# Patient Record
Sex: MALE | Race: BLACK OR AFRICAN AMERICAN | NOT HISPANIC OR LATINO | URBAN - METROPOLITAN AREA
[De-identification: names, ages, dates, MRNs, and addresses within clinical notes are randomized per-mention and may not be internally consistent; named-entity substitution may affect disease eponyms.]

---

## 2018-04-03 ENCOUNTER — INPATIENT (INPATIENT)
Facility: HOSPITAL | Age: 68
LOS: 9 days | Discharge: NURSING, REHAB OR LONG-TERM CARE | DRG: 690 | End: 2018-04-13
Attending: HOSPITALIST | Admitting: HOSPITALIST
Payer: MEDICARE

## 2018-04-03 VITALS
WEIGHT: 240.08 LBS | RESPIRATION RATE: 18 BRPM | HEIGHT: 71 IN | DIASTOLIC BLOOD PRESSURE: 79 MMHG | SYSTOLIC BLOOD PRESSURE: 157 MMHG | HEART RATE: 62 BPM | TEMPERATURE: 98 F | OXYGEN SATURATION: 96 %

## 2018-04-03 DIAGNOSIS — N18.4 CHRONIC KIDNEY DISEASE, STAGE 4 (SEVERE): ICD-10-CM

## 2018-04-03 DIAGNOSIS — N18.6 END STAGE RENAL DISEASE: ICD-10-CM

## 2018-04-03 DIAGNOSIS — N19 UNSPECIFIED KIDNEY FAILURE: ICD-10-CM

## 2018-04-03 DIAGNOSIS — N13.9 OBSTRUCTIVE AND REFLUX UROPATHY, UNSPECIFIED: ICD-10-CM

## 2018-04-03 LAB
ABO RH CONFIRMATION: SIGNIFICANT CHANGE UP
ALBUMIN SERPL ELPH-MCNC: 4.2 G/DL — SIGNIFICANT CHANGE UP (ref 3.3–5.2)
ALP SERPL-CCNC: 117 U/L — SIGNIFICANT CHANGE UP (ref 40–120)
ALT FLD-CCNC: 8 U/L — SIGNIFICANT CHANGE UP
ANION GAP SERPL CALC-SCNC: 16 MMOL/L — SIGNIFICANT CHANGE UP (ref 5–17)
ANISOCYTOSIS BLD QL: SLIGHT — SIGNIFICANT CHANGE UP
APPEARANCE UR: CLEAR — SIGNIFICANT CHANGE UP
APTT BLD: 27.6 SEC — SIGNIFICANT CHANGE UP (ref 27.5–37.4)
AST SERPL-CCNC: 25 U/L — SIGNIFICANT CHANGE UP
BACTERIA # UR AUTO: ABNORMAL
BASOPHILS # BLD AUTO: 0 K/UL — SIGNIFICANT CHANGE UP (ref 0–0.2)
BASOPHILS NFR BLD AUTO: 0.3 % — SIGNIFICANT CHANGE UP (ref 0–2)
BILIRUB SERPL-MCNC: 0.5 MG/DL — SIGNIFICANT CHANGE UP (ref 0.4–2)
BILIRUB UR-MCNC: NEGATIVE — SIGNIFICANT CHANGE UP
BLD GP AB SCN SERPL QL: SIGNIFICANT CHANGE UP
BUN SERPL-MCNC: 47 MG/DL — HIGH (ref 8–20)
CALCIUM SERPL-MCNC: 8.9 MG/DL — SIGNIFICANT CHANGE UP (ref 8.6–10.2)
CHLORIDE SERPL-SCNC: 102 MMOL/L — SIGNIFICANT CHANGE UP (ref 98–107)
CHLORIDE UR-SCNC: 31 MMOL/L — SIGNIFICANT CHANGE UP
CO2 SERPL-SCNC: 18 MMOL/L — LOW (ref 22–29)
COLOR SPEC: YELLOW — SIGNIFICANT CHANGE UP
CREAT ?TM UR-MCNC: 64 MG/DL — SIGNIFICANT CHANGE UP
CREAT SERPL-MCNC: 2.55 MG/DL — HIGH (ref 0.5–1.3)
DACRYOCYTES BLD QL SMEAR: SLIGHT — SIGNIFICANT CHANGE UP
DIFF PNL FLD: ABNORMAL
ELLIPTOCYTES BLD QL SMEAR: SLIGHT — SIGNIFICANT CHANGE UP
EOSINOPHIL # BLD AUTO: 0 K/UL — SIGNIFICANT CHANGE UP (ref 0–0.5)
EOSINOPHIL NFR BLD AUTO: 0.6 % — SIGNIFICANT CHANGE UP (ref 0–5)
EPI CELLS # UR: NEGATIVE — SIGNIFICANT CHANGE UP
FERRITIN SERPL-MCNC: 17 NG/ML — LOW (ref 30–400)
FOLATE SERPL-MCNC: 6.6 NG/ML — SIGNIFICANT CHANGE UP (ref 4–16)
GLUCOSE BLDC GLUCOMTR-MCNC: 126 MG/DL — HIGH (ref 70–99)
GLUCOSE BLDC GLUCOMTR-MCNC: 186 MG/DL — HIGH (ref 70–99)
GLUCOSE BLDC GLUCOMTR-MCNC: 200 MG/DL — HIGH (ref 70–99)
GLUCOSE BLDC GLUCOMTR-MCNC: 236 MG/DL — HIGH (ref 70–99)
GLUCOSE SERPL-MCNC: 152 MG/DL — HIGH (ref 70–115)
GLUCOSE UR QL: NEGATIVE MG/DL — SIGNIFICANT CHANGE UP
HCT VFR BLD CALC: 21.7 % — LOW (ref 42–52)
HCT VFR BLD CALC: 23.4 % — LOW (ref 42–52)
HCT VFR BLD CALC: 25.5 % — LOW (ref 42–52)
HGB BLD-MCNC: 6.5 G/DL — CRITICAL LOW (ref 14–18)
HGB BLD-MCNC: 7.1 G/DL — LOW (ref 14–18)
HGB BLD-MCNC: 7.7 G/DL — LOW (ref 14–18)
HYPOCHROMIA BLD QL: SLIGHT — SIGNIFICANT CHANGE UP
INR BLD: 1.22 RATIO — HIGH (ref 0.88–1.16)
IRON SATN MFR SERPL: 23 UG/DL — LOW (ref 59–158)
IRON SATN MFR SERPL: 5 % — LOW (ref 16–55)
KETONES UR-MCNC: NEGATIVE — SIGNIFICANT CHANGE UP
LEUKOCYTE ESTERASE UR-ACNC: ABNORMAL
LIDOCAIN IGE QN: 39 U/L — SIGNIFICANT CHANGE UP (ref 22–51)
LYMPHOCYTES # BLD AUTO: 1.1 K/UL — SIGNIFICANT CHANGE UP (ref 1–4.8)
LYMPHOCYTES # BLD AUTO: 13 % — LOW (ref 20–55)
MACROCYTES BLD QL: SLIGHT — SIGNIFICANT CHANGE UP
MCHC RBC-ENTMCNC: 19.4 PG — LOW (ref 27–31)
MCHC RBC-ENTMCNC: 19.6 PG — LOW (ref 27–31)
MCHC RBC-ENTMCNC: 19.9 PG — LOW (ref 27–31)
MCHC RBC-ENTMCNC: 30 G/DL — LOW (ref 32–36)
MCHC RBC-ENTMCNC: 30.2 G/DL — LOW (ref 32–36)
MCHC RBC-ENTMCNC: 30.3 G/DL — LOW (ref 32–36)
MCV RBC AUTO: 64.8 FL — LOW (ref 80–94)
MCV RBC AUTO: 64.9 FL — LOW (ref 80–94)
MCV RBC AUTO: 65.7 FL — LOW (ref 80–94)
MICROCYTES BLD QL: SLIGHT — SIGNIFICANT CHANGE UP
MONOCYTES # BLD AUTO: 0.9 K/UL — HIGH (ref 0–0.8)
MONOCYTES NFR BLD AUTO: 10.1 % — HIGH (ref 3–10)
NEUTROPHILS # BLD AUTO: 6.5 K/UL — SIGNIFICANT CHANGE UP (ref 1.8–8)
NEUTROPHILS NFR BLD AUTO: 75.8 % — HIGH (ref 37–73)
NITRITE UR-MCNC: NEGATIVE — SIGNIFICANT CHANGE UP
OB PNL STL: NEGATIVE — SIGNIFICANT CHANGE UP
OSMOLALITY UR: 297 MOSM/KG — LOW (ref 300–1000)
OVALOCYTES BLD QL SMEAR: SLIGHT — SIGNIFICANT CHANGE UP
PH UR: 6 — SIGNIFICANT CHANGE UP (ref 5–8)
PLAT MORPH BLD: NORMAL — SIGNIFICANT CHANGE UP
PLATELET # BLD AUTO: 303 K/UL — SIGNIFICANT CHANGE UP (ref 150–400)
PLATELET # BLD AUTO: 307 K/UL — SIGNIFICANT CHANGE UP (ref 150–400)
PLATELET # BLD AUTO: 315 K/UL — SIGNIFICANT CHANGE UP (ref 150–400)
POIKILOCYTOSIS BLD QL AUTO: SLIGHT — SIGNIFICANT CHANGE UP
POTASSIUM SERPL-MCNC: 4.7 MMOL/L — SIGNIFICANT CHANGE UP (ref 3.5–5.3)
POTASSIUM SERPL-SCNC: 4.7 MMOL/L — SIGNIFICANT CHANGE UP (ref 3.5–5.3)
PROT ?TM UR-MCNC: 51 MG/DL — HIGH (ref 0–12)
PROT SERPL-MCNC: 8.6 G/DL — SIGNIFICANT CHANGE UP (ref 6.6–8.7)
PROT UR-MCNC: 30 MG/DL
PROT/CREAT UR-RTO: 0.8 RATIO — HIGH
PROTHROM AB SERPL-ACNC: 13.5 SEC — HIGH (ref 9.8–12.7)
RBC # BLD: 3.35 M/UL — LOW (ref 4.6–6.2)
RBC # BLD: 3.56 M/UL — LOW (ref 4.6–6.2)
RBC # BLD: 3.93 M/UL — LOW (ref 4.6–6.2)
RBC # FLD: 18.3 % — HIGH (ref 11–15.6)
RBC # FLD: 18.3 % — HIGH (ref 11–15.6)
RBC # FLD: 18.4 % — HIGH (ref 11–15.6)
RBC BLD AUTO: ABNORMAL
RBC CASTS # UR COMP ASSIST: SIGNIFICANT CHANGE UP /HPF (ref 0–4)
SODIUM SERPL-SCNC: 136 MMOL/L — SIGNIFICANT CHANGE UP (ref 135–145)
SODIUM UR-SCNC: 45 MMOL/L — SIGNIFICANT CHANGE UP
SP GR SPEC: 1.01 — SIGNIFICANT CHANGE UP (ref 1.01–1.02)
TIBC SERPL-MCNC: 466 UG/DL — HIGH (ref 220–430)
TRANSFERRIN SERPL-MCNC: 326 MG/DL — SIGNIFICANT CHANGE UP (ref 180–329)
TYPE + AB SCN PNL BLD: SIGNIFICANT CHANGE UP
UROBILINOGEN FLD QL: NEGATIVE MG/DL — SIGNIFICANT CHANGE UP
VIT B12 SERPL-MCNC: 939 PG/ML — SIGNIFICANT CHANGE UP (ref 232–1245)
WBC # BLD: 7.5 K/UL — SIGNIFICANT CHANGE UP (ref 4.8–10.8)
WBC # BLD: 8.5 K/UL — SIGNIFICANT CHANGE UP (ref 4.8–10.8)
WBC # BLD: 8.6 K/UL — SIGNIFICANT CHANGE UP (ref 4.8–10.8)
WBC # FLD AUTO: 7.5 K/UL — SIGNIFICANT CHANGE UP (ref 4.8–10.8)
WBC # FLD AUTO: 8.5 K/UL — SIGNIFICANT CHANGE UP (ref 4.8–10.8)
WBC # FLD AUTO: 8.6 K/UL — SIGNIFICANT CHANGE UP (ref 4.8–10.8)
WBC UR QL: >50

## 2018-04-03 PROCEDURE — 99285 EMERGENCY DEPT VISIT HI MDM: CPT

## 2018-04-03 PROCEDURE — 93010 ELECTROCARDIOGRAM REPORT: CPT

## 2018-04-03 PROCEDURE — 76775 US EXAM ABDO BACK WALL LIM: CPT | Mod: 26

## 2018-04-03 PROCEDURE — 99223 1ST HOSP IP/OBS HIGH 75: CPT

## 2018-04-03 PROCEDURE — 71045 X-RAY EXAM CHEST 1 VIEW: CPT | Mod: 26

## 2018-04-03 PROCEDURE — 74176 CT ABD & PELVIS W/O CONTRAST: CPT | Mod: 26

## 2018-04-03 RX ORDER — CEFTRIAXONE 500 MG/1
1000 INJECTION, POWDER, FOR SOLUTION INTRAMUSCULAR; INTRAVENOUS ONCE
Qty: 0 | Refills: 0 | Status: COMPLETED | OUTPATIENT
Start: 2018-04-03 | End: 2018-04-03

## 2018-04-03 RX ORDER — SODIUM CHLORIDE 9 MG/ML
1000 INJECTION, SOLUTION INTRAVENOUS
Qty: 0 | Refills: 0 | Status: DISCONTINUED | OUTPATIENT
Start: 2018-04-03 | End: 2018-04-13

## 2018-04-03 RX ORDER — DEXTROSE 50 % IN WATER 50 %
25 SYRINGE (ML) INTRAVENOUS ONCE
Qty: 0 | Refills: 0 | Status: DISCONTINUED | OUTPATIENT
Start: 2018-04-03 | End: 2018-04-13

## 2018-04-03 RX ORDER — INSULIN LISPRO 100/ML
VIAL (ML) SUBCUTANEOUS
Qty: 0 | Refills: 0 | Status: DISCONTINUED | OUTPATIENT
Start: 2018-04-03 | End: 2018-04-13

## 2018-04-03 RX ORDER — ACETAMINOPHEN 500 MG
650 TABLET ORAL EVERY 6 HOURS
Qty: 0 | Refills: 0 | Status: DISCONTINUED | OUTPATIENT
Start: 2018-04-03 | End: 2018-04-13

## 2018-04-03 RX ORDER — CEFTRIAXONE 500 MG/1
1 INJECTION, POWDER, FOR SOLUTION INTRAMUSCULAR; INTRAVENOUS ONCE
Qty: 0 | Refills: 0 | Status: DISCONTINUED | OUTPATIENT
Start: 2018-04-03 | End: 2018-04-03

## 2018-04-03 RX ORDER — DEXTROSE 50 % IN WATER 50 %
1 SYRINGE (ML) INTRAVENOUS ONCE
Qty: 0 | Refills: 0 | Status: DISCONTINUED | OUTPATIENT
Start: 2018-04-03 | End: 2018-04-13

## 2018-04-03 RX ORDER — DEXTROSE 50 % IN WATER 50 %
12.5 SYRINGE (ML) INTRAVENOUS ONCE
Qty: 0 | Refills: 0 | Status: DISCONTINUED | OUTPATIENT
Start: 2018-04-03 | End: 2018-04-13

## 2018-04-03 RX ORDER — SODIUM CHLORIDE 9 MG/ML
1000 INJECTION, SOLUTION INTRAVENOUS ONCE
Qty: 0 | Refills: 0 | Status: COMPLETED | OUTPATIENT
Start: 2018-04-03 | End: 2018-04-03

## 2018-04-03 RX ORDER — SACCHAROMYCES BOULARDII 250 MG
250 POWDER IN PACKET (EA) ORAL
Qty: 0 | Refills: 0 | Status: DISCONTINUED | OUTPATIENT
Start: 2018-04-03 | End: 2018-04-05

## 2018-04-03 RX ORDER — CEFTRIAXONE 500 MG/1
1 INJECTION, POWDER, FOR SOLUTION INTRAMUSCULAR; INTRAVENOUS EVERY 24 HOURS
Qty: 0 | Refills: 0 | Status: DISCONTINUED | OUTPATIENT
Start: 2018-04-04 | End: 2018-04-05

## 2018-04-03 RX ORDER — OXYCODONE HYDROCHLORIDE 5 MG/1
5 TABLET ORAL ONCE
Qty: 0 | Refills: 0 | Status: DISCONTINUED | OUTPATIENT
Start: 2018-04-03 | End: 2018-04-03

## 2018-04-03 RX ORDER — LIDOCAINE HCL 20 MG/ML
20 VIAL (ML) INJECTION ONCE
Qty: 0 | Refills: 0 | Status: COMPLETED | OUTPATIENT
Start: 2018-04-03 | End: 2018-04-03

## 2018-04-03 RX ORDER — TAMSULOSIN HYDROCHLORIDE 0.4 MG/1
0.4 CAPSULE ORAL AT BEDTIME
Qty: 0 | Refills: 0 | Status: DISCONTINUED | OUTPATIENT
Start: 2018-04-03 | End: 2018-04-04

## 2018-04-03 RX ORDER — IRON SUCROSE 20 MG/ML
200 INJECTION, SOLUTION INTRAVENOUS EVERY 24 HOURS
Qty: 0 | Refills: 0 | Status: COMPLETED | OUTPATIENT
Start: 2018-04-03 | End: 2018-04-07

## 2018-04-03 RX ORDER — GLUCAGON INJECTION, SOLUTION 0.5 MG/.1ML
1 INJECTION, SOLUTION SUBCUTANEOUS ONCE
Qty: 0 | Refills: 0 | Status: DISCONTINUED | OUTPATIENT
Start: 2018-04-03 | End: 2018-04-13

## 2018-04-03 RX ORDER — SODIUM CHLORIDE 9 MG/ML
1000 INJECTION INTRAMUSCULAR; INTRAVENOUS; SUBCUTANEOUS
Qty: 0 | Refills: 0 | Status: DISCONTINUED | OUTPATIENT
Start: 2018-04-03 | End: 2018-04-05

## 2018-04-03 RX ORDER — ONDANSETRON 8 MG/1
4 TABLET, FILM COATED ORAL EVERY 8 HOURS
Qty: 0 | Refills: 0 | Status: COMPLETED | OUTPATIENT
Start: 2018-04-03 | End: 2018-04-06

## 2018-04-03 RX ADMIN — CEFTRIAXONE 1000 MILLIGRAM(S): 500 INJECTION, POWDER, FOR SOLUTION INTRAMUSCULAR; INTRAVENOUS at 06:19

## 2018-04-03 RX ADMIN — IRON SUCROSE 110 MILLIGRAM(S): 20 INJECTION, SOLUTION INTRAVENOUS at 14:47

## 2018-04-03 RX ADMIN — SODIUM CHLORIDE 100 MILLILITER(S): 9 INJECTION INTRAMUSCULAR; INTRAVENOUS; SUBCUTANEOUS at 21:24

## 2018-04-03 RX ADMIN — Medication 1: at 17:19

## 2018-04-03 RX ADMIN — OXYCODONE HYDROCHLORIDE 5 MILLIGRAM(S): 5 TABLET ORAL at 21:55

## 2018-04-03 RX ADMIN — SODIUM CHLORIDE 100 MILLILITER(S): 9 INJECTION INTRAMUSCULAR; INTRAVENOUS; SUBCUTANEOUS at 13:08

## 2018-04-03 RX ADMIN — SODIUM CHLORIDE 1000 MILLILITER(S): 9 INJECTION, SOLUTION INTRAVENOUS at 06:19

## 2018-04-03 RX ADMIN — Medication: at 15:41

## 2018-04-03 RX ADMIN — ONDANSETRON 4 MILLIGRAM(S): 8 TABLET, FILM COATED ORAL at 13:08

## 2018-04-03 RX ADMIN — Medication 650 MILLIGRAM(S): at 17:20

## 2018-04-03 RX ADMIN — ONDANSETRON 4 MILLIGRAM(S): 8 TABLET, FILM COATED ORAL at 21:24

## 2018-04-03 RX ADMIN — OXYCODONE HYDROCHLORIDE 5 MILLIGRAM(S): 5 TABLET ORAL at 21:24

## 2018-04-03 RX ADMIN — TAMSULOSIN HYDROCHLORIDE 0.4 MILLIGRAM(S): 0.4 CAPSULE ORAL at 21:24

## 2018-04-03 RX ADMIN — Medication 250 MILLIGRAM(S): at 21:24

## 2018-04-03 NOTE — ED PROVIDER NOTE - CARE PLAN
Principal Discharge DX:	Renal failure  Secondary Diagnosis:	Urinary obstruction  Secondary Diagnosis:	Anemia

## 2018-04-03 NOTE — ED ADULT NURSE REASSESSMENT NOTE - NS ED NURSE REASSESS COMMENT FT1
pt ex wife arrived at the Emergency Department at this time. as per ex-wife pt has been weak this past week and vomiting. pt denies any symptoms at this time. pt is awaiting for a doctor evaluation.
pt is resting at this time, VSS md ordered a bourne catheter to be placed, pt refused regardless of medical need.  Md spring aware, pt is to be admitted. will continue to monitor
MD alejandro Patel at bedside assessing patient, s/p  bounre insertion.  MD made aware of hematuria, Pts refusal of blood transfusion.  Md believe this is chronic anemia, vitals WNL, and receiving Venofer.  Pt to be placed on tele  once he arrives to his  admitting floor.
Pt axox3 brought to [address] [place] [address]. Bedside report given to RN sofi . Pt vitals WNL. Pt placed tele monitor by receiving RN, fall precautions in place, staff made aware of needs.

## 2018-04-03 NOTE — CONSULT NOTE ADULT - SUBJECTIVE AND OBJECTIVE BOX
UROLOGY CONSULT NOTE    HPI:  History limited as Pt is poor historian, obtained form patient son at bedside. Briefly he is 68 y/o male with PMHx of DM-II, legally blind, reported no other PMHx and not on any medicine at home though has not seen any PMD in years, presented to ED with vomiting, X 1 episode. Per son patient had NBNB vomiting episode at home yesterday and hence he was brought into ED. Pt is AOX3, he denied any active complaints, no fever, chills, nausea, headache, dizziness, chest pain, SOB, palpitation, bowel .bladder habits are normal, able to urinate. (2018 09:40)      Pt seen and examined at the bedside.  He is not complaining of any abdominal pain at this time but is complaining of some pain at the base of the penis to the tip of the penis.  Pt states he does not follow with a PCP and has never had his prostate checked.  He admits to frequent urination at night, difficulty starting to urinate, feeling of needing to urinate again shortly after voiding.  Pt has bourne already inserted draining bright red urine with visible clots.      PAST MEDICAL & SURGICAL HISTORY:  Diabetes  No significant past surgical history      REVIEW OF SYSTEMS  Negative as per HPI	    MEDICATIONS  (STANDING):  dextrose 5%. 1000 milliLiter(s) (50 mL/Hr) IV Continuous <Continuous>  dextrose 50% Injectable 12.5 Gram(s) IV Push once  dextrose 50% Injectable 25 Gram(s) IV Push once  dextrose 50% Injectable 25 Gram(s) IV Push once  insulin lispro (HumaLOG) corrective regimen sliding scale   SubCutaneous three times a day before meals  iron sucrose IVPB 200 milliGRAM(s) IV Intermittent every 24 hours  ondansetron Injectable 4 milliGRAM(s) IV Push every 8 hours  oxyCODONE    IR 5 milliGRAM(s) Oral once  saccharomyces boulardii 250 milliGRAM(s) Oral two times a day  sodium chloride 0.9%. 1000 milliLiter(s) (100 mL/Hr) IV Continuous <Continuous>  tamsulosin 0.4 milliGRAM(s) Oral at bedtime    MEDICATIONS  (PRN):  acetaminophen   Tablet 650 milliGRAM(s) Oral every 6 hours PRN For Temp greater than 38 C (100.4 F)  acetaminophen   Tablet. 650 milliGRAM(s) Oral every 6 hours PRN Mild Pain (1 - 3)  dextrose Gel 1 Dose(s) Oral once PRN Blood Glucose LESS THAN 70 milliGRAM(s)/deciliter  glucagon  Injectable 1 milliGRAM(s) IntraMuscular once PRN Glucose LESS THAN 70 milligrams/deciliter      Allergies    amoxicillin (Rash)    Intolerances        SOCIAL HISTORY:    FAMILY HISTORY:      Vital Signs Last 24 Hrs  T(C): 37.1 (2018 16:17), Max: 37.1 (2018 16:17)  T(F): 98.7 (2018 16:17), Max: 98.7 (2018 16:17)  HR: 85 (2018 16:17) (62 - 87)  BP: 143/77 (2018 16:17) (137/66 - 166/84)  BP(mean): --  RR: 18 (2018 16:17) (18 - 20)  SpO2: 92% (2018 16:17) (92% - 100%)    PHYSICAL EXAM:    Constitutional: NAD, resting comfortably, WNWD  Head: NCAT  Neck: trachea midline, FROM  Respiratory: CTA b/l, no WRR  Cardiovascular: S1S2, RRR  Gastrointestinal: abd. soft, NT/ND, +BSx4   Genitourinary: +bourne c bright red urine draining, visible clots in bourne tubing, prostate mildly tender, bourne balloon feels to be inflated in the base of the prostate, no visible blood in the rectum  Extremities: no LE edema b/l  Neurological: AOx3, sensation grossly intact  Skin: warm, dry, intact       LABS:                        7.1    8.5   )-----------( 307      ( 2018 20:45 )             23.4     04-    136  |  102  |  47.0<H>  ----------------------------<  152<H>  4.7   |  18.0<L>  |  2.55<H>    Ca    8.9      2018 04:44    TPro  8.6  /  Alb  4.2  /  TBili  0.5  /  DBili  x   /  AST  25  /  ALT  8   /  AlkPhos  117  04-03    PT/INR - ( 2018 11:56 )   PT: 13.5 sec;   INR: 1.22 ratio         PTT - ( 2018 11:56 )  PTT:27.6 sec  Urinalysis Basic - ( 2018 04:22 )    Color: Yellow / Appearance: Clear / S.010 / pH: x  Gluc: x / Ketone: Negative  / Bili: Negative / Urobili: Negative mg/dL   Blood: x / Protein: 30 mg/dL / Nitrite: Negative   Leuk Esterase: Moderate / RBC: 0-2 /HPF / WBC >50   Sq Epi: x / Non Sq Epi: Negative / Bacteria: Occasional        RADIOLOGY & ADDITIONAL STUDIES:  EXAM: CT ABDOMEN AND PELVIS     PROCEDURE DATE: 2018     INTERPRETATION: HISTORY: Hydronephrosis and acute renal failure..     DATE and TIME of EXAM: 4/3/2018 8:43 AM     TECHNIQUE: Sections were obtained from the diaphragm to the pubic symphysis   without oral or IV contrast.     COMPARISON EXAMINATION: No prior exam.     FINDINGS:   The lung bases are clear and there is no evidence of a pleural effusion. The   liver is unremarkable. Status post cholecystectomy.     The spleen is not enlarged and demonstrates no focal abnormality. The   pancreatic contour is unremarkable without evidence of mass, inflammation or   ductal dilatation.     The adrenal glands demonstrate normal size and contour.     The left kidney demonstrates a small hyperdense cyst. A simple cyst arises   from the lower pole as well. There is bilateral hydronephrosis and   hydroureter area due to both ureters are dilated to the level of the   ureterovesical junction. The prostate gland is enlarged. The bladder is   distended. There is diffuse thickening of the bladder wall. The bladder   extends into the lower abdomen. Trabeculation of the bladder wall is noted   consistent with chronic outlet obstruction.     No evidence of retroperitoneal or pelvic lymphadenopathy.     No colonic abnormality. The appendix is visualized and is normal.     Degenerative changes in the spine.     IMPRESSION:     Bilateral hydronephrosis and hydroureter to the level of the urinary   bladder. Enlargement of the prostate gland is noted with distention of the   urinary bladder, thickening of the bladder wall, and trabeculation of the   bladder wall. Findings consistent with chronic outlet obstruction..

## 2018-04-03 NOTE — ED PROVIDER NOTE - ATTENDING CONTRIBUTION TO CARE
66 yo M with hx of DM c/o vomiting.  No fever or diarrhea.  Pt denies any c/o, but family concerned because abd is distneded.  On exam afebrile in NAD, HEENT mm moist, Neck supple, Cor Reg, Lungs clear, Abd soft, mildly distended, no localized tenderness, Ext no edema.  Will check labs and re-eval 68 yo M with hx of DM c/o vomiting.  No fever or diarrhea.  Pt denies any c/o, but family concerned because abd is distended.  On exam afebrile in NAD, HEENT mm moist, Neck supple, Cor Reg, Lungs clear, Abd soft, mildly distended, no localized tenderness, Ext no edema.  Will check labs and re-eval

## 2018-04-03 NOTE — ED ADULT NURSE NOTE - OBJECTIVE STATEMENT
pt care assumed at 0100, no apparent distress noted at this time. pt received Alert and Oriented to person, place, situation and time laying in bed comfortably. pt denies any medical complaints, denies pain. Pt states he had one episode of vomiting and states he feels fine now. HR is regular, lung sounds are clear b/l, abd is soft and nontender with positive bowel sounds in all four quadrants, skin is warm, dry and appropriate for age and race. plan of care explained, will continue to monitor.

## 2018-04-03 NOTE — H&P ADULT - NSHPLABSRESULTS_GEN_ALL_CORE
LABS:                        7.7    8.6   )-----------( 315      ( 2018 04:44 )             25.5     04-03    136  |  102  |  47.0<H>  ----------------------------<  152<H>  4.7   |  18.0<L>  |  2.55<H>    Ca    8.9      2018 04:44    TPro  8.6  /  Alb  4.2  /  TBili  0.5  /  DBili  x   /  AST  25  /  ALT  8   /  AlkPhos  117  04-03        LIVER FUNCTIONS - ( 2018 04:44 )  Alb: 4.2 g/dL / Pro: 8.6 g/dL / ALK PHOS: 117 U/L / ALT: 8 U/L / AST: 25 U/L / GGT: x           Urinalysis Basic - ( 2018 04:22 )    Color: Yellow / Appearance: Clear / S.010 / pH: x  Gluc: x / Ketone: Negative  / Bili: Negative / Urobili: Negative mg/dL   Blood: x / Protein: 30 mg/dL / Nitrite: Negative   Leuk Esterase: Moderate / RBC: 0-2 /HPF / WBC >50   Sq Epi: x / Non Sq Epi: Negative / Bacteria: Occasional

## 2018-04-03 NOTE — ED PROVIDER NOTE - PROGRESS NOTE DETAILS
Spoke with ex-wife and son who explains that pt will not verbalize illness. Family reports multiple episode of vomiting and weakness. Non compliant with medications. Has an extensive use of etoh abuse. Unsure if he is urinating, wearing diaper. Will now obtain Labs, urine and reassess. signed out from Dr Carreon awaiting renal sono result sono shows bilateral hydro discussed with renal Dr Lang and he rec admt to hospitaist and he will see for renal w/u and rec place bourne ordered Dr Santana Hospitalist called for admission

## 2018-04-03 NOTE — CONSULT NOTE ADULT - ASSESSMENT
1.DMN,    2.Profound Fe - Deficiency anemia    3.HARLEEN - Bilateral Obstructive uropathy ( Chronic )    4.CKD - Stage ?      S ; IV Fe , Blood TX , Warren , W.U, DM Control,     Evaluation,    D/W The wife @ Bedside,

## 2018-04-03 NOTE — CONSULT NOTE ADULT - ASSESSMENT
67M c b/l hydronephrosis, hydroureter and thickened urinary bladder wall c hematuria in bourne, likely due to rapid decompression of the bladder or trama to the prostatic urethra with bourne balloon  - must monitor for post obstructive diuresis and recommend the keep pt normotensive and to match pts IVF c UOP.  - new bourne to be reinserted by myself  - trend BUN/Cr as per nephrology

## 2018-04-03 NOTE — H&P ADULT - ASSESSMENT
History limited as Pt is poor historian, obtained form patient son at bedside. Briefly he is 66 y/o male with PMHx of DM-II, legally blind, reported no other PMHx and not on any medicine at home though has not seen any PMD in years, presented to ED with NBNB vomiting X 1 episode, no other complaints. In ED found to have renal failure acute vs chronic, UTI and admitted to medicine for further management. Nephrology consulted from ED.    Renal Failure unclear acute vs chronic:  - Bun/Cr 47/2.55. Renal function normal in 2015. ? Obstructive uropathy for prostate.  - Obtain urine studies, get CT abdomen.   - IVF trial, Warren placed in ED, will c/w it.  - Nephrology full consult to follow.    UTI with b/l hydronephrosis:  - UA noted with bacteria, LE, WBC.  - S/p Rocephin dose in ED, will c/w Rocephin and probiotics.  - Obtain CT abdomen.    Microcytic anemia likely chronic:  - Hgb 7.7, 10 in 2015.  - Obtain anemia work up, type and screen, coags, occult blood.  - Monitor hgb and transfuse as needed.    H/o DM-II- Not on any meds at home, keep on LSS and FS monitoring. Check A1C, lipid panel.  ? H/o HTN- BP elevated in ED, monitor and start antihypertensive if persistently elevated.  DVT ppx- SCD  Blindness- Keep on fall precaution, enhanced supervision. History limited as Pt is poor historian, obtained form patient son at bedside. Briefly he is 68 y/o male with PMHx of DM-II, legally blind, reported no other PMHx and not on any medicine at home though has not seen any PMD in years, presented to ED with NBNB vomiting X 1 episode, no other complaints. In ED found to have renal failure acute vs chronic, UTI and admitted to medicine for further management. Nephrology consulted from ED.    Renal Failure unclear acute vs chronic:  - Bun/Cr 47/2.55. Renal function normal in 2015. ? Obstructive uropathy for prostate.  - Obtain urine studies, get CT abdomen.   - IVF trial, Bourne placed in ED, will c/w it.  - Nephrology full consult to follow.    UTI with b/l hydronephrosis:  - UA noted with bacteria, LE, WBC.  - S/p Rocephin dose in ED, will c/w Rocephin and probiotics.  - Obtain CT abdomen.    Microcytic anemia likely chronic:  - Hgb 7.7, 10 in 2015.  - Obtain anemia work up, type and screen, coags, occult blood.  - Monitor hgb and transfuse as needed.    H/o DM-II- Not on any meds at home, keep on LSS and FS monitoring. Check A1C, lipid panel.  ? H/o HTN- BP elevated in ED, monitor and start antihypertensive if persistently elevated.  DVT ppx- SCD  Blindness- Keep on fall precaution, enhanced supervision.    Addendum: CBC shows drip in hgb 6.5, bourne with hematuria, CT abdomen shows b/l hydronephrosis /hydroureter with enlarged prostate and chronic outlet obstruction. Pt refusing blood transfusion, wife and son at bedside and appreciate pt wishes. I explained the risk and consequences of dropping hgb including death and her verbalize to understand. Urology consulted to obstruction and hematuria. (Spoke with general surgery PA) History limited as Pt is poor historian, obtained form patient son at bedside. Briefly he is 66 y/o male with PMHx of DM-II, legally blind, reported no other PMHx and not on any medicine at home though has not seen any PMD in years, presented to ED with NBNB vomiting X 1 episode, no other complaints. In ED found to have renal failure acute vs chronic, UTI and admitted to medicine for further management. Nephrology consulted from ED.    Renal Failure unclear acute vs chronic:  - Bun/Cr 47/2.55. Renal function normal in 2015. ? Obstructive uropathy from prostate.  - Obtain urine studies, get CT abdomen.   - IVF trial, Bourne placed in ED, will c/w it.  - Nephrology full consult to follow.    UTI with b/l hydronephrosis:  - UA noted with bacteria, LE, WBC.  - S/p Rocephin dose in ED, will c/w Rocephin and probiotics.  - Obtain CT abdomen.    Microcytic anemia likely chronic:  - Hgb 7.7, 10 in 2015.  - Obtain anemia work up, type and screen, coags, occult blood.  - Monitor hgb and transfuse as needed.    H/o DM-II- Not on any meds at home, keep on LSS and FS monitoring. Check A1C, lipid panel.  ? H/o HTN- BP elevated in ED, monitor and start antihypertensive if persistently elevated.  DVT ppx- SCD  Blindness- Keep on fall precaution, enhanced supervision.    Addendum: CBC shows drip in hgb 6.5, bourne with hematuria, CT abdomen shows b/l hydronephrosis /hydroureter with enlarged prostate and chronic outlet obstruction. Pt refusing blood transfusion, wife and son at bedside and appreciate pt wishes. I explained the risk and consequences of dropping hgb including death and he verbalize to understand and stil refusing transfusion. Started on IV Venofer.  Urology consulted for obstruction and hematuria. (Spoke with general surgery PA)

## 2018-04-03 NOTE — H&P ADULT - HISTORY OF PRESENT ILLNESS
History limited as Pt is poor historian, obtained form patient son at bedside. Briefly he is 66 y/o male with PMHx of DM-II, legally blind, reported no other PMHx and not on any medicine at home though has not seen any PMD in years, presented to ED with vomiting, X 1 episode. Per son patient had NBNB vomiting episode at home yesterday and hence he was brought into ED. Pt is AOX3, he denied any active complaints, no fever, chills, nausea, headache, dizziness, chest pain, SOB, palpitation, bowel .bladder habits are normal, able to urinate.

## 2018-04-03 NOTE — PROGRESS NOTE ADULT - SUBJECTIVE AND OBJECTIVE BOX
Nephrology Progress Note  Spoke with ER re: Todd Lopez  BL [place];  Needs UA, urine culture, urine sodium, urine chloride, urine osm, urine pro/cr ratio  Would have bourne catheter  Pt being admitted to medicine  Full consult to follow

## 2018-04-03 NOTE — CONSULT NOTE ADULT - SUBJECTIVE AND OBJECTIVE BOX
Patient is a 67y old  Male who presents with a chief complaint of Vomiting. (2018 09:40)      HPI:  History limited as Pt is a  poor historian, obtained form patient's  son at bedside.   Briefly he is 66 y/o AA  male with PMH of DM-II, legally blind, reported no other PMHx and not on any medicine at home though has not seen any PMD in years, presented to ED with vomiting, X 1 episode. Per son patient had NBNB vomiting episode at home yesterday and hence he was brought into ED.     Pt is AOX3, he denied any active complaints, no fever, chills, nausea, headache, dizziness, chest pain, SOB, palpitation, bowel .bladder habits are normal, able to urinate. (2018 09:40)      PAST MEDICAL & SURGICAL HISTORY:  Diabetes  No significant past surgical history    FAMILY HISTORY: No ESRD,    Social History: No ETOH,    MEDICATIONS  (STANDING):  dextrose 5%. 1000 milliLiter(s) (50 mL/Hr) IV Continuous <Continuous>  dextrose 50% Injectable 12.5 Gram(s) IV Push once  dextrose 50% Injectable 25 Gram(s) IV Push once  dextrose 50% Injectable 25 Gram(s) IV Push once  insulin lispro (HumaLOG) corrective regimen sliding scale   SubCutaneous three times a day before meals  iron sucrose IVPB 200 milliGRAM(s) IV Intermittent every 24 hours  ondansetron Injectable 4 milliGRAM(s) IV Push every 8 hours  saccharomyces boulardii 250 milliGRAM(s) Oral two times a day  sodium chloride 0.9%. 1000 milliLiter(s) (100 mL/Hr) IV Continuous <Continuous>  tamsulosin 0.4 milliGRAM(s) Oral at bedtime    MEDICATIONS  (PRN):  acetaminophen   Tablet 650 milliGRAM(s) Oral every 6 hours PRN For Temp greater than 38 C (100.4 F)  acetaminophen   Tablet. 650 milliGRAM(s) Oral every 6 hours PRN Mild Pain (1 - 3)  dextrose Gel 1 Dose(s) Oral once PRN Blood Glucose LESS THAN 70 milliGRAM(s)/deciliter  glucagon  Injectable 1 milliGRAM(s) IntraMuscular once PRN Glucose LESS THAN 70 milligrams/deciliter    Allergies    amoxicillin (Rash)    Intolerances    REVIEW OF SYSTEMS:    CONSTITUTIONAL: No fever, weight loss, + fatigue  EYES: Blind,  ENMT:  No difficulty hearing, tinnitus, vertigo; No sinus or throat pain  NECK: No pain or stiffness  RESPIRATORY: No cough, wheezing, chills or hemoptysis; No shortness of breath  CARDIOVASCULAR: No chest pain, palpitations, dizziness, or leg swelling  GASTROINTESTINAL: As Per HPI,  GENITOURINARY: No dysuria, frequency, hematuria, or incontinence  NEUROLOGICAL: No headaches, memory loss, loss of strength, numbness, or tremors  SKIN: No itching, burning, rashes, or lesions   LYMPH NODES: No enlarged glands  ENDOCRINE: No heat or cold intolerance; No hair loss  MUSCULOSKELETAL: No joint pain or swelling; No muscle, back, or extremity pain  PSYCHIATRIC: No depression, anxiety, mood swings, or difficulty sleeping  HEME/LYMPH: No easy bruising, or bleeding gums        Vital Signs Last 24 Hrs  T(C): 36.6 (2018 06:04), Max: 36.7 (2018 00:25)  T(F): 97.8 (2018 06:04), Max: 98 (2018 00:25)  HR: 84 (2018 06:04) (62 - 87)  BP: 139/77 (2018 06:04) (139/77 - 166/84)  BP(mean): --  RR: 20 (2018 06:04) (18 - 20)  SpO2: 100% (2018 06:04) (96% - 100%)    PHYSICAL EXAM:    GENERAL: NAD, well-groomed, well-developed , Pale,  HEAD:  Atraumatic, Normocephalic  EYES: EOMI,   ENMT: No tonsillar erythema, exudates, or enlargement; Moist mucous membranes,   NECK: Supple, No JVD, Normal thyroid  NERVOUS SYSTEM:  Alert & Oriented X3,   CHEST/LUNG: Clear to percussion bilaterally; No rales, rhonchi, wheezing, or rubs  HEART: Regular rate and rhythm; No murmurs, rubs, or gallops  ABDOMEN: Soft, Nontender, Nondistended; Bowel sounds present, Bladder markedly distended,  EXTREMITIES:  1+ Peripheral Pulses, No clubbing, cyanosis, or edema  LYMPH: No lymphadenopathy noted  SKIN: No rashes or lesions      LABS:                         6.5    7.5   )-----------( 303      ( 2018 11:56 )             21.7     04-03    136  |  102  |  47.0<H>  ----------------------------<  152<H>  4.7   |  18.0<L>  |  2.55<H>    Ca    8.9      2018 04:44    TPro  8.6  /  Alb  4.2  /  TBili  0.5  /  DBili  x   /  AST  25  /  ALT  8   /  AlkPhos  117      PT/INR - ( 2018 11:56 )   PT: 13.5 sec;   INR: 1.22 ratio         PTT - ( 2018 11:56 )  PTT:27.6 sec  Urinalysis Basic - ( 2018 04:22 )    Color: Yellow / Appearance: Clear / S.010 / pH: x  Gluc: x / Ketone: Negative  / Bili: Negative / Urobili: Negative mg/dL   Blood: x / Protein: 30 mg/dL / Nitrite: Negative   Leuk Esterase: Moderate / RBC: 0-2 /HPF / WBC >50   Sq Epi: x / Non Sq Epi: Negative / Bacteria: Occasional    RADIOLOGY & ADDITIONAL TESTS:    CAPILLARY BLOOD GLUCOSE    POCT Blood Glucose.: 126 mg/dL (2018 11:27)    CT Abdomen and Pelvis No Cont (18 @ 08:47)     TECHNIQUE:  Sections were obtained from the diaphragm to the pubic symphysis without oral or IV contrast.     COMPARISON EXAMINATION:   No prior exam.    FINDINGS:    The lung bases are clear and there is no evidence of a pleural effusion.   The liver is unremarkable. Status post cholecystectomy.    The spleen is not enlarged and demonstrates no focal abnormality. The   pancreatic contour is unremarkable without evidence of mass, inflammation   or ductal dilatation.    The adrenal glands demonstrate normal size and contour.    The left kidney demonstrates a small hyperdense cyst. A simple cyst   arises from the lower pole as well. There is bilateral hydronephrosis and   hydroureter area due to both ureters are dilated to the level of the   ureterovesical junction. The prostate gland is enlarged. The bladder is   distended. There is diffuse thickening of the bladder wall. The bladder   extends into the lower abdomen. Trabeculation of the bladder wall is   noted consistent with chronic outlet obstruction.    No evidence of retroperitoneal or pelvic lymphadenopathy.    No colonic abnormality. The appendix is visualized and is normal.    Degenerative changes in the spine.    IMPRESSION:    Bilateral hydronephrosis and hydroureter to the level of the urinary   bladder. Enlargement of the prostate gland is noted with distention of   the urinary bladder, thickening of the bladder wall, and trabeculation of   the bladder wall. Findings consistent with chronic outlet obstruction.    US Renal (18 @ 07:09)     EXAM:  US KIDNEY(S)                          PROCEDURE DATE:  2018      INTERPRETATION:  CLINICAL INFORMATION: Vomiting and weakness. History of   diabetes and hypertension with urinary incontinence.    COMPARISON: None available.    TECHNIQUE: Sonography of the kidneys and bladder.     FINDINGS:    Right kidney:  11.6 cm. There is mild to moderate right-sided   hydronephrosis. No shadowing intrarenal calcification is noted.    Left kidney:  13.3 cm. There is mild to moderate left-sided   hydronephrosis. No shadowing intrarenal calcification is noted.    Urinary bladder: There is dependent, nonvascular echogenicity within the   bladder.  The ureteral jets are not visualized.  The patient is not able to void for this exam.    IMPRESSION:     Bilateral hydronephrosis.    Dependent echogenicity within the bladder could reflect infection or   hemorrhage.

## 2018-04-03 NOTE — ED PROVIDER NOTE - OBJECTIVE STATEMENT
66 y/o M pt with a pmhx DM presents to the ED c/o 2 episodes of emesis that onset today. hung does not know why he is in the Ed. Reports "feeling hungry." Denies fever, diarrhea, sob, urinary complaints, chest pain or any other complaints. Allergic to Amoxicillin. Not taking routine medications at this time. 68 y/o M pt with a pmhx DM, HTN, high cholesterol presents to the ED c/o 2 episodes of emesis that onset today. Currently does not know why he is in the Ed. Reports "feeling hungry." Denies fever, diarrhea, sob, urinary complaints, chest pain or any other complaints. Allergic to Amoxicillin. Not taking routine medications at this time.

## 2018-04-03 NOTE — ED ADULT TRIAGE NOTE - CHIEF COMPLAINT QUOTE
patient biba from home denies any complaints of pain or discomfort, and doesn't know why he is even here, patient alert and oriented x4 EMS states that patient vomited x2 last night, patient states that he did and now feels better, denies any complaints of nausea or vomiting

## 2018-04-03 NOTE — H&P ADULT - NSHPPHYSICALEXAM_GEN_ALL_CORE
PHYSICAL EXAM:    Vital Signs Last 24 Hrs  T(C): 36.6 (03 Apr 2018 06:04), Max: 36.7 (03 Apr 2018 00:25)  T(F): 97.8 (03 Apr 2018 06:04), Max: 98 (03 Apr 2018 00:25)  HR: 84 (03 Apr 2018 06:04) (62 - 87)  BP: 139/77 (03 Apr 2018 06:04) (139/77 - 166/84)  BP(mean): --  RR: 20 (03 Apr 2018 06:04) (18 - 20)  SpO2: 100% (03 Apr 2018 06:04) (96% - 100%)    GENERAL: Pt lying comfortably, NAD.  ENMT: PERRL, +EOMI.  NECK: soft, Supple, No JVD,   CHEST/LUNG: Clear to auscultation bilaterally; No wheezing.  HEART: S1S2+, Regular rate and rhythm; No murmurs.  ABDOMEN: Soft, Nontender, Nondistended; Bowel sounds present.  MUSCULOSKELETAL: Normal range of motion.  SKIN: No rashes or lesions. Dryness on LE  NEURO: AAOX3, no focal deficits, no motor r sensory loss.  PSYCH: normal mood.

## 2018-04-04 LAB
ALBUMIN SERPL ELPH-MCNC: 3.7 G/DL — SIGNIFICANT CHANGE UP (ref 3.3–5.2)
ALP SERPL-CCNC: 107 U/L — SIGNIFICANT CHANGE UP (ref 40–120)
ALT FLD-CCNC: 7 U/L — SIGNIFICANT CHANGE UP
ANION GAP SERPL CALC-SCNC: 13 MMOL/L — SIGNIFICANT CHANGE UP (ref 5–17)
AST SERPL-CCNC: 21 U/L — SIGNIFICANT CHANGE UP
BILIRUB SERPL-MCNC: 0.5 MG/DL — SIGNIFICANT CHANGE UP (ref 0.4–2)
BUN SERPL-MCNC: 44 MG/DL — HIGH (ref 8–20)
CALCIUM SERPL-MCNC: 8.7 MG/DL — SIGNIFICANT CHANGE UP (ref 8.6–10.2)
CHLORIDE SERPL-SCNC: 109 MMOL/L — HIGH (ref 98–107)
CHOLEST SERPL-MCNC: 124 MG/DL — SIGNIFICANT CHANGE UP (ref 110–199)
CO2 SERPL-SCNC: 21 MMOL/L — LOW (ref 22–29)
CREAT SERPL-MCNC: 2.54 MG/DL — HIGH (ref 0.5–1.3)
CULTURE RESULTS: SIGNIFICANT CHANGE UP
GLUCOSE BLDC GLUCOMTR-MCNC: 133 MG/DL — HIGH (ref 70–99)
GLUCOSE BLDC GLUCOMTR-MCNC: 159 MG/DL — HIGH (ref 70–99)
GLUCOSE BLDC GLUCOMTR-MCNC: 164 MG/DL — HIGH (ref 70–99)
GLUCOSE BLDC GLUCOMTR-MCNC: 210 MG/DL — HIGH (ref 70–99)
GLUCOSE SERPL-MCNC: 130 MG/DL — HIGH (ref 70–115)
HBA1C BLD-MCNC: 7.3 % — HIGH (ref 4–5.6)
HCT VFR BLD CALC: 23.8 % — LOW (ref 42–52)
HDLC SERPL-MCNC: 27 MG/DL — LOW
HGB BLD-MCNC: 7.2 G/DL — LOW (ref 14–18)
LIPID PNL WITH DIRECT LDL SERPL: 78 MG/DL — SIGNIFICANT CHANGE UP
MAGNESIUM SERPL-MCNC: 2.1 MG/DL — SIGNIFICANT CHANGE UP (ref 1.6–2.6)
MCHC RBC-ENTMCNC: 19.6 PG — LOW (ref 27–31)
MCHC RBC-ENTMCNC: 30.3 G/DL — LOW (ref 32–36)
MCV RBC AUTO: 64.9 FL — LOW (ref 80–94)
PLATELET # BLD AUTO: 293 K/UL — SIGNIFICANT CHANGE UP (ref 150–400)
POTASSIUM SERPL-MCNC: 5 MMOL/L — SIGNIFICANT CHANGE UP (ref 3.5–5.3)
POTASSIUM SERPL-SCNC: 5 MMOL/L — SIGNIFICANT CHANGE UP (ref 3.5–5.3)
PROT SERPL-MCNC: 7.5 G/DL — SIGNIFICANT CHANGE UP (ref 6.6–8.7)
RBC # BLD: 3.67 M/UL — LOW (ref 4.6–6.2)
RBC # FLD: 18.5 % — HIGH (ref 11–15.6)
SODIUM SERPL-SCNC: 143 MMOL/L — SIGNIFICANT CHANGE UP (ref 135–145)
SPECIMEN SOURCE: SIGNIFICANT CHANGE UP
TOTAL CHOLESTEROL/HDL RATIO MEASUREMENT: 5 RATIO — SIGNIFICANT CHANGE UP (ref 3.4–9.6)
TRIGL SERPL-MCNC: 95 MG/DL — SIGNIFICANT CHANGE UP (ref 10–200)
WBC # BLD: 7.7 K/UL — SIGNIFICANT CHANGE UP (ref 4.8–10.8)
WBC # FLD AUTO: 7.7 K/UL — SIGNIFICANT CHANGE UP (ref 4.8–10.8)

## 2018-04-04 PROCEDURE — 99233 SBSQ HOSP IP/OBS HIGH 50: CPT

## 2018-04-04 RX ORDER — OXYCODONE AND ACETAMINOPHEN 5; 325 MG/1; MG/1
1 TABLET ORAL EVERY 4 HOURS
Qty: 0 | Refills: 0 | Status: DISCONTINUED | OUTPATIENT
Start: 2018-04-04 | End: 2018-04-06

## 2018-04-04 RX ORDER — TAMSULOSIN HYDROCHLORIDE 0.4 MG/1
0.8 CAPSULE ORAL AT BEDTIME
Qty: 0 | Refills: 0 | Status: DISCONTINUED | OUTPATIENT
Start: 2018-04-04 | End: 2018-04-13

## 2018-04-04 RX ORDER — ATORVASTATIN CALCIUM 80 MG/1
20 TABLET, FILM COATED ORAL AT BEDTIME
Qty: 0 | Refills: 0 | Status: DISCONTINUED | OUTPATIENT
Start: 2018-04-04 | End: 2018-04-13

## 2018-04-04 RX ADMIN — IRON SUCROSE 110 MILLIGRAM(S): 20 INJECTION, SOLUTION INTRAVENOUS at 15:24

## 2018-04-04 RX ADMIN — ONDANSETRON 4 MILLIGRAM(S): 8 TABLET, FILM COATED ORAL at 06:11

## 2018-04-04 RX ADMIN — Medication 1: at 12:52

## 2018-04-04 RX ADMIN — OXYCODONE AND ACETAMINOPHEN 1 TABLET(S): 5; 325 TABLET ORAL at 22:39

## 2018-04-04 RX ADMIN — Medication 250 MILLIGRAM(S): at 17:14

## 2018-04-04 RX ADMIN — OXYCODONE AND ACETAMINOPHEN 1 TABLET(S): 5; 325 TABLET ORAL at 17:14

## 2018-04-04 RX ADMIN — Medication 250 MILLIGRAM(S): at 10:53

## 2018-04-04 RX ADMIN — ATORVASTATIN CALCIUM 20 MILLIGRAM(S): 80 TABLET, FILM COATED ORAL at 22:27

## 2018-04-04 RX ADMIN — Medication 20 MILLILITER(S): at 01:28

## 2018-04-04 RX ADMIN — Medication 2: at 17:23

## 2018-04-04 RX ADMIN — CEFTRIAXONE 100 GRAM(S): 500 INJECTION, POWDER, FOR SOLUTION INTRAMUSCULAR; INTRAVENOUS at 06:11

## 2018-04-04 RX ADMIN — OXYCODONE AND ACETAMINOPHEN 1 TABLET(S): 5; 325 TABLET ORAL at 23:39

## 2018-04-04 RX ADMIN — TAMSULOSIN HYDROCHLORIDE 0.8 MILLIGRAM(S): 0.4 CAPSULE ORAL at 22:27

## 2018-04-04 NOTE — PROGRESS NOTE ADULT - SUBJECTIVE AND OBJECTIVE BOX
[facility] DIVISION OF KIDNEY DISEASES AND HYPERTENSION -- FOLLOW UP NOTE  --------------------------------------------------------------------------------  Chief Complaint: Hydronephrosis    24 hour events/subjective:  Pt seen and examined this AM  BL Hydronephrosis  Warren in place; good UOP; bloody; no clots      PAST HISTORY  --------------------------------------------------------------------------------  No significant changes to PMH, PSH, FHx, SHx, unless otherwise noted    ALLERGIES & MEDICATIONS  --------------------------------------------------------------------------------  Allergies    amoxicillin (Rash)    Intolerances      Standing Inpatient Medications  atorvastatin 20 milliGRAM(s) Oral at bedtime  cefTRIAXone   IVPB 1 Gram(s) IV Intermittent every 24 hours  dextrose 5%. 1000 milliLiter(s) IV Continuous <Continuous>  dextrose 50% Injectable 12.5 Gram(s) IV Push once  dextrose 50% Injectable 25 Gram(s) IV Push once  dextrose 50% Injectable 25 Gram(s) IV Push once  insulin lispro (HumaLOG) corrective regimen sliding scale   SubCutaneous three times a day before meals  iron sucrose IVPB 200 milliGRAM(s) IV Intermittent every 24 hours  ondansetron Injectable 4 milliGRAM(s) IV Push every 8 hours  saccharomyces boulardii 250 milliGRAM(s) Oral two times a day  sodium chloride 0.9%. 1000 milliLiter(s) IV Continuous <Continuous>  tamsulosin 0.8 milliGRAM(s) Oral at bedtime    PRN Inpatient Medications  acetaminophen   Tablet 650 milliGRAM(s) Oral every 6 hours PRN  acetaminophen   Tablet. 650 milliGRAM(s) Oral every 6 hours PRN  dextrose Gel 1 Dose(s) Oral once PRN  glucagon  Injectable 1 milliGRAM(s) IntraMuscular once PRN      REVIEW OF SYSTEMS  --------------------------------------------------------------------------------  Gen: No weight changes, fatigue, fevers/chills, weakness  Skin: No rashes  Head/Eyes/Ears/Mouth: No headache; Normal hearing; Normal vision w/o blurriness; No sinus pain/discomfort, sore throat  Respiratory: No dyspnea, cough, wheezing, hemoptysis  CV: No chest pain, PND, orthopnea  GI: No abdominal pain, diarrhea, constipation, nausea, vomiting, melena, hematochezia  : No increased frequency, dysuria, hematuria, nocturia  MSK: No joint pain/swelling; no back pain; no edema  Neuro: No dizziness/lightheadedness, weakness, seizures, numbness, tingling  Heme: No easy bruising or bleeding  Endo: No heat/cold intolerance  Psych: No significant nervousness, anxiety, stress, depression    All other systems were reviewed and are negative, except as noted.    VITALS/PHYSICAL EXAM  --------------------------------------------------------------------------------  T(C): 36.7 (04-04-18 @ 07:53), Max: 37.1 (04-03-18 @ 16:17)  HR: 78 (04-04-18 @ 07:53) (74 - 85)  BP: 126/68 (04-04-18 @ 07:53) (126/68 - 145/74)  RR: 18 (04-04-18 @ 07:53) (18 - 20)  SpO2: 98% (04-04-18 @ 07:53) (92% - 100%)  Wt(kg): --  Height (cm): 180.34 (04-03-18 @ 00:25)  Weight (kg): 108.9 (04-03-18 @ 00:25)  BMI (kg/m2): 33.5 (04-03-18 @ 00:25)  BSA (m2): 2.28 (04-03-18 @ 00:25)      04-03-18 @ 07:01  -  04-04-18 @ 07:00  --------------------------------------------------------  IN: 1540 mL / OUT: 4450 mL / NET: -2910 mL    04-04-18 @ 07:01  -  04-04-18 @ 14:40  --------------------------------------------------------  IN: 360 mL / OUT: 1800 mL / NET: -1440 mL      Physical Exam:  	Gen: NAD, well-appearing  	HEENT: PERRL, supple neck, clear oropharynx  	Pulm: CTA B/L  	CV: RRR, S1S2; no rub  	Back: No spinal or CVA tenderness; no sacral edema  	Abd: +BS, soft, nontender/nondistended  	: No suprapubic tenderness  	UE: Warm, FROM, no clubbing, intact strength; no edema; no asterixis  	LE: Warm, FROM, no clubbing, intact strength; no edema  	Neuro: No focal deficits, intact gait  	Psych: Normal affect and mood  	Skin: Warm, without rashes  	Vascular access:    LABS/STUDIES  --------------------------------------------------------------------------------              7.2    7.7   >-----------<  293      [04-04-18 @ 07:17]              23.8     143  |  109  |  44.0  ----------------------------<  130      [04-04-18 @ 07:17]  5.0   |  21.0  |  2.54        Ca     8.7     [04-04-18 @ 07:17]      Mg     2.1     [04-04-18 @ 07:17]    TPro  7.5  /  Alb  3.7  /  TBili  0.5  /  DBili  x   /  AST  21  /  ALT  7   /  AlkPhos  107  [04-04-18 @ 07:17]    PT/INR: PT 13.5 , INR 1.22       [04-03-18 @ 11:56]  PTT: 27.6       [04-03-18 @ 11:56]      Creatinine Trend:  SCr 2.54 [04-04 @ 07:17]  SCr 2.55 [04-03 @ 04:44]    Urinalysis - [04-03-18 @ 04:22]      Color Yellow / Appearance Clear / SG 1.010 / pH 6.0      Gluc Negative / Ketone Negative  / Bili Negative / Urobili Negative       Blood Moderate / Protein 30 / Leuk Est Moderate / Nitrite Negative      RBC 0-2 / WBC >50 / Hyaline  / Gran  / Sq Epi  / Non Sq Epi Negative / Bacteria Occasional    Urine Creatinine 64      [04-03-18 @ 08:22]  Urine Protein 51.0      [04-03-18 @ 08:22]  Urine Sodium 45      [04-03-18 @ 08:22]  Urine Chloride 31      [04-03-18 @ 08:22]  Urine Osmolality 297      [04-03-18 @ 08:23]    Iron 23, TIBC 466, %sat 5      [04-03-18 @ 11:56]  Ferritin 17      [04-03-18 @ 11:56]  HbA1c 7.3      [04-04-18 @ 07:17]  Lipid: chol 124, TG 95, HDL 27, LDL 78      [04-04-18 @ 07:17]

## 2018-04-04 NOTE — PROGRESS NOTE ADULT - SUBJECTIVE AND OBJECTIVE BOX
ANTOINETTE WILSON    [id number]    67y      Male    INTERVAL HPI/OVERNIGHT EVENTS:    patient being seen for bilateral hydro, uti, ckd and med management. PAtient seen at bedside and denies any complaints.     last 24 hours patient is afebrile.       REVIEW OF SYSTEMS:    CONSTITUTIONAL: No fever, weight loss, or fatigue  RESPIRATORY: No cough, wheezing, hemoptysis; No shortness of breath  CARDIOVASCULAR: No chest pain, palpitations  GASTROINTESTINAL: No abdominal or epigastric pain. No nausea, vomiting  NEUROLOGICAL: No headaches, memory loss, loss of strength.  MISCELLANEOUS:      Vital Signs Last 24 Hrs  T(C): 36.7 (2018 07:53), Max: 37.1 (2018 16:17)  T(F): 98.1 (2018 07:53), Max: 98.7 (2018 16:17)  HR: 78 (2018 07:53) (74 - 85)  BP: 126/68 (2018 07:53) (126/68 - 145/74)  BP(mean): --  RR: 18 (2018 07:53) (18 - 20)  SpO2: 98% (2018 07:53) (92% - 100%)    PHYSICAL EXAM:      	GENERAL: Pt lying comfortably, NAD.  	ENMT: PERRL, +EOMI.  	NECK: soft, Supple, No JVD,   	CHEST/LUNG: Clear to auscultation bilaterally; No wheezing.  	HEART: S1S2+, Regular rate and rhythm; No murmurs.  	ABDOMEN: Soft, Nontender, Nondistended; Bowel sounds present.  	MUSCULOSKELETAL: Normal range of motion.  	SKIN: No rashes or lesions. Dryness on LE  	NEURO: AAOX3, no focal deficits,  PSYCH: normal mood.      LABS:                        7.2    7.7   )-----------( 293      ( 2018 07:17 )             23.8     04-04    143  |  109<H>  |  44.0<H>  ----------------------------<  130<H>  5.0   |  21.0<L>  |  2.54<H>    Ca    8.7      2018 07:17  Mg     2.1     04-04    TPro  7.5  /  Alb  3.7  /  TBili  0.5  /  DBili  x   /  AST  21  /  ALT  7   /  AlkPhos  107  04-04    PT/INR - ( 2018 11:56 )   PT: 13.5 sec;   INR: 1.22 ratio         PTT - ( 2018 11:56 )  PTT:27.6 sec  Urinalysis Basic - ( 2018 04:22 )    Color: Yellow / Appearance: Clear / S.010 / pH: x  Gluc: x / Ketone: Negative  / Bili: Negative / Urobili: Negative mg/dL   Blood: x / Protein: 30 mg/dL / Nitrite: Negative   Leuk Esterase: Moderate / RBC: 0-2 /HPF / WBC >50   Sq Epi: x / Non Sq Epi: Negative / Bacteria: Occasional        MEDICATIONS  (STANDING):  cefTRIAXone   IVPB 1 Gram(s) IV Intermittent every 24 hours  dextrose 5%. 1000 milliLiter(s) (50 mL/Hr) IV Continuous <Continuous>  dextrose 50% Injectable 12.5 Gram(s) IV Push once  dextrose 50% Injectable 25 Gram(s) IV Push once  dextrose 50% Injectable 25 Gram(s) IV Push once  insulin lispro (HumaLOG) corrective regimen sliding scale   SubCutaneous three times a day before meals  iron sucrose IVPB 200 milliGRAM(s) IV Intermittent every 24 hours  ondansetron Injectable 4 milliGRAM(s) IV Push every 8 hours  saccharomyces boulardii 250 milliGRAM(s) Oral two times a day  sodium chloride 0.9%. 1000 milliLiter(s) (100 mL/Hr) IV Continuous <Continuous>  tamsulosin 0.8 milliGRAM(s) Oral at bedtime    MEDICATIONS  (PRN):  acetaminophen   Tablet 650 milliGRAM(s) Oral every 6 hours PRN For Temp greater than 38 C (100.4 F)  acetaminophen   Tablet. 650 milliGRAM(s) Oral every 6 hours PRN Mild Pain (1 - 3)  dextrose Gel 1 Dose(s) Oral once PRN Blood Glucose LESS THAN 70 milliGRAM(s)/deciliter  glucagon  Injectable 1 milliGRAM(s) IntraMuscular once PRN Glucose LESS THAN 70 milligrams/deciliter      RADIOLOGY & ADDITIONAL TESTS:    gram pos cocci in clusters

## 2018-04-04 NOTE — PROGRESS NOTE ADULT - PROBLEM SELECTOR PLAN 1
Maintain bourne until creatinine nadirs, consider renal/bladder sono with bourne in place once creat decreased to f/u hydro

## 2018-04-04 NOTE — PROGRESS NOTE ADULT - SUBJECTIVE AND OBJECTIVE BOX
Patient seen and examined  Warren in place draining light tinged urine, no clots  c/o pain when moving with catheter                          7.2    7.7   )-----------( 293      ( 04 Apr 2018 07:17 )             23.8   04-04    143  |  109<H>  |  44.0<H>  ----------------------------<  130<H>  5.0   |  21.0<L>  |  2.54<H>    Ca    8.7      04 Apr 2018 07:17  Mg     2.1     04-04    TPro  7.5  /  Alb  3.7  /  TBili  0.5  /  DBili  x   /  AST  21  /  ALT  7   /  AlkPhos  107  04-04    u/o 1800 cc last shift    On tamsulosin    Pt states he is followed by urologist at [facility], Dr. Carr. + prior urinary retention

## 2018-04-05 LAB
ANION GAP SERPL CALC-SCNC: 15 MMOL/L — SIGNIFICANT CHANGE UP (ref 5–17)
BUN SERPL-MCNC: 42 MG/DL — HIGH (ref 8–20)
CALCIUM SERPL-MCNC: 8.6 MG/DL — SIGNIFICANT CHANGE UP (ref 8.6–10.2)
CHLORIDE SERPL-SCNC: 108 MMOL/L — HIGH (ref 98–107)
CO2 SERPL-SCNC: 18 MMOL/L — LOW (ref 22–29)
CREAT SERPL-MCNC: 2.43 MG/DL — HIGH (ref 0.5–1.3)
GLUCOSE BLDC GLUCOMTR-MCNC: 121 MG/DL — HIGH (ref 70–99)
GLUCOSE BLDC GLUCOMTR-MCNC: 150 MG/DL — HIGH (ref 70–99)
GLUCOSE BLDC GLUCOMTR-MCNC: 183 MG/DL — HIGH (ref 70–99)
GLUCOSE BLDC GLUCOMTR-MCNC: 185 MG/DL — HIGH (ref 70–99)
GLUCOSE SERPL-MCNC: 122 MG/DL — HIGH (ref 70–115)
HCT VFR BLD CALC: 22.4 % — LOW (ref 42–52)
HGB BLD-MCNC: 6.9 G/DL — CRITICAL LOW (ref 14–18)
MAGNESIUM SERPL-MCNC: 2.1 MG/DL — SIGNIFICANT CHANGE UP (ref 1.6–2.6)
MCHC RBC-ENTMCNC: 20.1 PG — LOW (ref 27–31)
MCHC RBC-ENTMCNC: 30.8 G/DL — LOW (ref 32–36)
MCV RBC AUTO: 65.1 FL — LOW (ref 80–94)
PLATELET # BLD AUTO: 291 K/UL — SIGNIFICANT CHANGE UP (ref 150–400)
POTASSIUM SERPL-MCNC: 5.4 MMOL/L — HIGH (ref 3.5–5.3)
POTASSIUM SERPL-SCNC: 5.4 MMOL/L — HIGH (ref 3.5–5.3)
RBC # BLD: 3.44 M/UL — LOW (ref 4.6–6.2)
RBC # FLD: 18.5 % — HIGH (ref 11–15.6)
SODIUM SERPL-SCNC: 141 MMOL/L — SIGNIFICANT CHANGE UP (ref 135–145)
TSH SERPL-MCNC: 3.59 UIU/ML — SIGNIFICANT CHANGE UP (ref 0.27–4.2)
WBC # BLD: 8.8 K/UL — SIGNIFICANT CHANGE UP (ref 4.8–10.8)
WBC # FLD AUTO: 8.8 K/UL — SIGNIFICANT CHANGE UP (ref 4.8–10.8)

## 2018-04-05 PROCEDURE — 99233 SBSQ HOSP IP/OBS HIGH 50: CPT

## 2018-04-05 RX ORDER — POLYETHYLENE GLYCOL 3350 17 G/17G
17 POWDER, FOR SOLUTION ORAL DAILY
Qty: 0 | Refills: 0 | Status: DISCONTINUED | OUTPATIENT
Start: 2018-04-05 | End: 2018-04-13

## 2018-04-05 RX ORDER — MORPHINE SULFATE 50 MG/1
2 CAPSULE, EXTENDED RELEASE ORAL EVERY 4 HOURS
Qty: 0 | Refills: 0 | Status: DISCONTINUED | OUTPATIENT
Start: 2018-04-05 | End: 2018-04-07

## 2018-04-05 RX ORDER — SODIUM BICARBONATE 1 MEQ/ML
0.17 SYRINGE (ML) INTRAVENOUS
Qty: 150 | Refills: 0 | Status: DISCONTINUED | OUTPATIENT
Start: 2018-04-05 | End: 2018-04-08

## 2018-04-05 RX ORDER — PHENAZOPYRIDINE HCL 100 MG
100 TABLET ORAL ONCE
Qty: 0 | Refills: 0 | Status: COMPLETED | OUTPATIENT
Start: 2018-04-05 | End: 2018-04-05

## 2018-04-05 RX ORDER — PHENAZOPYRIDINE HCL 100 MG
200 TABLET ORAL EVERY 8 HOURS
Qty: 0 | Refills: 0 | Status: DISCONTINUED | OUTPATIENT
Start: 2018-04-05 | End: 2018-04-05

## 2018-04-05 RX ORDER — FOLIC ACID 0.8 MG
1 TABLET ORAL DAILY
Qty: 0 | Refills: 0 | Status: DISCONTINUED | OUTPATIENT
Start: 2018-04-05 | End: 2018-04-13

## 2018-04-05 RX ORDER — ERYTHROPOIETIN 10000 [IU]/ML
40000 INJECTION, SOLUTION INTRAVENOUS; SUBCUTANEOUS DAILY
Qty: 0 | Refills: 0 | Status: COMPLETED | OUTPATIENT
Start: 2018-04-05 | End: 2018-04-07

## 2018-04-05 RX ADMIN — Medication 1: at 17:21

## 2018-04-05 RX ADMIN — OXYCODONE AND ACETAMINOPHEN 1 TABLET(S): 5; 325 TABLET ORAL at 21:38

## 2018-04-05 RX ADMIN — Medication 125 MEQ/KG/HR: at 12:53

## 2018-04-05 RX ADMIN — CEFTRIAXONE 100 GRAM(S): 500 INJECTION, POWDER, FOR SOLUTION INTRAMUSCULAR; INTRAVENOUS at 05:42

## 2018-04-05 RX ADMIN — SODIUM CHLORIDE 100 MILLILITER(S): 9 INJECTION INTRAMUSCULAR; INTRAVENOUS; SUBCUTANEOUS at 05:41

## 2018-04-05 RX ADMIN — OXYCODONE AND ACETAMINOPHEN 1 TABLET(S): 5; 325 TABLET ORAL at 12:53

## 2018-04-05 RX ADMIN — Medication 1 MILLIGRAM(S): at 12:53

## 2018-04-05 RX ADMIN — Medication 250 MILLIGRAM(S): at 05:41

## 2018-04-05 RX ADMIN — MORPHINE SULFATE 2 MILLIGRAM(S): 50 CAPSULE, EXTENDED RELEASE ORAL at 16:30

## 2018-04-05 RX ADMIN — TAMSULOSIN HYDROCHLORIDE 0.8 MILLIGRAM(S): 0.4 CAPSULE ORAL at 21:36

## 2018-04-05 RX ADMIN — IRON SUCROSE 110 MILLIGRAM(S): 20 INJECTION, SOLUTION INTRAVENOUS at 13:41

## 2018-04-05 RX ADMIN — ERYTHROPOIETIN 40000 UNIT(S): 10000 INJECTION, SOLUTION INTRAVENOUS; SUBCUTANEOUS at 12:07

## 2018-04-05 RX ADMIN — POLYETHYLENE GLYCOL 3350 17 GRAM(S): 17 POWDER, FOR SOLUTION ORAL at 17:21

## 2018-04-05 RX ADMIN — OXYCODONE AND ACETAMINOPHEN 1 TABLET(S): 5; 325 TABLET ORAL at 13:53

## 2018-04-05 RX ADMIN — ATORVASTATIN CALCIUM 20 MILLIGRAM(S): 80 TABLET, FILM COATED ORAL at 21:38

## 2018-04-05 RX ADMIN — OXYCODONE AND ACETAMINOPHEN 1 TABLET(S): 5; 325 TABLET ORAL at 22:30

## 2018-04-05 RX ADMIN — Medication 100 MILLIGRAM(S): at 06:27

## 2018-04-05 RX ADMIN — MORPHINE SULFATE 2 MILLIGRAM(S): 50 CAPSULE, EXTENDED RELEASE ORAL at 16:45

## 2018-04-05 RX ADMIN — OXYCODONE AND ACETAMINOPHEN 1 TABLET(S): 5; 325 TABLET ORAL at 03:56

## 2018-04-05 RX ADMIN — OXYCODONE AND ACETAMINOPHEN 1 TABLET(S): 5; 325 TABLET ORAL at 02:56

## 2018-04-05 NOTE — PHYSICAL THERAPY INITIAL EVALUATION ADULT - PERTINENT HX OF CURRENT PROBLEM, REHAB EVAL
Briefly he is 68 y/o male with PMHx of DM-II, legally blind, reported no other PMHx and not on any medicine at home though has not seen any PMD in years, presented to ED with vomiting, X 1 episode. Per son patient had NBNB vomiting episode at home yesterday and hence he was brought into ED. In ED found to have renal failure acute vs chronic, UTI and admitted to medicine for further management. Nephrology consulted from ED.

## 2018-04-05 NOTE — PROGRESS NOTE ADULT - SUBJECTIVE AND OBJECTIVE BOX
[facility] DIVISION OF KIDNEY DISEASES AND HYPERTENSION -- FOLLOW UP NOTE  --------------------------------------------------------------------------------  Chief Complaint: Hydronephrosis    24 hour events/subjective:  Pt seen and examined this AM  BL Hydronephrosis  Warren in place; good UOP; bloody; no clots      PAST HISTORY  --------------------------------------------------------------------------------  No significant changes to PMH, PSH, FHx, SHx, unless otherwise noted    ALLERGIES & MEDICATIONS  --------------------------------------------------------------------------------  Allergies    amoxicillin (Rash)    Intolerances      Standing Inpatient Medications  atorvastatin 20 milliGRAM(s) Oral at bedtime  cefTRIAXone   IVPB 1 Gram(s) IV Intermittent every 24 hours  dextrose 5%. 1000 milliLiter(s) IV Continuous <Continuous>  dextrose 50% Injectable 12.5 Gram(s) IV Push once  dextrose 50% Injectable 25 Gram(s) IV Push once  dextrose 50% Injectable 25 Gram(s) IV Push once  insulin lispro (HumaLOG) corrective regimen sliding scale   SubCutaneous three times a day before meals  iron sucrose IVPB 200 milliGRAM(s) IV Intermittent every 24 hours  ondansetron Injectable 4 milliGRAM(s) IV Push every 8 hours  saccharomyces boulardii 250 milliGRAM(s) Oral two times a day  sodium chloride 0.9%. 1000 milliLiter(s) IV Continuous <Continuous>  tamsulosin 0.8 milliGRAM(s) Oral at bedtime    PRN Inpatient Medications  acetaminophen   Tablet 650 milliGRAM(s) Oral every 6 hours PRN  acetaminophen   Tablet. 650 milliGRAM(s) Oral every 6 hours PRN  dextrose Gel 1 Dose(s) Oral once PRN  glucagon  Injectable 1 milliGRAM(s) IntraMuscular once PRN      REVIEW OF SYSTEMS  --------------------------------------------------------------------------------  Gen: No weight changes, fatigue, fevers/chills, weakness  Skin: No rashes  Head/Eyes/Ears/Mouth: No headache; Normal hearing; Normal vision w/o blurriness; No sinus pain/discomfort, sore throat  Respiratory: No dyspnea, cough, wheezing, hemoptysis  CV: No chest pain, PND, orthopnea  GI: No abdominal pain, diarrhea, constipation, nausea, vomiting, melena, hematochezia  : No increased frequency, dysuria, hematuria, nocturia  MSK: No joint pain/swelling; no back pain; no edema  Neuro: No dizziness/lightheadedness, weakness, seizures, numbness, tingling  Heme: No easy bruising or bleeding  Endo: No heat/cold intolerance  Psych: No significant nervousness, anxiety, stress, depression    All other systems were reviewed and are negative, except as noted.    VITALS/PHYSICAL EXAM  --------------------------------------------------------------------------------  Vital Signs Last 48  Hrs  T(C): 36.5 (2018 07:30), Max: 36.8 (2018 22:22)  T(F): 97.7 (2018 07:30), Max: 98.3 (2018 22:22)  HR: 84 (2018 07:30) (76 - 90)  BP: 134/75 (2018 07:30) (120/78 - 148/76)  BP(mean): --  RR: 18 (2018 07:30) (18 - 18)  SpO2: 94% (2018 07:30) (94% - 99%)    T(C): 36.7 (18 @ 07:53), Max: 37.1 (18 @ 16:17)  HR: 78 (18 @ 07:53) (74 - 85)  BP: 126/68 (18 @ 07:53) (126/68 - 145/74)  RR: 18 (18 @ 07:53) (18 - 20)  SpO2: 98% (18 @ 07:53) (92% - 100%)    Height (cm): 180.34 (18 @ 00:25)  Weight (kg): 108.9 (18 @ 00:25)  BMI (kg/m2): 33.5 (18 @ 00:25)  BSA (m2): 2.28 (18 @ 00:25)      18 @ 07:01  -  18 @ 07:00  --------------------------------------------------------  IN: 1540 mL / OUT: 4450 mL / NET: -2910 mL    18 @ 07:01  -  18 @ 14:40  --------------------------------------------------------  IN: 360 mL / OUT: 1800 mL / NET: -1440 mL      Physical Exam:  	Gen: NAD, well-appearing  	HEENT: PERRL, supple neck, clear oropharynx  	Pulm: CTA B/L  	CV: RRR, S1S2; no rub  	Back: No spinal or CVA tenderness; no sacral edema  	Abd: +BS, soft, nontender/nondistended  	: No suprapubic tenderness  	UE: Warm, FROM, no clubbing, intact strength; no edema; no asterixis  	LE: Warm, FROM, no clubbing, intact strength; no edema  	Neuro: No focal deficits, intact gait  	Psych: Normal affect and mood  	Skin: Warm, without rashes  	Vascular access:    LABS/STUDIES  --------------------------------------------------------------------------------    141    |  108<H>  |  42.0<H>  ----------------------------<  122<H>  Ca:8.6   (2018 07:49)  5.4<H>   |  18.0<L>  |  2.43<H>    eGFR if : 30 <L>    TPro  7.5    /  Alb  3.7    /  TBili  0.5    /  DBili  x      /  AST  21     /  ALT  7      /  AlkPhos  107    2018 07:17                        6.9<LL>  8.8   )-----------( 291      ( 2018 07:49 )             22.4<L>    Phos:-- M.1 mg/dL PTH:-- Uric acid:-- Serum Osm:--  Ferritin:-- Iron:-- TIBC:-- Tsat:--  B12:3.59 uIU/mL TSH:-- ( @ 07:49)    Urinalysis Basic - ( 2018 04:22 )  Color: Yellow / Appearance: Clear / S.010 / pH: x  Gluc: x / Ketone: Negative  / Bili: Negative / Urobili: Negative mg/dL   Blood: x / Protein: 30 mg/dL<!> / Nitrite: Negative   Leuk Esterase: Moderate<!> / RBC: 0-2 /HPF / WBC >50<!>   Sq Epi: x / Non Sq Epi: Negative / Bacteria: Occasional<!>      UProt:-- UCr:-- P/C Ratio:-- 24 hour Prot:-- UVol:-- CrCl:--  Jesse:-- UOsm:297 mosm/kg<L> UVol:-- UCl:-- UK:-- ( 08:23)                 7.2    7.7   >-----------<  293      [18 07:17]              23.8     143  |  109  |  44.0  ----------------------------<  130      [18 07:17]  5.0   |  21.0  |  2.54        Ca     8.7     [18:17]      Mg     2.1     [18 07:17]    TPro  7.5  /  Alb  3.7  /  TBili  0.5  /  DBili  x   /  AST  21  /  ALT  7   /  AlkPhos  107  [18 07:17]    PT/INR: PT 13.5 , INR 1.22       [18 11:56]  PTT: 27.6       [18 11:56]      Creatinine Trend:  SCr 2.54 [:17]  SCr 2.55 [:44]    Urinalysis - [18 04:22]      Color Yellow / Appearance Clear / SG 1.010 / pH 6.0      Gluc Negative / Ketone Negative  / Bili Negative / Urobili Negative       Blood Moderate / Protein 30 / Leuk Est Moderate / Nitrite Negative      RBC 0-2 / WBC >50 / Hyaline  / Gran  / Sq Epi  / Non Sq Epi Negative / Bacteria Occasional    Urine Creatinine 64      [18 08:22]  Urine Protein 51.0      [18 08:22]  Urine Sodium 45      [18 08:22]  Urine Chloride 31      [18 08:22]  Urine Osmolality 297      [18 08:23]    Iron 23, TIBC 466, %sat 5      [18 11:56]  Ferritin 17      [18 11:56]  HbA1c 7.3      [18 07:17]  Lipid: chol 124, TG 95, HDL 27, LDL 78      [18 @ 07:17]    PROCEDURE DATE:  2018          INTERPRETATION:  CLINICAL INFORMATION: Vomiting and weakness. History of   diabetes and hypertension with urinary incontinence.    COMPARISON: None available.    TECHNIQUE: Sonography of the kidneys and bladder.     FINDINGS:    Right kidney:  11.6 cm. There is mild to moderate right-sided   hydronephrosis. No shadowing intrarenal calcification is noted.    Left kidney:  13.3 cm. There is mild to moderate left-sided   hydronephrosis. No shadowing intrarenal calcification is noted.    Urinary bladder: There is dependent, nonvascular echogenicity within the   bladder.  The ureteral jets are not visualized.  The patient is not able to void for this exam.    IMPRESSION:     Bilateral hydronephrosis.    Dependent echogenicity within the bladder could reflect infection or   hemorrhage.        Assessment and Plan:     1.DMN, CKD - 4 .    2.Profound Fe - Deficiency anemia    3.HARLEEN - Bilateral Obstructive uropathy ( Chronic )    4.CKD - Stage ?    S ; IV Fe , Blood TX , Warren ,     DM Control,    Maintain IV Hydration,

## 2018-04-05 NOTE — PHYSICAL THERAPY INITIAL EVALUATION ADULT - ADDITIONAL COMMENTS
per pt, he lives with his ex-wife in a house with 4 steps to enter (+2 unreachable rails), then stays on 1st floor. he has a cane, RW he uses as needed. per ccc note, ex-wife works and pt has fired aides that were hired.

## 2018-04-05 NOTE — PROGRESS NOTE ADULT - SUBJECTIVE AND OBJECTIVE BOX
ANTOINETTE WILSON    [id number]    67y      Male    INTERVAL HPI/OVERNIGHT EVENTS:    patient being seen for bilateral hydro, uti, ckd and med management. Patient seen at bedside and complaining of penile pain at bourne site.       REVIEW OF SYSTEMS:    CONSTITUTIONAL: pain   RESPIRATORY: No cough, wheezing, hemoptysis; No shortness of breath  CARDIOVASCULAR: No chest pain, palpitations  GASTROINTESTINAL: No abdominal or epigastric pain. No nausea, vomiting  NEUROLOGICAL: No headaches, memory loss, loss of strength.  MISCELLANEOUS:      Vital Signs Last 24 Hrs  T(C): 36.5 (05 Apr 2018 07:30), Max: 36.8 (04 Apr 2018 22:22)  T(F): 97.7 (05 Apr 2018 07:30), Max: 98.3 (04 Apr 2018 22:22)  HR: 84 (05 Apr 2018 07:30) (76 - 90)  BP: 134/75 (05 Apr 2018 07:30) (120/78 - 148/76)  BP(mean): --  RR: 18 (05 Apr 2018 07:30) (18 - 18)  SpO2: 94% (05 Apr 2018 07:30) (94% - 99%)    PHYSICAL EXAM:  	GENERAL: Pt lying comfortably, NAD.  	NECK: soft, Supple, No JVD,   	CHEST/LUNG: Clear to auscultation bilaterally; No wheezing.  	HEART: S1S2+, Regular rate and rhythm; No murmurs.  	ABDOMEN: Soft, Nontender, Nondistended; Bowel sounds present.  	MUSCULOSKELETAL: Normal range of motion.  	SKIN: No rashes or lesions. Dryness on LE  	NEURO: AAOX3, no focal deficits,  PSYCH: normal mood.        LABS:                        6.9    8.8   )-----------( 291      ( 05 Apr 2018 07:49 )             22.4     04-05    141  |  108<H>  |  42.0<H>  ----------------------------<  122<H>  5.4<H>   |  18.0<L>  |  2.43<H>    Ca    8.6      05 Apr 2018 07:49  Mg     2.1     04-05    TPro  7.5  /  Alb  3.7  /  TBili  0.5  /  DBili  x   /  AST  21  /  ALT  7   /  AlkPhos  107  04-04          MEDICATIONS  (STANDING):  atorvastatin 20 milliGRAM(s) Oral at bedtime  dextrose 5%. 1000 milliLiter(s) (50 mL/Hr) IV Continuous <Continuous>  dextrose 50% Injectable 12.5 Gram(s) IV Push once  dextrose 50% Injectable 25 Gram(s) IV Push once  dextrose 50% Injectable 25 Gram(s) IV Push once  epoetin daniel Injectable 61600 Unit(s) SubCutaneous daily  folic acid 1 milliGRAM(s) Oral daily  insulin lispro (HumaLOG) corrective regimen sliding scale   SubCutaneous three times a day before meals  iron sucrose IVPB 200 milliGRAM(s) IV Intermittent every 24 hours  ondansetron Injectable 4 milliGRAM(s) IV Push every 8 hours  sodium bicarbonate  Infusion 0.172 mEq/kG/Hr (125 mL/Hr) IV Continuous <Continuous>  tamsulosin 0.8 milliGRAM(s) Oral at bedtime    MEDICATIONS  (PRN):  acetaminophen   Tablet 650 milliGRAM(s) Oral every 6 hours PRN For Temp greater than 38 C (100.4 F)  acetaminophen   Tablet. 650 milliGRAM(s) Oral every 6 hours PRN Mild Pain (1 - 3)  dextrose Gel 1 Dose(s) Oral once PRN Blood Glucose LESS THAN 70 milliGRAM(s)/deciliter  glucagon  Injectable 1 milliGRAM(s) IntraMuscular once PRN Glucose LESS THAN 70 milligrams/deciliter  morphine  - Injectable 2 milliGRAM(s) IV Push every 4 hours PRN Moderate Pain (4 - 6)  oxyCODONE    5 mG/acetaminophen 325 mG 1 Tablet(s) Oral every 4 hours PRN Moderate Pain (4 - 6)      RADIOLOGY & ADDITIONAL TESTS:

## 2018-04-05 NOTE — PHYSICAL THERAPY INITIAL EVALUATION ADULT - IMPAIRMENTS FOUND, PT EVAL
pt may have further impairments. refused assessment./gait, locomotion, and balance/poor safety awareness

## 2018-04-05 NOTE — CHART NOTE - NSCHARTNOTEFT_GEN_A_CORE
pt is s/p bourne catheter insertion due to hydronephrosis and HARLEEN.  Bourne has been inserted by urology service.  Pt c/o pain from base of penis to tip prior to insertion, but still c/o pain in same area not alleviated by percocet.  Pyridium ordered x one dose.

## 2018-04-05 NOTE — PHYSICAL THERAPY INITIAL EVALUATION ADULT - CRITERIA FOR SKILLED THERAPEUTIC INTERVENTIONS
functional limitations in following categories/risk reduction/prevention/rehab potential/impairments found/therapy frequency

## 2018-04-06 LAB
ANION GAP SERPL CALC-SCNC: 12 MMOL/L — SIGNIFICANT CHANGE UP (ref 5–17)
APPEARANCE UR: ABNORMAL
BACTERIA # UR AUTO: ABNORMAL
BILIRUB UR-MCNC: NEGATIVE — SIGNIFICANT CHANGE UP
BUN SERPL-MCNC: 38 MG/DL — HIGH (ref 8–20)
CALCIUM SERPL-MCNC: 8.2 MG/DL — LOW (ref 8.6–10.2)
CHLORIDE SERPL-SCNC: 105 MMOL/L — SIGNIFICANT CHANGE UP (ref 98–107)
CO2 SERPL-SCNC: 24 MMOL/L — SIGNIFICANT CHANGE UP (ref 22–29)
COLOR SPEC: ABNORMAL
CREAT ?TM UR-MCNC: 50 MG/DL — SIGNIFICANT CHANGE UP
CREAT SERPL-MCNC: 2.49 MG/DL — HIGH (ref 0.5–1.3)
DIFF PNL FLD: ABNORMAL
EPI CELLS # UR: SIGNIFICANT CHANGE UP
GLUCOSE BLDC GLUCOMTR-MCNC: 162 MG/DL — HIGH (ref 70–99)
GLUCOSE BLDC GLUCOMTR-MCNC: 198 MG/DL — HIGH (ref 70–99)
GLUCOSE BLDC GLUCOMTR-MCNC: 207 MG/DL — HIGH (ref 70–99)
GLUCOSE BLDC GLUCOMTR-MCNC: 240 MG/DL — HIGH (ref 70–99)
GLUCOSE SERPL-MCNC: 150 MG/DL — HIGH (ref 70–115)
GLUCOSE UR QL: NEGATIVE MG/DL — SIGNIFICANT CHANGE UP
HCT VFR BLD CALC: 20.4 % — CRITICAL LOW (ref 42–52)
HGB BLD-MCNC: 6.2 G/DL — CRITICAL LOW (ref 14–18)
KETONES UR-MCNC: ABNORMAL
LEUKOCYTE ESTERASE UR-ACNC: ABNORMAL
MAGNESIUM SERPL-MCNC: 1.9 MG/DL — SIGNIFICANT CHANGE UP (ref 1.6–2.6)
MCHC RBC-ENTMCNC: 19.9 PG — LOW (ref 27–31)
MCHC RBC-ENTMCNC: 30.4 G/DL — LOW (ref 32–36)
MCV RBC AUTO: 65.6 FL — LOW (ref 80–94)
NITRITE UR-MCNC: NEGATIVE — SIGNIFICANT CHANGE UP
OSMOLALITY UR: 408 MOSM/KG — SIGNIFICANT CHANGE UP (ref 300–1000)
PH UR: 7 — SIGNIFICANT CHANGE UP (ref 5–8)
PLATELET # BLD AUTO: 223 K/UL — SIGNIFICANT CHANGE UP (ref 150–400)
POTASSIUM SERPL-MCNC: 4.1 MMOL/L — SIGNIFICANT CHANGE UP (ref 3.5–5.3)
POTASSIUM SERPL-SCNC: 4.1 MMOL/L — SIGNIFICANT CHANGE UP (ref 3.5–5.3)
PROT ?TM UR-MCNC: 395 MG/DL — SIGNIFICANT CHANGE UP (ref 0–12)
PROT UR-MCNC: 500 MG/DL
PROT/CREAT UR-RTO: 7.9 RATIO — SIGNIFICANT CHANGE UP
RBC # BLD: 3.11 M/UL — LOW (ref 4.6–6.2)
RBC # FLD: 18.9 % — HIGH (ref 11–15.6)
RBC CASTS # UR COMP ASSIST: >50 /HPF (ref 0–4)
SODIUM SERPL-SCNC: 141 MMOL/L — SIGNIFICANT CHANGE UP (ref 135–145)
SODIUM UR-SCNC: 58 MMOL/L — SIGNIFICANT CHANGE UP
SP GR SPEC: 1 — LOW (ref 1.01–1.02)
UROBILINOGEN FLD QL: NEGATIVE MG/DL — SIGNIFICANT CHANGE UP
WBC # BLD: 8.6 K/UL — SIGNIFICANT CHANGE UP (ref 4.8–10.8)
WBC # FLD AUTO: 8.6 K/UL — SIGNIFICANT CHANGE UP (ref 4.8–10.8)
WBC UR QL: ABNORMAL

## 2018-04-06 PROCEDURE — 99233 SBSQ HOSP IP/OBS HIGH 50: CPT

## 2018-04-06 PROCEDURE — 76775 US EXAM ABDO BACK WALL LIM: CPT | Mod: 26

## 2018-04-06 RX ADMIN — Medication 1: at 18:03

## 2018-04-06 RX ADMIN — Medication 1 MILLIGRAM(S): at 12:28

## 2018-04-06 RX ADMIN — POLYETHYLENE GLYCOL 3350 17 GRAM(S): 17 POWDER, FOR SOLUTION ORAL at 12:28

## 2018-04-06 RX ADMIN — TAMSULOSIN HYDROCHLORIDE 0.8 MILLIGRAM(S): 0.4 CAPSULE ORAL at 21:22

## 2018-04-06 RX ADMIN — ATORVASTATIN CALCIUM 20 MILLIGRAM(S): 80 TABLET, FILM COATED ORAL at 21:22

## 2018-04-06 RX ADMIN — Medication 2: at 12:28

## 2018-04-06 RX ADMIN — OXYCODONE AND ACETAMINOPHEN 1 TABLET(S): 5; 325 TABLET ORAL at 23:18

## 2018-04-06 RX ADMIN — IRON SUCROSE 110 MILLIGRAM(S): 20 INJECTION, SOLUTION INTRAVENOUS at 15:00

## 2018-04-06 RX ADMIN — ERYTHROPOIETIN 40000 UNIT(S): 10000 INJECTION, SOLUTION INTRAVENOUS; SUBCUTANEOUS at 13:22

## 2018-04-06 RX ADMIN — Medication 1: at 09:00

## 2018-04-06 RX ADMIN — Medication 125 MEQ/KG/HR: at 09:00

## 2018-04-06 RX ADMIN — Medication 125 MEQ/KG/HR: at 21:23

## 2018-04-06 NOTE — PROGRESS NOTE ADULT - SUBJECTIVE AND OBJECTIVE BOX
ANTOINETTE WILSON    [id number]    67y      Male    INTERVAL HPI/OVERNIGHT EVENTS:    patient being seen for bilateral hydro, uti, ckd and med management. Patient seen at bedside and still refusing transfusions.     REVIEW OF SYSTEMS:    CONSTITUTIONAL: No fever, weight loss, or fatigue  RESPIRATORY: No cough, wheezing, hemoptysis; No shortness of breath  CARDIOVASCULAR: No chest pain, palpitations  GASTROINTESTINAL: No abdominal or epigastric pain. No nausea, vomiting  NEUROLOGICAL: No headaches, memory loss, loss of strength.  MISCELLANEOUS:      Vital Signs Last 24 Hrs  T(C): 36.4 (05 Apr 2018 23:21), Max: 36.7 (05 Apr 2018 20:00)  T(F): 97.5 (05 Apr 2018 23:21), Max: 98 (05 Apr 2018 20:00)  HR: 88 (05 Apr 2018 23:21) (82 - 88)  BP: 105/60 (05 Apr 2018 23:21) (105/60 - 135/79)  BP(mean): --  RR: 19 (05 Apr 2018 23:21) (18 - 19)  SpO2: 100% (05 Apr 2018 23:21) (100% - 100%)    PHYSICAL EXAM:    	GENERAL: Pt lying comfortably, NAD.  	NECK: soft, Supple, No JVD,   	CHEST/LUNG: Clear to auscultation bilaterally; No wheezing.  	HEART: S1S2+, Regular rate and rhythm; No murmurs.  	ABDOMEN: Soft, Nontender, Nondistended; Bowel sounds present.  	MUSCULOSKELETAL: Normal range of motion.  	SKIN: No rashes or lesions. Dryness on LE  	NEURO: AAOX3, no focal deficits,  PSYCH: normal mood.        LABS:                        6.2    8.6   )-----------( 223      ( 06 Apr 2018 07:02 )             20.4     04-06    141  |  105  |  38.0<H>  ----------------------------<  150<H>  4.1   |  24.0  |  2.49<H>    Ca    8.2<L>      06 Apr 2018 07:02  Mg     1.9     04-06      MEDICATIONS  (STANDING):  atorvastatin 20 milliGRAM(s) Oral at bedtime  dextrose 5%. 1000 milliLiter(s) (50 mL/Hr) IV Continuous <Continuous>  dextrose 50% Injectable 12.5 Gram(s) IV Push once  dextrose 50% Injectable 25 Gram(s) IV Push once  dextrose 50% Injectable 25 Gram(s) IV Push once  epoetin daniel Injectable 83401 Unit(s) SubCutaneous daily  folic acid 1 milliGRAM(s) Oral daily  insulin lispro (HumaLOG) corrective regimen sliding scale   SubCutaneous three times a day before meals  iron sucrose IVPB 200 milliGRAM(s) IV Intermittent every 24 hours  polyethylene glycol 3350 17 Gram(s) Oral daily  sodium bicarbonate  Infusion 0.172 mEq/kG/Hr (125 mL/Hr) IV Continuous <Continuous>  tamsulosin 0.8 milliGRAM(s) Oral at bedtime    MEDICATIONS  (PRN):  acetaminophen   Tablet 650 milliGRAM(s) Oral every 6 hours PRN For Temp greater than 38 C (100.4 F)  acetaminophen   Tablet. 650 milliGRAM(s) Oral every 6 hours PRN Mild Pain (1 - 3)  dextrose Gel 1 Dose(s) Oral once PRN Blood Glucose LESS THAN 70 milliGRAM(s)/deciliter  glucagon  Injectable 1 milliGRAM(s) IntraMuscular once PRN Glucose LESS THAN 70 milligrams/deciliter  morphine  - Injectable 2 milliGRAM(s) IV Push every 4 hours PRN Severe Pain (7 - 10)  oxyCODONE    5 mG/acetaminophen 325 mG 1 Tablet(s) Oral every 4 hours PRN Moderate Pain (4 - 6)      RADIOLOGY & ADDITIONAL TESTS:

## 2018-04-07 LAB
GLUCOSE BLDC GLUCOMTR-MCNC: 158 MG/DL — HIGH (ref 70–99)
GLUCOSE BLDC GLUCOMTR-MCNC: 194 MG/DL — HIGH (ref 70–99)
GLUCOSE BLDC GLUCOMTR-MCNC: 213 MG/DL — HIGH (ref 70–99)
GLUCOSE BLDC GLUCOMTR-MCNC: 220 MG/DL — HIGH (ref 70–99)

## 2018-04-07 PROCEDURE — 99233 SBSQ HOSP IP/OBS HIGH 50: CPT

## 2018-04-07 RX ORDER — LIDOCAINE HCL 20 MG/ML
1 VIAL (ML) INJECTION EVERY 12 HOURS
Qty: 0 | Refills: 0 | Status: DISCONTINUED | OUTPATIENT
Start: 2018-04-07 | End: 2018-04-07

## 2018-04-07 RX ORDER — LIDOCAINE 4 G/100G
1 CREAM TOPICAL EVERY 12 HOURS
Qty: 0 | Refills: 0 | Status: DISCONTINUED | OUTPATIENT
Start: 2018-04-07 | End: 2018-04-13

## 2018-04-07 RX ADMIN — Medication 2: at 18:33

## 2018-04-07 RX ADMIN — ATORVASTATIN CALCIUM 20 MILLIGRAM(S): 80 TABLET, FILM COATED ORAL at 21:21

## 2018-04-07 RX ADMIN — IRON SUCROSE 110 MILLIGRAM(S): 20 INJECTION, SOLUTION INTRAVENOUS at 13:17

## 2018-04-07 RX ADMIN — OXYCODONE AND ACETAMINOPHEN 1 TABLET(S): 5; 325 TABLET ORAL at 00:17

## 2018-04-07 RX ADMIN — Medication 125 MEQ/KG/HR: at 05:53

## 2018-04-07 RX ADMIN — TAMSULOSIN HYDROCHLORIDE 0.8 MILLIGRAM(S): 0.4 CAPSULE ORAL at 21:21

## 2018-04-07 RX ADMIN — Medication 1: at 08:46

## 2018-04-07 RX ADMIN — ERYTHROPOIETIN 40000 UNIT(S): 10000 INJECTION, SOLUTION INTRAVENOUS; SUBCUTANEOUS at 14:35

## 2018-04-07 RX ADMIN — POLYETHYLENE GLYCOL 3350 17 GRAM(S): 17 POWDER, FOR SOLUTION ORAL at 13:25

## 2018-04-07 RX ADMIN — LIDOCAINE 1 APPLICATION(S): 4 CREAM TOPICAL at 18:33

## 2018-04-07 RX ADMIN — Medication 2: at 13:23

## 2018-04-07 RX ADMIN — Medication 1 MILLIGRAM(S): at 13:16

## 2018-04-07 RX ADMIN — Medication 125 MEQ/KG/HR: at 21:21

## 2018-04-07 RX ADMIN — MORPHINE SULFATE 2 MILLIGRAM(S): 50 CAPSULE, EXTENDED RELEASE ORAL at 10:10

## 2018-04-07 NOTE — PROGRESS NOTE ADULT - SUBJECTIVE AND OBJECTIVE BOX
ANTOINETTE WILSON    [id number]    67y      Male    INTERVAL HPI/OVERNIGHT EVENTS:    patient being seen for bilateral hydro, uti, ckd and med management. Patient seen at bedside and still refusing transfusions. Patient complaining of penile pain.       REVIEW OF SYSTEMS:    CONSTITUTIONAL: pain   RESPIRATORY: No cough, wheezing, hemoptysis; No shortness of breath  CARDIOVASCULAR: No chest pain, palpitations  GASTROINTESTINAL: No abdominal or epigastric pain. No nausea, vomiting  NEUROLOGICAL: No headaches, memory loss, loss of strength.  MISCELLANEOUS:      Vital Signs Last 24 Hrs  T(C): 36.7 (2018 08:06), Max: 37.3 (2018 15:09)  T(F): 98.1 (2018 08:06), Max: 99.1 (2018 15:09)  HR: 88 (2018 08:06) (88 - 100)  BP: 142/63 (2018 08:06) (119/- - 148/87)  BP(mean): 61 (2018 15:09) (61 - 61)  RR: 18 (2018 08:06) (18 - 18)  SpO2: 97% (2018 08:06) (97% - 100%)    PHYSICAL EXAM:    	GENERAL: Pt lying comfortably, NAD.  	NECK: soft, Supple, No JVD,   	CHEST/LUNG: Clear to auscultation bilaterally; No wheezing.  	HEART: S1S2+, Regular rate and rhythm; No murmurs.  	ABDOMEN: Soft, Nontender, Nondistended; Bowel sounds present.  	MUSCULOSKELETAL: Normal range of motion.  	SKIN: No rashes or lesions. Dryness on LE  	NEURO: AAOX3, no focal deficits,  PSYCH: normal mood.        LABS:                        6.2    8.6   )-----------( 223      ( 2018 07:02 )             20.4     04-06    141  |  105  |  38.0<H>  ----------------------------<  150<H>  4.1   |  24.0  |  2.49<H>    Ca    8.2<L>      2018 07:02  Mg     1.9     04-06        Urinalysis Basic - ( 2018 21:28 )    Color: Red / Appearance: Cloudy / S.005 / pH: x  Gluc: x / Ketone: Trace  / Bili: Negative / Urobili: Negative mg/dL   Blood: x / Protein: 500 mg/dL / Nitrite: Negative   Leuk Esterase: Moderate / RBC: >50 /HPF / WBC 6-10   Sq Epi: x / Non Sq Epi: Occasional / Bacteria: Occasional          MEDICATIONS  (STANDING):  atorvastatin 20 milliGRAM(s) Oral at bedtime  dextrose 5%. 1000 milliLiter(s) (50 mL/Hr) IV Continuous <Continuous>  dextrose 50% Injectable 12.5 Gram(s) IV Push once  dextrose 50% Injectable 25 Gram(s) IV Push once  dextrose 50% Injectable 25 Gram(s) IV Push once  epoetin daniel Injectable 79058 Unit(s) SubCutaneous daily  folic acid 1 milliGRAM(s) Oral daily  insulin lispro (HumaLOG) corrective regimen sliding scale   SubCutaneous three times a day before meals  iron sucrose IVPB 200 milliGRAM(s) IV Intermittent every 24 hours  lidocaine 2% Jelly 1 milliLiter(s) IntraUrethral every 12 hours  polyethylene glycol 3350 17 Gram(s) Oral daily  sodium bicarbonate  Infusion 0.172 mEq/kG/Hr (125 mL/Hr) IV Continuous <Continuous>  tamsulosin 0.8 milliGRAM(s) Oral at bedtime    MEDICATIONS  (PRN):  acetaminophen   Tablet 650 milliGRAM(s) Oral every 6 hours PRN For Temp greater than 38 C (100.4 F)  acetaminophen   Tablet. 650 milliGRAM(s) Oral every 6 hours PRN Mild Pain (1 - 3)  dextrose Gel 1 Dose(s) Oral once PRN Blood Glucose LESS THAN 70 milliGRAM(s)/deciliter  glucagon  Injectable 1 milliGRAM(s) IntraMuscular once PRN Glucose LESS THAN 70 milligrams/deciliter  morphine  - Injectable 2 milliGRAM(s) IV Push every 4 hours PRN Severe Pain (7 - 10)  oxyCODONE    5 mG/acetaminophen 325 mG 1 Tablet(s) Oral every 4 hours PRN Moderate Pain (4 - 6)      RADIOLOGY & ADDITIONAL TESTS:

## 2018-04-07 NOTE — PROGRESS NOTE ADULT - SUBJECTIVE AND OBJECTIVE BOX
ANTOINETTE WILSON    [id number]    68yo  AA       Male    INTERVAL HPI /OVERNIGHT EVENTS:    Patient is being seen for bilateral  HDN - Chronic , UTI & CKD - 4,     Refusing  PC  transfusions.     REVIEW OF SYSTEMS:    CONSTITUTIONAL: No fever, weight loss, or fatigue  RESPIRATORY: No cough, wheezing, hemoptysis; No shortness of breath  CARDIOVASCULAR: No chest pain, palpitations  GASTROINTESTINAL: No abdominal or epigastric pain. No nausea, vomiting  NEUROLOGICAL: No headaches, memory loss, loss of strength.  MISCELLANEOUS: Blind,      Vital Signs Last 48  Hrs,    T(C): 36.4 (2018 23:21), Max: 36.7 (2018 20:00)  T(F): 97.5 (2018 23:21), Max: 98 (2018 20:00)  HR: 88 (2018 23:21) (82 - 88)  BP: 105/60 (2018 23:21) (105/60 - 135/79)  BP(mean): --  RR: 19 (2018 23:21) (18 - 19)  SpO2: 100% (2018 23:21) (100% - 100%)      T(C): 36.7 (2018 08:06), Max: 37.3 (2018 15:09)  T(F): 98.1 (2018 08:06), Max: 99.1 (2018 15:09)  HR: 88 (2018 08:06) (88 - 100)  BP: 142/63 (2018 08:06) (119/- - 148/87)  BP(mean): 61 (2018 15:09) (61 - 61)  RR: 18 (2018 08:06) (18 - 18)  SpO2: 97% (2018 08:06) (97% - 100%)    PHYSICAL EXAM:    	GENERAL: Pt lying comfortably, NAD.  	NECK: soft, Supple, No JVD,   	CHEST/LUNG: Clear to auscultation bilaterally; No wheezing.  	HEART: S1S2+, Regular rate and rhythm; No murmurs.  	ABDOMEN: Soft, Nontender, Nondistended; Bowel sounds present.  	MUSCULOSKELETAL: Normal range of motion.  	SKIN: No rashes or lesions. Dryness on LE  	NEURO: AAOX3, no focal deficits,    PSYCH: normal mood.    LABS:    141    |  105    |  38.0<H>  ----------------------------<  150<H>  Ca:8.2<L> (2018 07:02)  4.1     |  24.0   |  2.49<H>                       6.2<LL>  8.6   )-----------( 223      ( 2018 07:02 )             20.4<LL>    Phos:-- M.9 mg/dL PTH:-- Uric acid:-- Serum Osm:--  Ferritin:-- Iron:-- TIBC:-- Tsat:--  B12:-- TSH:-- ( @ 07:02)    Urinalysis Basic - ( 2018 21:28 )  Color: Red<!> / Appearance: Cloudy<!> / S.005<L> / pH: x  Gluc: x / Ketone: Trace<!>  / Bili: Negative / Urobili: Negative mg/dL   Blood: x / Protein: 500 mg/dL<!> / Nitrite: Negative   Leuk Esterase: Moderate<!> / RBC: >50 /HPF<!> / WBC 6-10<!>   Sq Epi: x / Non Sq Epi: Occasional / Bacteria: Occasional<!>      UProt:-- UCr:50 mg/dL P/C Ratio:7.9 Ratio 24 hour Prot:-- UVol:-- CrCl:--  Jesse:58 mmol/L UOsm:408 mosm/kg UVol:-- [facility]:-- [place]:-- ( @ 21:28)      MEDICATIONS  (STANDING):  atorvastatin 20 milliGRAM(s) Oral at bedtime  dextrose 5%. 1000 milliLiter(s) (50 mL/Hr) IV Continuous <Continuous>  dextrose 50% Injectable 12.5 Gram(s) IV Push once  dextrose 50% Injectable 25 Gram(s) IV Push once  dextrose 50% Injectable 25 Gram(s) IV Push once  epoetin daniel Injectable 66423 Unit(s) SubCutaneous daily  folic acid 1 milliGRAM(s) Oral daily  insulin lispro (HumaLOG) corrective regimen sliding scale   SubCutaneous three times a day before meals  iron sucrose IVPB 200 milliGRAM(s) IV Intermittent every 24 hours  polyethylene glycol 3350 17 Gram(s) Oral daily  sodium bicarbonate  Infusion 0.172 mEq/kG/Hr (125 mL/Hr) IV Continuous <Continuous>  tamsulosin 0.8 milliGRAM(s) Oral at bedtime    MEDICATIONS  (PRN):  acetaminophen   Tablet 650 milliGRAM(s) Oral every 6 hours PRN For Temp greater than 38 C (100.4 F)  acetaminophen   Tablet. 650 milliGRAM(s) Oral every 6 hours PRN Mild Pain (1 - 3)  dextrose Gel 1 Dose(s) Oral once PRN Blood Glucose LESS THAN 70 milliGRAM(s)/deciliter  glucagon  Injectable 1 milliGRAM(s) IntraMuscular once PRN Glucose LESS THAN 70 milligrams/deciliter  morphine  - Injectable 2 milliGRAM(s) IV Push every 4 hours PRN Severe Pain (7 - 10)  oxyCODONE    5 mG/acetaminophen 325 mG 1 Tablet(s) Oral every 4 hours PRN Moderate Pain (4 - 6)      RADIOLOGY & ADDITIONAL TESTS:    INTERPRETATION:  CLINICAL HISTORY: Hydronephrosis documented on 4/3/2018     Follow -up sonogram.     TECHNIQUE: Sonogram of the kidneys and bladder was performed.     COMPARISON: 4/3/2018    FINDINGS: The right kidney is normal in echogenicity and size, measuring   11.5 cm in longitudinal dimension. There is persistent moderate right   renal hydronephrosis. No cortical medullary mass or calculus seen.     The left kidney is normal in echogenicity and size, measuring 12.3 cm in   longitudinal dimension. There is persistent moderate right renal   hydronephrosis. No cortical medullary mass or calculus seen.     The bladder is  contracted surrounding indwelling Warren catheter.      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  -urine culture --> negative  --> completed iv abx    Microcytic anemia likely chronic:  - iron, FOBT  negative  - ABSOLUTELY refusing transfusions   --> epo, c,w iv iron       DM Management - Per ,

## 2018-04-08 LAB
ALBUMIN SERPL ELPH-MCNC: 3 G/DL — LOW (ref 3.3–5.2)
ANION GAP SERPL CALC-SCNC: 8 MMOL/L — SIGNIFICANT CHANGE UP (ref 5–17)
BUN SERPL-MCNC: 24 MG/DL — HIGH (ref 8–20)
CALCIUM SERPL-MCNC: 8.1 MG/DL — LOW (ref 8.6–10.2)
CHLORIDE SERPL-SCNC: 99 MMOL/L — SIGNIFICANT CHANGE UP (ref 98–107)
CO2 SERPL-SCNC: 35 MMOL/L — HIGH (ref 22–29)
CREAT SERPL-MCNC: 1.9 MG/DL — HIGH (ref 0.5–1.3)
GLUCOSE BLDC GLUCOMTR-MCNC: 156 MG/DL — HIGH (ref 70–99)
GLUCOSE BLDC GLUCOMTR-MCNC: 213 MG/DL — HIGH (ref 70–99)
GLUCOSE BLDC GLUCOMTR-MCNC: 215 MG/DL — HIGH (ref 70–99)
GLUCOSE SERPL-MCNC: 147 MG/DL — HIGH (ref 70–115)
HCT VFR BLD CALC: 20.2 % — CRITICAL LOW (ref 42–52)
HGB BLD-MCNC: 6.1 G/DL — CRITICAL LOW (ref 14–18)
MCHC RBC-ENTMCNC: 20.5 PG — LOW (ref 27–31)
MCHC RBC-ENTMCNC: 30.2 G/DL — LOW (ref 32–36)
MCV RBC AUTO: 68 FL — LOW (ref 80–94)
PHOSPHATE SERPL-MCNC: 2.2 MG/DL — LOW (ref 2.4–4.7)
PLATELET # BLD AUTO: 233 K/UL — SIGNIFICANT CHANGE UP (ref 150–400)
POTASSIUM SERPL-MCNC: 3.6 MMOL/L — SIGNIFICANT CHANGE UP (ref 3.5–5.3)
POTASSIUM SERPL-SCNC: 3.6 MMOL/L — SIGNIFICANT CHANGE UP (ref 3.5–5.3)
RBC # BLD: 2.97 M/UL — LOW (ref 4.6–6.2)
RBC # FLD: 19.5 % — HIGH (ref 11–15.6)
SODIUM SERPL-SCNC: 142 MMOL/L — SIGNIFICANT CHANGE UP (ref 135–145)
WBC # BLD: 7.5 K/UL — SIGNIFICANT CHANGE UP (ref 4.8–10.8)
WBC # FLD AUTO: 7.5 K/UL — SIGNIFICANT CHANGE UP (ref 4.8–10.8)

## 2018-04-08 PROCEDURE — 99233 SBSQ HOSP IP/OBS HIGH 50: CPT

## 2018-04-08 RX ORDER — DEXTROSE MONOHYDRATE, SODIUM CHLORIDE, AND POTASSIUM CHLORIDE 50; .745; 4.5 G/1000ML; G/1000ML; G/1000ML
1000 INJECTION, SOLUTION INTRAVENOUS
Qty: 0 | Refills: 0 | Status: DISCONTINUED | OUTPATIENT
Start: 2018-04-08 | End: 2018-04-12

## 2018-04-08 RX ADMIN — POLYETHYLENE GLYCOL 3350 17 GRAM(S): 17 POWDER, FOR SOLUTION ORAL at 12:46

## 2018-04-08 RX ADMIN — Medication 125 MEQ/KG/HR: at 06:15

## 2018-04-08 RX ADMIN — LIDOCAINE 1 APPLICATION(S): 4 CREAM TOPICAL at 18:00

## 2018-04-08 RX ADMIN — LIDOCAINE 1 APPLICATION(S): 4 CREAM TOPICAL at 06:38

## 2018-04-08 RX ADMIN — Medication 2: at 12:46

## 2018-04-08 RX ADMIN — DEXTROSE MONOHYDRATE, SODIUM CHLORIDE, AND POTASSIUM CHLORIDE 65 MILLILITER(S): 50; .745; 4.5 INJECTION, SOLUTION INTRAVENOUS at 17:40

## 2018-04-08 RX ADMIN — Medication 1: at 09:17

## 2018-04-08 RX ADMIN — ATORVASTATIN CALCIUM 20 MILLIGRAM(S): 80 TABLET, FILM COATED ORAL at 21:39

## 2018-04-08 RX ADMIN — Medication 1 MILLIGRAM(S): at 12:46

## 2018-04-08 RX ADMIN — Medication 2: at 17:21

## 2018-04-08 RX ADMIN — TAMSULOSIN HYDROCHLORIDE 0.8 MILLIGRAM(S): 0.4 CAPSULE ORAL at 21:39

## 2018-04-08 NOTE — PROGRESS NOTE ADULT - SUBJECTIVE AND OBJECTIVE BOX
ANTOINETTE WILSON    [id number]    66yo  AA       Male    INTERVAL HPI /OVERNIGHT EVENTS:    Patient is being seen for bilateral  HDN - Chronic , UTI & CKD - 4,     Refusing  PC  transfusions.     REVIEW OF SYSTEMS:    CONSTITUTIONAL: No fever, weight loss, or fatigue  RESPIRATORY: No cough, wheezing, hemoptysis; No shortness of breath  CARDIOVASCULAR: No chest pain, palpitations  GASTROINTESTINAL: No abdominal or epigastric pain. No nausea, vomiting  NEUROLOGICAL: No headaches, memory loss, loss of strength.  MISCELLANEOUS: Blind,    Vital Signs Last 48  Hrs,    T(C): 36.4 (2018 07:44), Max: 37.2 (2018 00:33)  T(F): 97.6 (2018 07:44), Max: 99 (2018 00:33)  HR: 91 (2018 07:44) (91 - 100)  BP: 128/66 (2018 07:44) (102/58 - 128/67)  BP(mean): --  RR: 18 (2018 07:44) (17 - 18)  SpO2: 94% (2018 07:44) (94% - 98%)    T(C): 36.4 (2018 23:21), Max: 36.7 (2018 20:00)  T(F): 97.5 (2018 23:21), Max: 98 (2018 20:00)  HR: 88 (2018 23:21) (82 - 88)  BP: 105/60 (2018 23:21) (105/60 - 135/79)  BP(mean): --  RR: 19 (2018 23:21) (18 - 19)  SpO2: 100% (2018 23:21) (100% - 100%)    PHYSICAL EXAM:    	GENERAL: Pt lying comfortably, NAD.  	NECK: soft, Supple, No JVD,   	CHEST/LUNG: Clear to auscultation bilaterally; No wheezing.  	HEART: S1S2+, Regular rate and rhythm; No murmurs.  	ABDOMEN: Soft, Nontender, Nondistended; Bowel sounds present.  	MUSCULOSKELETAL: Normal range of motion.  	SKIN: No rashes or lesions. Dryness on LE  	NEURO: AAOX3, no focal deficits,    PSYCH: normal mood.    LABS:    142    |  99     |  24.0<H>  ----------------------------<  147<H>  Ca:8.1<L> (2018 10:43)  3.6     |  35.0<H>  |  1.90<H>    eGFR if : 41 <L>    TPro  x      /  Alb  3.0<L>  /  TBili  x      /  DBili  x      /  AST  x      /  ALT  x      /  AlkPhos  x      2018 10:43                         6.1<LL>  7.5   )-----------( 233      ( 2018 06:26 )             20.2<LL>    Phos: 2.2 mg/dL<L> Mg:-- PTH:-- Uric acid:-- Serum Osm:--  Ferritin:-- Iron:-- TIBC:-- Tsat:--  B12:-- TSH:-- ( @ 10:43)    Urinalysis Basic - ( 2018 21:28 )  Color: Red<!> / Appearance: Cloudy<!> / S.005<L> / pH: x  Gluc: x / Ketone: Trace<!>  / Bili: Negative / Urobili: Negative mg/dL   Blood: x / Protein: 500 mg/dL<!> / Nitrite: Negative   Leuk Esterase: Moderate<!> / RBC: >50 /HPF<!> / WBC 6-10<!>   Sq Epi: x / Non Sq Epi: Occasional / Bacteria: Occasional<!>      UProt:-- UCr:50 mg/dL P/C Ratio:7.9 Ratio 24 hour Prot:-- UVol:-- CrCl:--  Jesse:58 mmol/L UOsm:408 mosm/kg UVol:-- UCl:-- [place]:-- ( @ 21:28)      141    |  105    |  38.0<H>  ----------------------------<  150<H>  Ca:8.2<L> (2018 07:02)  4.1     |  24.0   |  2.49<H>                       6.2<LL>  8.6   )-----------( 223      ( 2018 07:02 )             20.4<LL>    Phos:-- M.9 mg/dL PTH:-- Uric acid:-- Serum Osm:--  Ferritin:-- Iron:-- TIBC:-- Tsat:--  B12:-- TSH:-- ( @ 07:02)    Urinalysis Basic - ( 2018 21:28 )  Color: Red<!> / Appearance: Cloudy<!> / S.005<L> / pH: x  Gluc: x / Ketone: Trace<!>  / Bili: Negative / Urobili: Negative mg/dL   Blood: x / Protein: 500 mg/dL<!> / Nitrite: Negative   Leuk Esterase: Moderate<!> / RBC: >50 /HPF<!> / WBC 6-10<!>   Sq Epi: x / Non Sq Epi: Occasional / Bacteria: Occasional<!>      UProt:-- UCr:50 mg/dL P/C Ratio:7.9 Ratio 24 hour Prot:-- UVol:-- CrCl:--  Jesse:58 mmol/L UOsm:408 mosm/kg UVol:-- UCl:-- [place]:-- ( @ 21:28)      MEDICATIONS  (STANDING):  atorvastatin 20 milliGRAM(s) Oral at bedtime  dextrose 5%. 1000 milliLiter(s) (50 mL/Hr) IV Continuous <Continuous>  dextrose 50% Injectable 12.5 Gram(s) IV Push once  dextrose 50% Injectable 25 Gram(s) IV Push once  dextrose 50% Injectable 25 Gram(s) IV Push once  epoetin daniel Injectable 34274 Unit(s) SubCutaneous daily  folic acid 1 milliGRAM(s) Oral daily  insulin lispro (HumaLOG) corrective regimen sliding scale   SubCutaneous three times a day before meals  iron sucrose IVPB 200 milliGRAM(s) IV Intermittent every 24 hours  polyethylene glycol 3350 17 Gram(s) Oral daily  sodium bicarbonate  Infusion 0.172 mEq/kG/Hr (125 mL/Hr) IV Continuous <Continuous>  tamsulosin 0.8 milliGRAM(s) Oral at bedtime    MEDICATIONS  (PRN):  acetaminophen   Tablet 650 milliGRAM(s) Oral every 6 hours PRN For Temp greater than 38 C (100.4 F)  acetaminophen   Tablet. 650 milliGRAM(s) Oral every 6 hours PRN Mild Pain (1 - 3)  dextrose Gel 1 Dose(s) Oral once PRN Blood Glucose LESS THAN 70 milliGRAM(s)/deciliter  glucagon  Injectable 1 milliGRAM(s) IntraMuscular once PRN Glucose LESS THAN 70 milligrams/deciliter  morphine  - Injectable 2 milliGRAM(s) IV Push every 4 hours PRN Severe Pain (7 - 10)  oxyCODONE    5 mG/acetaminophen 325 mG 1 Tablet(s) Oral every 4 hours PRN Moderate Pain (4 - 6)      RADIOLOGY & ADDITIONAL TESTS:    INTERPRETATION:  CLINICAL HISTORY: Hydronephrosis documented on 4/3/2018     Follow -up sonogram.     TECHNIQUE: Sonogram of the kidneys and bladder was performed.     COMPARISON: 4/3/2018    FINDINGS: The right kidney is normal in echogenicity and size, measuring   11.5 cm in longitudinal dimension. There is persistent moderate right   renal hydronephrosis. No cortical medullary mass or calculus seen.     The left kidney is normal in echogenicity and size, measuring 12.3 cm in   longitudinal dimension. There is persistent moderate right renal   hydronephrosis. No cortical medullary mass or calculus seen.     The bladder is  contracted surrounding indwelling Warren catheter.      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  -urine culture --> negative  --> completed iv abx    Microcytic anemia likely chronic:  - iron, FOBT  negative  - ABSOLUTELY refusing transfusions   --> epo, c,w iv iron       DM Management - Per ,    Assessment and Plan:     1.DMN, CKD - 3 ( Optimized )    2.Profound Fe - Deficiency anemia    3.HARLEEN - Bilateral Obstructive uropathy ( Chronic )    4.CKD - Stage - 3     S ; Chronic Warren ,     DM Control, JULIETA, Statins,    Maintain IV Hydration, D.C HCO3,

## 2018-04-08 NOTE — PROGRESS NOTE ADULT - SUBJECTIVE AND OBJECTIVE BOX
ANTOINETTE WILSON    [id number]    67y      Male    INTERVAL HPI/OVERNIGHT EVENTS:    patient being seen for bilateral hydro, uti, ckd and med management. Patient seen at bedside and states pain is improved.     Patient still refusing transfusions     REVIEW OF SYSTEMS:    CONSTITUTIONAL: No fever, weight loss, or fatigue  RESPIRATORY: No cough, wheezing, hemoptysis; No shortness of breath  CARDIOVASCULAR: No chest pain, palpitations  GASTROINTESTINAL: No abdominal or epigastric pain. No nausea, vomiting  NEUROLOGICAL: No headaches, memory loss, loss of strength.  MISCELLANEOUS:      Vital Signs Last 24 Hrs  T(C): 36.4 (2018 07:44), Max: 37.2 (2018 00:33)  T(F): 97.6 (2018 07:44), Max: 99 (2018 00:33)  HR: 91 (2018 07:44) (91 - 100)  BP: 128/66 (2018 07:44) (102/58 - 128/67)  BP(mean): --  RR: 18 (2018 07:44) (17 - 18)  SpO2: 94% (2018 07:44) (94% - 98%)    PHYSICAL EXAM:    	GENERAL: Pt lying comfortably, NAD.  	NECK: soft, Supple, No JVD,   	CHEST/LUNG: Clear to auscultation bilaterally; No wheezing.  	HEART: S1S2+, Regular rate and rhythm; No murmurs.  	ABDOMEN: Soft, Nontender, Nondistended; Bowel sounds present.  	MUSCULOSKELETAL: Normal range of motion.  	SKIN: No rashes or lesions. Dryness on LE  	NEURO: AAOX3, no focal deficits,  PSYCH: normal mood.      LABS:                        6.1    7.5   )-----------( 233      ( 2018 06:26 )             20.2             Urinalysis Basic - ( 2018 21:28 )    Color: Red / Appearance: Cloudy / S.005 / pH: x  Gluc: x / Ketone: Trace  / Bili: Negative / Urobili: Negative mg/dL   Blood: x / Protein: 500 mg/dL / Nitrite: Negative   Leuk Esterase: Moderate / RBC: >50 /HPF / WBC 6-10   Sq Epi: x / Non Sq Epi: Occasional / Bacteria: Occasional          MEDICATIONS  (STANDING):  atorvastatin 20 milliGRAM(s) Oral at bedtime  dextrose 5%. 1000 milliLiter(s) (50 mL/Hr) IV Continuous <Continuous>  dextrose 50% Injectable 12.5 Gram(s) IV Push once  dextrose 50% Injectable 25 Gram(s) IV Push once  dextrose 50% Injectable 25 Gram(s) IV Push once  folic acid 1 milliGRAM(s) Oral daily  insulin lispro (HumaLOG) corrective regimen sliding scale   SubCutaneous three times a day before meals  lidocaine 2% Gel 1 Application(s) Topical every 12 hours  polyethylene glycol 3350 17 Gram(s) Oral daily  sodium bicarbonate  Infusion 0.172 mEq/kG/Hr (125 mL/Hr) IV Continuous <Continuous>  tamsulosin 0.8 milliGRAM(s) Oral at bedtime    MEDICATIONS  (PRN):  acetaminophen   Tablet 650 milliGRAM(s) Oral every 6 hours PRN For Temp greater than 38 C (100.4 F)  acetaminophen   Tablet. 650 milliGRAM(s) Oral every 6 hours PRN Mild Pain (1 - 3)  dextrose Gel 1 Dose(s) Oral once PRN Blood Glucose LESS THAN 70 milliGRAM(s)/deciliter  glucagon  Injectable 1 milliGRAM(s) IntraMuscular once PRN Glucose LESS THAN 70 milligrams/deciliter  morphine  - Injectable 2 milliGRAM(s) IV Push every 4 hours PRN Severe Pain (7 - 10)  oxyCODONE    5 mG/acetaminophen 325 mG 1 Tablet(s) Oral every 4 hours PRN Moderate Pain (4 - 6)      RADIOLOGY & ADDITIONAL TESTS:

## 2018-04-09 LAB
GLUCOSE BLDC GLUCOMTR-MCNC: 122 MG/DL — HIGH (ref 70–99)
GLUCOSE BLDC GLUCOMTR-MCNC: 160 MG/DL — HIGH (ref 70–99)
GLUCOSE BLDC GLUCOMTR-MCNC: 161 MG/DL — HIGH (ref 70–99)
GLUCOSE BLDC GLUCOMTR-MCNC: 167 MG/DL — HIGH (ref 70–99)

## 2018-04-09 PROCEDURE — 99233 SBSQ HOSP IP/OBS HIGH 50: CPT

## 2018-04-09 RX ADMIN — LIDOCAINE 1 APPLICATION(S): 4 CREAM TOPICAL at 17:13

## 2018-04-09 RX ADMIN — LIDOCAINE 1 APPLICATION(S): 4 CREAM TOPICAL at 06:21

## 2018-04-09 RX ADMIN — Medication 1 MILLIGRAM(S): at 11:27

## 2018-04-09 RX ADMIN — Medication 1: at 11:27

## 2018-04-09 RX ADMIN — Medication 1: at 17:13

## 2018-04-09 RX ADMIN — DEXTROSE MONOHYDRATE, SODIUM CHLORIDE, AND POTASSIUM CHLORIDE 65 MILLILITER(S): 50; .745; 4.5 INJECTION, SOLUTION INTRAVENOUS at 11:28

## 2018-04-09 RX ADMIN — ATORVASTATIN CALCIUM 20 MILLIGRAM(S): 80 TABLET, FILM COATED ORAL at 21:18

## 2018-04-09 RX ADMIN — TAMSULOSIN HYDROCHLORIDE 0.8 MILLIGRAM(S): 0.4 CAPSULE ORAL at 21:18

## 2018-04-09 RX ADMIN — POLYETHYLENE GLYCOL 3350 17 GRAM(S): 17 POWDER, FOR SOLUTION ORAL at 11:27

## 2018-04-09 NOTE — PROGRESS NOTE ADULT - SUBJECTIVE AND OBJECTIVE BOX
CC: Vomiting/ARF/Anemia    INTERVAL HPI/OVERNIGHT EVENTS: Patient seen and examined. No acute events overnight. Warren remains in place, urine clearing. Denies chest pain, SOB, dizziness, lightheadedness, nausea, vomiting, fever, chills. Still refusing blood transfusion     Vital Signs Last 24 Hrs  T(C): 37 (09 Apr 2018 07:55), Max: 37.2 (09 Apr 2018 01:08)  T(F): 98.6 (09 Apr 2018 07:55), Max: 99 (09 Apr 2018 01:08)  HR: 91 (09 Apr 2018 07:55) (88 - 93)  BP: 15/69 (09 Apr 2018 07:55) (15/69 - 124/69)  BP(mean): --  RR: 18 (09 Apr 2018 07:55) (18 - 20)  SpO2: 96% (09 Apr 2018 07:55) (95% - 98%)    PHYSICAL EXAM:    	GENERAL:  NAD.  	NECK: soft, Supple, No JVD,   	CHEST/LUNG: Clear to auscultation bilaterally; No wheezing.  	HEART: S1S2+, Regular rate and rhythm; No murmurs.  	ABDOMEN: Soft, Nontender, Nondistended; Bowel sounds present.  	MUSCULOSKELETAL: Normal range of motion.  	SKIN: No rashes or lesions.  	NEURO: AAOX3, no focal deficits,              PSYCH: normal mood.    I&O's Detail    08 Apr 2018 07:01  -  09 Apr 2018 07:00  --------------------------------------------------------  IN:  Total IN: 0 mL    OUT:    Indwelling Catheter - Urethral: 1400 mL  Total OUT: 1400 mL    Total NET: -1400 mL                                    6.1    7.5   )-----------( 233      ( 08 Apr 2018 06:26 )             20.2     08 Apr 2018 10:43    142    |  99     |  24.0   ----------------------------<  147    3.6     |  35.0   |  1.90     Ca    8.1        08 Apr 2018 10:43  Phos  2.2       08 Apr 2018 10:43    TPro  x      /  Alb  3.0    /  TBili  x      /  DBili  x      /  AST  x      /  ALT  x      /  AlkPhos  x      08 Apr 2018 10:43      CAPILLARY BLOOD GLUCOSE      POCT Blood Glucose.: 160 mg/dL (09 Apr 2018 11:03)  POCT Blood Glucose.: 122 mg/dL (09 Apr 2018 07:49)  POCT Blood Glucose.: 215 mg/dL (08 Apr 2018 17:10)  POCT Blood Glucose.: 213 mg/dL (08 Apr 2018 12:17)    LIVER FUNCTIONS - ( 08 Apr 2018 10:43 )  Alb: 3.0 g/dL / Pro: x     / ALK PHOS: x     / ALT: x     / AST: x     / GGT: x             Hemoglobin A1C, Whole Blood: 7.3 % (04-04-18 @ 07:17)    MEDICATIONS  (STANDING):  atorvastatin 20 milliGRAM(s) Oral at bedtime  dextrose 5%. 1000 milliLiter(s) (50 mL/Hr) IV Continuous <Continuous>  dextrose 50% Injectable 12.5 Gram(s) IV Push once  dextrose 50% Injectable 25 Gram(s) IV Push once  dextrose 50% Injectable 25 Gram(s) IV Push once  folic acid 1 milliGRAM(s) Oral daily  insulin lispro (HumaLOG) corrective regimen sliding scale   SubCutaneous three times a day before meals  lidocaine 2% Gel 1 Application(s) Topical every 12 hours  polyethylene glycol 3350 17 Gram(s) Oral daily  sodium chloride 0.45% with potassium chloride 20 mEq/L 1000 milliLiter(s) (65 mL/Hr) IV Continuous <Continuous>  tamsulosin 0.8 milliGRAM(s) Oral at bedtime    MEDICATIONS  (PRN):  acetaminophen   Tablet 650 milliGRAM(s) Oral every 6 hours PRN For Temp greater than 38 C (100.4 F)  acetaminophen   Tablet. 650 milliGRAM(s) Oral every 6 hours PRN Mild Pain (1 - 3)  dextrose Gel 1 Dose(s) Oral once PRN Blood Glucose LESS THAN 70 milliGRAM(s)/deciliter  glucagon  Injectable 1 milliGRAM(s) IntraMuscular once PRN Glucose LESS THAN 70 milligrams/deciliter  morphine  - Injectable 2 milliGRAM(s) IV Push every 4 hours PRN Severe Pain (7 - 10)  oxyCODONE    5 mG/acetaminophen 325 mG 1 Tablet(s) Oral every 4 hours PRN Moderate Pain (4 - 6)      RADIOLOGY & ADDITIONAL TESTS:

## 2018-04-09 NOTE — PROGRESS NOTE ADULT - SUBJECTIVE AND OBJECTIVE BOX
[facility] DIVISION OF KIDNEY DISEASES AND HYPERTENSION -- FOLLOW UP NOTE  --------------------------------------------------------------------------------  Chief Complaint:    24 hour events/subjective:        PAST HISTORY  --------------------------------------------------------------------------------  No significant changes to PMH, PSH, FHx, SHx, unless otherwise noted    ALLERGIES & MEDICATIONS  --------------------------------------------------------------------------------  Allergies    amoxicillin (Rash)    Intolerances      Standing Inpatient Medications  atorvastatin 20 milliGRAM(s) Oral at bedtime  dextrose 5%. 1000 milliLiter(s) IV Continuous <Continuous>  dextrose 50% Injectable 12.5 Gram(s) IV Push once  dextrose 50% Injectable 25 Gram(s) IV Push once  dextrose 50% Injectable 25 Gram(s) IV Push once  folic acid 1 milliGRAM(s) Oral daily  insulin lispro (HumaLOG) corrective regimen sliding scale   SubCutaneous three times a day before meals  lidocaine 2% Gel 1 Application(s) Topical every 12 hours  polyethylene glycol 3350 17 Gram(s) Oral daily  sodium chloride 0.45% with potassium chloride 20 mEq/L 1000 milliLiter(s) IV Continuous <Continuous>  tamsulosin 0.8 milliGRAM(s) Oral at bedtime    PRN Inpatient Medications  acetaminophen   Tablet 650 milliGRAM(s) Oral every 6 hours PRN  acetaminophen   Tablet. 650 milliGRAM(s) Oral every 6 hours PRN  dextrose Gel 1 Dose(s) Oral once PRN  glucagon  Injectable 1 milliGRAM(s) IntraMuscular once PRN  morphine  - Injectable 2 milliGRAM(s) IV Push every 4 hours PRN  oxyCODONE    5 mG/acetaminophen 325 mG 1 Tablet(s) Oral every 4 hours PRN      REVIEW OF SYSTEMS  --------------------------------------------------------------------------------  Gen: No weight changes, fatigue, fevers/chills, weakness  Skin: No rashes  Head/Eyes/Ears/Mouth: No headache; Normal hearing; Normal vision w/o blurriness; No sinus pain/discomfort, sore throat  Respiratory: No dyspnea, cough, wheezing, hemoptysis  CV: No chest pain, PND, orthopnea  GI: No abdominal pain, diarrhea, constipation, nausea, vomiting, melena, hematochezia  : No increased frequency, dysuria, hematuria, nocturia  MSK: No joint pain/swelling; no back pain; no edema  Neuro: No dizziness/lightheadedness, weakness, seizures, numbness, tingling  Heme: No easy bruising or bleeding  Endo: No heat/cold intolerance  Psych: No significant nervousness, anxiety, stress, depression    All other systems were reviewed and are negative, except as noted.    VITALS/PHYSICAL EXAM  --------------------------------------------------------------------------------  T(C): 37 (04-09-18 @ 07:55), Max: 37.2 (04-09-18 @ 01:08)  HR: 91 (04-09-18 @ 07:55) (88 - 93)  BP: 15/69 (04-09-18 @ 07:55) (15/69 - 124/69)  RR: 18 (04-09-18 @ 07:55) (18 - 20)  SpO2: 96% (04-09-18 @ 07:55) (95% - 98%)  Wt(kg): --        04-08-18 @ 07:01  -  04-09-18 @ 07:00  --------------------------------------------------------  IN: 0 mL / OUT: 1400 mL / NET: -1400 mL      Physical Exam:  	Gen: NAD, well-appearing  	HEENT: PERRL, supple neck, clear oropharynx  	Pulm: CTA B/L  	CV: RRR, S1S2; no rub  	Back: No spinal or CVA tenderness; no sacral edema  	Abd: +BS, soft, nontender/nondistended  	: No suprapubic tenderness  	UE: Warm, FROM, no clubbing, intact strength; no edema; no asterixis  	LE: Warm, FROM, no clubbing, intact strength; no edema  	Neuro: No focal deficits, intact gait  	Psych: Normal affect and mood  	Skin: Warm, without rashes  	Vascular access:    LABS/STUDIES  --------------------------------------------------------------------------------              6.1    7.5   >-----------<  233      [04-08-18 @ 06:26]              20.2     142  |  99  |  24.0  ----------------------------<  147      [04-08-18 @ 10:43]  3.6   |  35.0  |  1.90        Ca     8.1     [04-08-18 @ 10:43]      Phos  2.2     [04-08-18 @ 10:43]    TPro  x   /  Alb  3.0  /  TBili  x   /  DBili  x   /  AST  x   /  ALT  x   /  AlkPhos  x   [04-08-18 @ 10:43]          Creatinine Trend:  SCr 1.90 [04-08 @ 10:43]  SCr 2.49 [04-06 @ 07:02]  SCr 2.43 [04-05 @ 07:49]  SCr 2.54 [04-04 @ 07:17]  SCr 2.55 [04-03 @ 04:44]    Urinalysis - [04-06-18 @ 21:28]      Color Red / Appearance Cloudy / SG 1.005 / pH 7.0      Gluc Negative / Ketone Trace  / Bili Negative / Urobili Negative       Blood Large / Protein 500 / Leuk Est Moderate / Nitrite Negative      RBC >50 / WBC 6-10 / Hyaline  / Gran  / Sq Epi  / Non Sq Epi Occasional / Bacteria Occasional    Urine Creatinine 50      [04-06-18 @ 21:28]  Urine Protein 395      [04-06-18 @ 21:28]  Urine Sodium 58      [04-06-18 @ 21:28]  Urine Chloride 31      [04-03-18 @ 08:22]  Urine Osmolality 408      [04-06-18 @ 21:28]    Iron 23, TIBC 466, %sat 5      [04-03-18 @ 11:56]  Ferritin 17      [04-03-18 @ 11:56]  HbA1c 7.3      [04-04-18 @ 07:17]  TSH 3.59      [04-05-18 @ 07:49]  Lipid: chol 124, TG 95, HDL 27, LDL 78      [04-04-18 @ 07:17] [facility] DIVISION OF KIDNEY DISEASES AND HYPERTENSION -- FOLLOW UP NOTE  --------------------------------------------------------------------------------  Chief Complaint: Obstructive uropathy    24 hour events/subjective:  Pt seen and examined  NAD  w/ bourne      PAST HISTORY  --------------------------------------------------------------------------------  No significant changes to PMH, PSH, FHx, SHx, unless otherwise noted    ALLERGIES & MEDICATIONS  --------------------------------------------------------------------------------  Allergies    amoxicillin (Rash)    Intolerances      Standing Inpatient Medications  atorvastatin 20 milliGRAM(s) Oral at bedtime  dextrose 5%. 1000 milliLiter(s) IV Continuous <Continuous>  dextrose 50% Injectable 12.5 Gram(s) IV Push once  dextrose 50% Injectable 25 Gram(s) IV Push once  dextrose 50% Injectable 25 Gram(s) IV Push once  folic acid 1 milliGRAM(s) Oral daily  insulin lispro (HumaLOG) corrective regimen sliding scale   SubCutaneous three times a day before meals  lidocaine 2% Gel 1 Application(s) Topical every 12 hours  polyethylene glycol 3350 17 Gram(s) Oral daily  sodium chloride 0.45% with potassium chloride 20 mEq/L 1000 milliLiter(s) IV Continuous <Continuous>  tamsulosin 0.8 milliGRAM(s) Oral at bedtime    PRN Inpatient Medications  acetaminophen   Tablet 650 milliGRAM(s) Oral every 6 hours PRN  acetaminophen   Tablet. 650 milliGRAM(s) Oral every 6 hours PRN  dextrose Gel 1 Dose(s) Oral once PRN  glucagon  Injectable 1 milliGRAM(s) IntraMuscular once PRN  morphine  - Injectable 2 milliGRAM(s) IV Push every 4 hours PRN  oxyCODONE    5 mG/acetaminophen 325 mG 1 Tablet(s) Oral every 4 hours PRN      REVIEW OF SYSTEMS  --------------------------------------------------------------------------------  Gen: No weight changes, fatigue, fevers/chills, weakness  Skin: No rashes  Head/Eyes/Ears/Mouth: No headache; Normal hearing; Normal vision w/o blurriness; No sinus pain/discomfort, sore throat  Respiratory: No dyspnea, cough, wheezing, hemoptysis  CV: No chest pain, PND, orthopnea  GI: No abdominal pain, diarrhea, constipation, nausea, vomiting, melena, hematochezia  : No increased frequency, dysuria, hematuria, nocturia  MSK: No joint pain/swelling; no back pain; no edema  Neuro: No dizziness/lightheadedness, weakness, seizures, numbness, tingling  Heme: No easy bruising or bleeding  Endo: No heat/cold intolerance  Psych: No significant nervousness, anxiety, stress, depression    All other systems were reviewed and are negative, except as noted.    VITALS/PHYSICAL EXAM  --------------------------------------------------------------------------------  T(C): 37 (04-09-18 @ 07:55), Max: 37.2 (04-09-18 @ 01:08)  HR: 91 (04-09-18 @ 07:55) (88 - 93)  BP: 15/69 (04-09-18 @ 07:55) (15/69 - 124/69)  RR: 18 (04-09-18 @ 07:55) (18 - 20)  SpO2: 96% (04-09-18 @ 07:55) (95% - 98%)  Wt(kg): --        04-08-18 @ 07:01  -  04-09-18 @ 07:00  --------------------------------------------------------  IN: 0 mL / OUT: 1400 mL / NET: -1400 mL      Physical Exam:  	Gen: NAD, chronically ill-appearing  	HEENT: PERRL, supple neck, clear oropharynx  	Pulm: CTA B/L  	CV: RRR, S1S2; no rub  	Back: No spinal or CVA tenderness; no sacral edema  	Abd: +BS, soft, nontender/nondistended  	: + bourne  	UE: Warm, FROM, no clubbing, intact strength; no edema; no asterixis  	LE: Warm, FROM, no clubbing, intact strength; no edema  	Neuro: No focal deficits, intact gait  	Psych: Normal affect and mood  	Skin: Warm, without rashes  	Vascular access:    LABS/STUDIES  --------------------------------------------------------------------------------              6.1    7.5   >-----------<  233      [04-08-18 @ 06:26]              20.2     142  |  99  |  24.0  ----------------------------<  147      [04-08-18 @ 10:43]  3.6   |  35.0  |  1.90        Ca     8.1     [04-08-18 @ 10:43]      Phos  2.2     [04-08-18 @ 10:43]    TPro  x   /  Alb  3.0  /  TBili  x   /  DBili  x   /  AST  x   /  ALT  x   /  AlkPhos  x   [04-08-18 @ 10:43]          Creatinine Trend:  SCr 1.90 [04-08 @ 10:43]  SCr 2.49 [04-06 @ 07:02]  SCr 2.43 [04-05 @ 07:49]  SCr 2.54 [04-04 @ 07:17]  SCr 2.55 [04-03 @ 04:44]    Urinalysis - [04-06-18 @ 21:28]      Color Red / Appearance Cloudy / SG 1.005 / pH 7.0      Gluc Negative / Ketone Trace  / Bili Negative / Urobili Negative       Blood Large / Protein 500 / Leuk Est Moderate / Nitrite Negative      RBC >50 / WBC 6-10 / Hyaline  / Gran  / Sq Epi  / Non Sq Epi Occasional / Bacteria Occasional    Urine Creatinine 50      [04-06-18 @ 21:28]  Urine Protein 395      [04-06-18 @ 21:28]  Urine Sodium 58      [04-06-18 @ 21:28]  Urine Chloride 31      [04-03-18 @ 08:22]  Urine Osmolality 408      [04-06-18 @ 21:28]    Iron 23, TIBC 466, %sat 5      [04-03-18 @ 11:56]  Ferritin 17      [04-03-18 @ 11:56]  HbA1c 7.3      [04-04-18 @ 07:17]  TSH 3.59      [04-05-18 @ 07:49]  Lipid: chol 124, TG 95, HDL 27, LDL 78      [04-04-18 @ 07:17]

## 2018-04-09 NOTE — DIETITIAN INITIAL EVALUATION ADULT. - OTHER INFO
Pt seen at bedside. Pt legally blind. Currently on DASH/TLC diet. Pt states having good appetite and PO intake currently and PTA. Pt denies n/v/c/d. Denies difficulty chewing/swallowing. Pt states not knowing what his weight is and is only knowledgeable of weight \[facility]\"" has provided (240lbs). Pt with history of T2DM. States he was diagnosed in 1998 and has gone to multiple educational courses regarding diabetes and nutrition education. Pt with HbA1C: 7.3%.

## 2018-04-10 LAB
ALBUMIN SERPL ELPH-MCNC: 3.1 G/DL — LOW (ref 3.3–5.2)
ALP SERPL-CCNC: 71 U/L — SIGNIFICANT CHANGE UP (ref 40–120)
ALT FLD-CCNC: 11 U/L — SIGNIFICANT CHANGE UP
ANION GAP SERPL CALC-SCNC: 14 MMOL/L — SIGNIFICANT CHANGE UP (ref 5–17)
AST SERPL-CCNC: 35 U/L — SIGNIFICANT CHANGE UP
BILIRUB SERPL-MCNC: 0.6 MG/DL — SIGNIFICANT CHANGE UP (ref 0.4–2)
BUN SERPL-MCNC: 20 MG/DL — SIGNIFICANT CHANGE UP (ref 8–20)
CALCIUM SERPL-MCNC: 8.3 MG/DL — LOW (ref 8.6–10.2)
CHLORIDE SERPL-SCNC: 100 MMOL/L — SIGNIFICANT CHANGE UP (ref 98–107)
CO2 SERPL-SCNC: 29 MMOL/L — SIGNIFICANT CHANGE UP (ref 22–29)
CREAT SERPL-MCNC: 2.27 MG/DL — HIGH (ref 0.5–1.3)
GLUCOSE BLDC GLUCOMTR-MCNC: 109 MG/DL — HIGH (ref 70–99)
GLUCOSE BLDC GLUCOMTR-MCNC: 141 MG/DL — HIGH (ref 70–99)
GLUCOSE BLDC GLUCOMTR-MCNC: 147 MG/DL — HIGH (ref 70–99)
GLUCOSE BLDC GLUCOMTR-MCNC: 151 MG/DL — HIGH (ref 70–99)
GLUCOSE SERPL-MCNC: 121 MG/DL — HIGH (ref 70–115)
HCT VFR BLD CALC: 21.8 % — LOW (ref 42–52)
HGB BLD-MCNC: 6.5 G/DL — CRITICAL LOW (ref 14–18)
MAGNESIUM SERPL-MCNC: 1.6 MG/DL — SIGNIFICANT CHANGE UP (ref 1.6–2.6)
MCHC RBC-ENTMCNC: 20.8 PG — LOW (ref 27–31)
MCHC RBC-ENTMCNC: 29.8 G/DL — LOW (ref 32–36)
MCV RBC AUTO: 69.9 FL — LOW (ref 80–94)
PHOSPHATE SERPL-MCNC: 2.6 MG/DL — SIGNIFICANT CHANGE UP (ref 2.4–4.7)
PLATELET # BLD AUTO: 228 K/UL — SIGNIFICANT CHANGE UP (ref 150–400)
POTASSIUM SERPL-MCNC: 4.1 MMOL/L — SIGNIFICANT CHANGE UP (ref 3.5–5.3)
POTASSIUM SERPL-SCNC: 4.1 MMOL/L — SIGNIFICANT CHANGE UP (ref 3.5–5.3)
PROT SERPL-MCNC: 6.1 G/DL — LOW (ref 6.6–8.7)
RBC # BLD: 3.12 M/UL — LOW (ref 4.6–6.2)
RBC # FLD: 22.1 % — HIGH (ref 11–15.6)
SODIUM SERPL-SCNC: 143 MMOL/L — SIGNIFICANT CHANGE UP (ref 135–145)
WBC # BLD: 7.4 K/UL — SIGNIFICANT CHANGE UP (ref 4.8–10.8)
WBC # FLD AUTO: 7.4 K/UL — SIGNIFICANT CHANGE UP (ref 4.8–10.8)

## 2018-04-10 PROCEDURE — 99233 SBSQ HOSP IP/OBS HIGH 50: CPT

## 2018-04-10 PROCEDURE — 99232 SBSQ HOSP IP/OBS MODERATE 35: CPT

## 2018-04-10 RX ADMIN — Medication 1 MILLIGRAM(S): at 12:45

## 2018-04-10 RX ADMIN — ATORVASTATIN CALCIUM 20 MILLIGRAM(S): 80 TABLET, FILM COATED ORAL at 21:30

## 2018-04-10 RX ADMIN — LIDOCAINE 1 APPLICATION(S): 4 CREAM TOPICAL at 18:02

## 2018-04-10 RX ADMIN — DEXTROSE MONOHYDRATE, SODIUM CHLORIDE, AND POTASSIUM CHLORIDE 65 MILLILITER(S): 50; .745; 4.5 INJECTION, SOLUTION INTRAVENOUS at 02:58

## 2018-04-10 RX ADMIN — DEXTROSE MONOHYDRATE, SODIUM CHLORIDE, AND POTASSIUM CHLORIDE 65 MILLILITER(S): 50; .745; 4.5 INJECTION, SOLUTION INTRAVENOUS at 23:00

## 2018-04-10 RX ADMIN — POLYETHYLENE GLYCOL 3350 17 GRAM(S): 17 POWDER, FOR SOLUTION ORAL at 12:45

## 2018-04-10 RX ADMIN — TAMSULOSIN HYDROCHLORIDE 0.8 MILLIGRAM(S): 0.4 CAPSULE ORAL at 21:30

## 2018-04-10 RX ADMIN — LIDOCAINE 1 APPLICATION(S): 4 CREAM TOPICAL at 05:45

## 2018-04-10 NOTE — PROGRESS NOTE ADULT - SUBJECTIVE AND OBJECTIVE BOX
Patient seen and examined    I&O's Summary    09 Apr 2018 07:01  -  10 Apr 2018 07:00  --------------------------------------------------------  IN: 845 mL / OUT: 3400 mL / NET: -2555 mL    REVIEW OF SYSTEMS:    CONSTITUTIONAL: No F/C  RESPIRATORY: No cough or SOB  CARDIOVASCULAR: No CP/palpitations,    GASTROINTESTINAL: No abdominal pain , NVD   GENITOURINARY: No UTI sx, + Warren,  NEUROLOGICAL: No headaches/wk/numbness  MUSCULOSKELETAL:  No joint pain/swelling; No LBP  EXTREMITIES : no swelling,    Vital Signs Last 24 Hrs  T(C): 37.5 (10 Apr 2018 09:13), Max: 37.5 (10 Apr 2018 09:13)  T(F): 99.5 (10 Apr 2018 09:13), Max: 99.5 (10 Apr 2018 09:13)  HR: 94 (10 Apr 2018 09:13) (83 - 94)  BP: 119/67 (10 Apr 2018 09:13) (119/67 - 126/70)  BP(mean): --  RR: 18 (10 Apr 2018 09:13) (17 - 18)  SpO2: 95% (10 Apr 2018 09:13) (94% - 100%)    PHYSICAL EXAM:    GENERAL: NAD,   EYES:  conjunctiva and sclera clear  NECK: Supple, No JVD/Bruit  NERVOUS SYSTEM:  A/O x3,   CHEST:  CTA ,No rales or rhonchi  HEART:  RRR, No murmurs  ABDOMEN: Soft, NT/ND BS+  EXTREMITIES:  No Edema;  SKIN: No rashes    LABS:                        6.5    7.4   )-----------( 228      ( 10 Apr 2018 06:33 )             21.8     04-10    143  |  100  |  20.0  ----------------------------<  121<H>  4.1   |  29.0  |  2.27<H>    Ca    8.3<L>      10 Apr 2018 06:33  Phos  2.6     04-10  Mg     1.6     04-10    TPro  6.1<L>  /  Alb  3.1<L>  /  TBili  0.6  /  DBili  x   /  AST  35  /  ALT  11  /  AlkPhos  71  04-10      MEDICATIONS  (STANDING):  acetaminophen   Tablet PRN  acetaminophen   Tablet. PRN  atorvastatin  dextrose 5%.  dextrose 50% Injectable  dextrose 50% Injectable  dextrose 50% Injectable  dextrose Gel PRN  folic acid  glucagon  Injectable PRN  insulin lispro (HumaLOG) corrective regimen sliding scale  lidocaine 2% Gel  morphine  - Injectable PRN  oxyCODONE    5 mG/acetaminophen 325 mG PRN  polyethylene glycol 3350  sodium chloride 0.45% with potassium chloride 20 mEq/L  tamsulosin

## 2018-04-10 NOTE — PROGRESS NOTE ADULT - SUBJECTIVE AND OBJECTIVE BOX
CC: Vomiting/ARF/Anemia    INTERVAL HPI/OVERNIGHT EVENTS: Patient seen and examined. Denies chest pain, SOB, dizziness, lightheadedness, nausea, vomiting, fever, chills    Vital Signs Last 24 Hrs  T(C): 37.5 (10 Apr 2018 09:13), Max: 37.5 (10 Apr 2018 09:13)  T(F): 99.5 (10 Apr 2018 09:13), Max: 99.5 (10 Apr 2018 09:13)  HR: 94 (10 Apr 2018 09:13) (83 - 94)  BP: 119/67 (10 Apr 2018 09:13) (119/67 - 126/70)  BP(mean): --  RR: 18 (10 Apr 2018 09:13) (17 - 18)  SpO2: 95% (10 Apr 2018 09:13) (94% - 100%)    PHYSICAL EXAM:    	GENERAL:  NAD.  	NECK: soft, Supple, No JVD,   	CHEST/LUNG: Clear to auscultation bilaterally; No wheezing.  	HEART: S1S2+, Regular rate and rhythm; No murmurs.  	ABDOMEN: Soft, Nontender, Nondistended; Bowel sounds present.  	MUSCULOSKELETAL: Normal range of motion.  	SKIN: No rashes or lesions.  	NEURO: AAOX3, no focal deficits,              PSYCH: normal mood.    I&O's Detail    09 Apr 2018 07:01  -  10 Apr 2018 07:00  --------------------------------------------------------  IN:    sodium chloride 0.45% with potassium chloride 20 mEq/L: 845 mL  Total IN: 845 mL    OUT:    Indwelling Catheter - Urethral: 3400 mL  Total OUT: 3400 mL    Total NET: -2555 mL                                    6.5    7.4   )-----------( 228      ( 10 Apr 2018 06:33 )             21.8     10 Apr 2018 06:33    143    |  100    |  20.0   ----------------------------<  121    4.1     |  29.0   |  2.27     Ca    8.3        10 Apr 2018 06:33  Phos  2.6       10 Apr 2018 06:33  Mg     1.6       10 Apr 2018 06:33    TPro  6.1    /  Alb  3.1    /  TBili  0.6    /  DBili  x      /  AST  35     /  ALT  11     /  AlkPhos  71     10 Apr 2018 06:33      CAPILLARY BLOOD GLUCOSE      POCT Blood Glucose.: 147 mg/dL (10 Apr 2018 12:43)  POCT Blood Glucose.: 109 mg/dL (10 Apr 2018 07:57)  POCT Blood Glucose.: 167 mg/dL (09 Apr 2018 21:21)  POCT Blood Glucose.: 161 mg/dL (09 Apr 2018 16:15)    LIVER FUNCTIONS - ( 10 Apr 2018 06:33 )  Alb: 3.1 g/dL / Pro: 6.1 g/dL / ALK PHOS: 71 U/L / ALT: 11 U/L / AST: 35 U/L / GGT: x               MEDICATIONS  (STANDING):  atorvastatin 20 milliGRAM(s) Oral at bedtime  dextrose 5%. 1000 milliLiter(s) (50 mL/Hr) IV Continuous <Continuous>  dextrose 50% Injectable 12.5 Gram(s) IV Push once  dextrose 50% Injectable 25 Gram(s) IV Push once  dextrose 50% Injectable 25 Gram(s) IV Push once  folic acid 1 milliGRAM(s) Oral daily  insulin lispro (HumaLOG) corrective regimen sliding scale   SubCutaneous three times a day before meals  lidocaine 2% Gel 1 Application(s) Topical every 12 hours  polyethylene glycol 3350 17 Gram(s) Oral daily  sodium chloride 0.45% with potassium chloride 20 mEq/L 1000 milliLiter(s) (65 mL/Hr) IV Continuous <Continuous>  tamsulosin 0.8 milliGRAM(s) Oral at bedtime    MEDICATIONS  (PRN):  acetaminophen   Tablet 650 milliGRAM(s) Oral every 6 hours PRN For Temp greater than 38 C (100.4 F)  acetaminophen   Tablet. 650 milliGRAM(s) Oral every 6 hours PRN Mild Pain (1 - 3)  dextrose Gel 1 Dose(s) Oral once PRN Blood Glucose LESS THAN 70 milliGRAM(s)/deciliter  glucagon  Injectable 1 milliGRAM(s) IntraMuscular once PRN Glucose LESS THAN 70 milligrams/deciliter  morphine  - Injectable 2 milliGRAM(s) IV Push every 4 hours PRN Severe Pain (7 - 10)  oxyCODONE    5 mG/acetaminophen 325 mG 1 Tablet(s) Oral every 4 hours PRN Moderate Pain (4 - 6)      RADIOLOGY & ADDITIONAL TESTS:

## 2018-04-11 ENCOUNTER — TRANSCRIPTION ENCOUNTER (OUTPATIENT)
Age: 68
End: 2018-04-11

## 2018-04-11 LAB
GLUCOSE BLDC GLUCOMTR-MCNC: 106 MG/DL — HIGH (ref 70–99)
GLUCOSE BLDC GLUCOMTR-MCNC: 136 MG/DL — HIGH (ref 70–99)
GLUCOSE BLDC GLUCOMTR-MCNC: 152 MG/DL — HIGH (ref 70–99)
GLUCOSE BLDC GLUCOMTR-MCNC: 160 MG/DL — HIGH (ref 70–99)

## 2018-04-11 PROCEDURE — 99232 SBSQ HOSP IP/OBS MODERATE 35: CPT

## 2018-04-11 PROCEDURE — 99233 SBSQ HOSP IP/OBS HIGH 50: CPT

## 2018-04-11 RX ORDER — FOLIC ACID 0.8 MG
1 TABLET ORAL
Qty: 30 | Refills: 0 | OUTPATIENT
Start: 2018-04-11 | End: 2018-05-10

## 2018-04-11 RX ORDER — SITAGLIPTIN 50 MG/1
1 TABLET, FILM COATED ORAL
Qty: 30 | Refills: 0 | OUTPATIENT
Start: 2018-04-11 | End: 2018-05-10

## 2018-04-11 RX ORDER — TAMSULOSIN HYDROCHLORIDE 0.4 MG/1
2 CAPSULE ORAL
Qty: 60 | Refills: 0 | OUTPATIENT
Start: 2018-04-11 | End: 2018-05-10

## 2018-04-11 RX ORDER — FERROUS SULFATE 325(65) MG
1 TABLET ORAL
Qty: 90 | Refills: 0 | OUTPATIENT
Start: 2018-04-11 | End: 2018-05-10

## 2018-04-11 RX ORDER — ATORVASTATIN CALCIUM 80 MG/1
1 TABLET, FILM COATED ORAL
Qty: 30 | Refills: 0 | OUTPATIENT
Start: 2018-04-11 | End: 2018-05-10

## 2018-04-11 RX ADMIN — LIDOCAINE 1 APPLICATION(S): 4 CREAM TOPICAL at 06:12

## 2018-04-11 RX ADMIN — Medication 1: at 11:29

## 2018-04-11 RX ADMIN — TAMSULOSIN HYDROCHLORIDE 0.8 MILLIGRAM(S): 0.4 CAPSULE ORAL at 21:42

## 2018-04-11 RX ADMIN — LIDOCAINE 1 APPLICATION(S): 4 CREAM TOPICAL at 17:09

## 2018-04-11 RX ADMIN — DEXTROSE MONOHYDRATE, SODIUM CHLORIDE, AND POTASSIUM CHLORIDE 65 MILLILITER(S): 50; .745; 4.5 INJECTION, SOLUTION INTRAVENOUS at 11:30

## 2018-04-11 RX ADMIN — POLYETHYLENE GLYCOL 3350 17 GRAM(S): 17 POWDER, FOR SOLUTION ORAL at 11:30

## 2018-04-11 RX ADMIN — Medication 1 MILLIGRAM(S): at 11:30

## 2018-04-11 RX ADMIN — ATORVASTATIN CALCIUM 20 MILLIGRAM(S): 80 TABLET, FILM COATED ORAL at 21:42

## 2018-04-11 NOTE — DISCHARGE NOTE ADULT - PROVIDER TOKENS
FREE:[LAST:[pcp],PHONE:[(   )    -],FAX:[(   )    -],ADDRESS:[[facility]]] FREE:[LAST:[pcp],PHONE:[(   )    -],FAX:[(   )    -],ADDRESS:[[facility]]],TOKEN:'[id number]',TOKEN:'[id number]'

## 2018-04-11 NOTE — DISCHARGE NOTE ADULT - PLAN OF CARE
stable, advised to watch salt and to control dm2 follow up with pcp sapphire advised to watch diet follow up with primary had bourne patient requested the removal of bourne and did not want to have it remain  dc with flomax  follow up with urology ABSOLUTELY REFUSED TRANSFUSIONS jessie peoples and bvitamins Warren in place  dc with flomax  follow up with urology for TOV dc with iron and bvitamins

## 2018-04-11 NOTE — DISCHARGE NOTE ADULT - CARE PLAN
Principal Discharge DX:	Acute on chronic renal failure  Secondary Diagnosis:	Diabetes  Secondary Diagnosis:	Stage 4 chronic kidney disease Principal Discharge DX:	Acute on chronic renal failure  Goal:	stable, advised to watch salt and to control dm2  Assessment and plan of treatment:	follow up with pcp  Secondary Diagnosis:	Diabetes  Goal:	januvia  Assessment and plan of treatment:	advised to watch diet  Secondary Diagnosis:	Stage 4 chronic kidney disease  Goal:	follow up with primary  Secondary Diagnosis:	Urinary obstruction  Goal:	had bourne  Assessment and plan of treatment:	patient requested the removal of bourne and did not want to have it remain  jessie with flomax  follow up with urology  Secondary Diagnosis:	Anemia in chronic kidney disease, on chronic dialysis  Goal:	ABSOLUTELY REFUSED TRANSFUSIONS  Assessment and plan of treatment:	jessie peoples and bvitamins Principal Discharge DX:	Acute on chronic renal failure  Goal:	stable, advised to watch salt and to control dm2  Assessment and plan of treatment:	follow up with pcp  Secondary Diagnosis:	Diabetes  Goal:	januvia  Assessment and plan of treatment:	advised to watch diet  Secondary Diagnosis:	Stage 4 chronic kidney disease  Goal:	follow up with primary  Secondary Diagnosis:	Urinary obstruction  Assessment and plan of treatment:	Warren in place  dc with flomax  follow up with urology for TOV  Secondary Diagnosis:	Anemia in chronic kidney disease, on chronic dialysis  Goal:	ABSOLUTELY REFUSED TRANSFUSIONS  Assessment and plan of treatment:	dc with iron and bvitamins

## 2018-04-11 NOTE — DISCHARGE NOTE ADULT - CARE PROVIDERS DIRECT ADDRESSES
,DirectAddress_Unknown ,DirectAddress_Unknown,DirectAddress_Unknown,[email or web address]

## 2018-04-11 NOTE — DISCHARGE NOTE ADULT - HOSPITAL COURSE
Pt is poor historian, obtained form patient son at bedside. Briefly he is 68 y/o male with PMHx of DM-II, legally blind, reported no other PMHx and not on any medicine at home though has not seen any PMD in years, presented to ED with vomiting, X 1 episode. Per son patient had NBNB vomiting episode at home yesterday and hence he was brought into ED. Pt is AOX3, he denied any active complaints, no fever, chills, nausea, headache, dizziness, chest pain, SOB, palpitation, bowel .bladder habits are normal, able to urinate.     patient admitted to medicine and nephro and urology in consult. patient had imaging which showed hydronephrosis and started on flomax and had a bourne placed. Patients creatinine slighlty improved and is now likely on baseline. Hemoglobin a1c elevated.     patients cbc was low as well but refused transfusions and given iv iron, epo and vitamins. Patient is now stable for dc home with daugther. Patient wanted the bourne removed.  Patient also advised moon but refused as well     time spent on dc 32 minutes    high risk of readmission due to non compliance Pt is poor historian, obtained form patient son at bedside. Briefly he is 68 y/o male with PMHx of DM-II, legally blind, reported no other PMHx and not on any medicine at home though has not seen any PMD in years, presented to ED with vomiting, X 1 episode. Per son patient had NBNB vomiting episode at home yesterday and hence he was brought into ED. Pt is AOX3, he denied any active complaints, no fever, chills, nausea, headache, dizziness, chest pain, SOB, palpitation, bowel .bladder habits are normal, able to urinate.     patient admitted to medicine and nephro and urology in consult. patient had imaging which showed hydronephrosis and started on flomax and had a bourne placed. Patients creatinine slighlty improved and is now likely on baseline. Hemoglobin a1c elevated.     patients cbc was low as well but refused transfusions and given iv iron, epo and vitamins. Patient is now stable for dc to rehab. Patient wanted the bourne removed.     time spent on dc 32 minutes    high risk of readmission due to non compliance Pt is poor historian, obtained form patient son at bedside. Briefly he is 66 y/o male with PMHx of DM-II, legally blind, reported no other PMHx and not on any medicine at home though has not seen any PMD in years, presented to ED with vomiting, X 1 episode. Per son patient had NBNB vomiting episode at home yesterday and hence he was brought into ED. Pt is AOX3, he denied any active complaints, no fever, chills, nausea, headache, dizziness, chest pain, SOB, palpitation, bowel .bladder habits are normal, able to urinate.     patient admitted to medicine and nephro and urology in consult. patient had imaging which showed hydronephrosis and started on flomax and had a bourne placed. Patients creatinine slighlty improved and is now likely on baseline. Hemoglobin a1c elevated.     patients cbc was low as well but refused transfusions and given iv iron, epo and vitamins. Patient is now stable for dc to rehab    time spent on dc 32 minutes    high risk of readmission due to non compliance

## 2018-04-11 NOTE — DISCHARGE NOTE ADULT - SECONDARY DIAGNOSIS.
Diabetes Stage 4 chronic kidney disease Urinary obstruction Anemia in chronic kidney disease, on chronic dialysis

## 2018-04-11 NOTE — PROGRESS NOTE ADULT - SUBJECTIVE AND OBJECTIVE BOX
CC: Vomiting/ARF/Anemia    INTERVAL HPI/OVERNIGHT EVENTS: Patient wants trial of voiding. Says daughter will take him home without Warren. Refuses SILVINA. Denies chest pain, SOB, dizziness, lightheadedness, nausea, vomiting, fever, chills.     Vital Signs Last 24 Hrs  T(C): 37.6 (11 Apr 2018 07:38), Max: 37.6 (11 Apr 2018 07:38)  T(F): 99.7 (11 Apr 2018 07:38), Max: 99.7 (11 Apr 2018 07:38)  HR: 87 (11 Apr 2018 07:38) (81 - 88)  BP: 122/67 (11 Apr 2018 07:38) (122/62 - 122/67)  BP(mean): --  RR: 18 (11 Apr 2018 07:38) (18 - 18)  SpO2: 94% (11 Apr 2018 07:38) (94% - 95%)    PHYSICAL EXAM:    	GENERAL:  NAD.  	NECK: soft, Supple, No JVD,   	CHEST/LUNG: Clear to auscultation bilaterally; No wheezing.  	HEART: S1S2+, Regular rate and rhythm; No murmurs.  	ABDOMEN: Soft, Nontender, Nondistended; Bowel sounds present.  	MUSCULOSKELETAL: Normal range of motion.  	SKIN: No rashes or lesions.  	NEURO: AAOX3, no focal deficits,    I&O's Detail    10 Apr 2018 07:01  -  11 Apr 2018 07:00  --------------------------------------------------------  IN:    sodium chloride 0.45% with potassium chloride 20 mEq/L: 780 mL  Total IN: 780 mL    OUT:    Indwelling Catheter - Urethral: 4650 mL  Total OUT: 4650 mL    Total NET: -3870 mL                                    6.5    7.4   )-----------( 228      ( 10 Apr 2018 06:33 )             21.8     10 Apr 2018 06:33    143    |  100    |  20.0   ----------------------------<  121    4.1     |  29.0   |  2.27     Ca    8.3        10 Apr 2018 06:33  Phos  2.6       10 Apr 2018 06:33  Mg     1.6       10 Apr 2018 06:33    TPro  6.1    /  Alb  3.1    /  TBili  0.6    /  DBili  x      /  AST  35     /  ALT  11     /  AlkPhos  71     10 Apr 2018 06:33      CAPILLARY BLOOD GLUCOSE      POCT Blood Glucose.: 152 mg/dL (11 Apr 2018 11:29)  POCT Blood Glucose.: 106 mg/dL (11 Apr 2018 07:45)  POCT Blood Glucose.: 151 mg/dL (10 Apr 2018 21:33)  POCT Blood Glucose.: 141 mg/dL (10 Apr 2018 17:58)    LIVER FUNCTIONS - ( 10 Apr 2018 06:33 )  Alb: 3.1 g/dL / Pro: 6.1 g/dL / ALK PHOS: 71 U/L / ALT: 11 U/L / AST: 35 U/L / GGT: x               MEDICATIONS  (STANDING):  atorvastatin 20 milliGRAM(s) Oral at bedtime  dextrose 5%. 1000 milliLiter(s) (50 mL/Hr) IV Continuous <Continuous>  dextrose 50% Injectable 12.5 Gram(s) IV Push once  dextrose 50% Injectable 25 Gram(s) IV Push once  dextrose 50% Injectable 25 Gram(s) IV Push once  folic acid 1 milliGRAM(s) Oral daily  insulin lispro (HumaLOG) corrective regimen sliding scale   SubCutaneous three times a day before meals  lidocaine 2% Gel 1 Application(s) Topical every 12 hours  polyethylene glycol 3350 17 Gram(s) Oral daily  sodium chloride 0.45% with potassium chloride 20 mEq/L 1000 milliLiter(s) (65 mL/Hr) IV Continuous <Continuous>  tamsulosin 0.8 milliGRAM(s) Oral at bedtime    MEDICATIONS  (PRN):  acetaminophen   Tablet 650 milliGRAM(s) Oral every 6 hours PRN For Temp greater than 38 C (100.4 F)  acetaminophen   Tablet. 650 milliGRAM(s) Oral every 6 hours PRN Mild Pain (1 - 3)  dextrose Gel 1 Dose(s) Oral once PRN Blood Glucose LESS THAN 70 milliGRAM(s)/deciliter  glucagon  Injectable 1 milliGRAM(s) IntraMuscular once PRN Glucose LESS THAN 70 milligrams/deciliter  morphine  - Injectable 2 milliGRAM(s) IV Push every 4 hours PRN Severe Pain (7 - 10)  oxyCODONE    5 mG/acetaminophen 325 mG 1 Tablet(s) Oral every 4 hours PRN Moderate Pain (4 - 6)      RADIOLOGY & ADDITIONAL TESTS:

## 2018-04-11 NOTE — DISCHARGE NOTE ADULT - MEDICATION SUMMARY - MEDICATIONS TO TAKE
I will START or STAY ON the medications listed below when I get home from the hospital:    rolling walker  -- 1   -- Indication: For debility    commode  -- 1   -- Indication: For debility    tamsulosin 0.4 mg oral capsule  -- 2 cap(s) by mouth once a day (at bedtime)  -- Indication: For bph    Januvia 50 mg oral tablet  -- 1 tab(s) by mouth once a day   -- Do not drink alcoholic beverages when taking this medication.    -- Indication: For diabetes    atorvastatin 20 mg oral tablet  -- 1 tab(s) by mouth once a day (at bedtime)  -- Indication: For hyperlipidmeia I will START or STAY ON the medications listed below when I get home from the hospital:    rolling walker  -- 1   -- Indication: For debility    commode  -- 1   -- Indication: For debility    tamsulosin 0.4 mg oral capsule  -- 2 cap(s) by mouth once a day (at bedtime)  -- Indication: For bph    Januvia 50 mg oral tablet  -- 1 tab(s) by mouth once a day   -- Do not drink alcoholic beverages when taking this medication.    -- Indication: For diabetes    atorvastatin 20 mg oral tablet  -- 1 tab(s) by mouth once a day (at bedtime)  -- Indication: For hyperlipidmeia    ferrous sulfate 325 mg (65 mg elemental iron) oral tablet  -- 1 tab(s) by mouth 3 times a day   -- Check with your doctor before becoming pregnant.  Do not chew, break, or crush.  May discolor urine or feces.    -- Indication: For Anemia in chronic kidney disease, on chronic dialysis    Multiple Vitamins oral tablet  -- 1 tab(s) by mouth once a day   -- Indication: For Anemia    folic acid 1 mg oral tablet  -- 1 tab(s) by mouth once a day   -- Indication: For Anemia in chronic kidney disease, on chronic dialysis

## 2018-04-11 NOTE — PROGRESS NOTE ADULT - SUBJECTIVE AND OBJECTIVE BOX
Patient seen and examined    I&O's Summary    2018 07:01  -  10 Apr 2018 07:00  --------------------------------------------------------  IN: 845 mL / OUT: 3400 mL / NET: -2555 mL    REVIEW OF SYSTEMS:    CONSTITUTIONAL: No F/C  RESPIRATORY: No cough or SOB  CARDIOVASCULAR: No CP/palpitations,    GASTROINTESTINAL: No abdominal pain , NVD   GENITOURINARY: No UTI sx, + Warren,  NEUROLOGICAL: No headaches/wk/numbness  MUSCULOSKELETAL:  No joint pain/swelling; No LBP  EXTREMITIES : no swelling,    Vital Signs Last 48 Hrs,    Vital Signs Last 24 Hrs  T(C): 36.1 (2018 23:13), Max: 37.6 (2018 07:38)  T(F): 97 (2018 23:13), Max: 99.7 (2018 07:38)  HR: 86 (2018 23:13) (83 - 87)  BP: 114/62 (2018 15:27) (114/62 - 122/67)  BP(mean): --  RR: 17 (2018 23:13) (17 - 18)  SpO2: 98% (2018 23:13) (94% - 98%)    T(C): 37.5 (10 Apr 2018 09:13), Max: 37.5 (10 Apr 2018 09:13)  T(F): 99.5 (10 Apr 2018 09:13), Max: 99.5 (10 Apr 2018 09:13)  HR: 94 (10 Apr 2018 09:13) (83 - 94)  BP: 119/67 (10 Apr 2018 09:13) (119/67 - 126/70)  BP(mean): --  RR: 18 (10 Apr 2018 09:13) (17 - 18)  SpO2: 95% (10 Apr 2018 09:13) (94% - 100%)    PHYSICAL EXAM:    GENERAL: NAD,   EYES:  conjunctiva and sclera clear  NECK: Supple, No JVD/Bruit  NERVOUS SYSTEM:  A/O x3,   CHEST:  CTA ,No rales or rhonchi  HEART:  RRR, No murmurs  ABDOMEN: Soft, NT/ND BS+  EXTREMITIES:  No Edema;  SKIN: No rashes    LABS:    143    |  100    |  20.0   ----------------------------<  121<H>  Ca:8.3<L> (10 Apr 2018 06:33)  4.1     |  29.0   |  2.27<H>        TPro  6.1<L>  /  Alb  3.1<L>  /  TBili  0.6    /  DBili  x      /  AST  35     /  ALT  11     /  AlkPhos  71     10 Apr 2018 06:33                        6.5<LL>  7.4   )-----------( 228      ( 10 Apr 2018 06:33 )             21.8<L>    Phos:2.6 mg/dL M.6 mg/dL PTH:-- Uric acid:-- Serum Osm:--  Ferritin:-- Iron:-- TIBC:-- Tsat:--  B12:-- TSH:-- (04-10 @ 06:33)      UProt:-- UCr:50 mg/dL P/C Ratio:7.9 Ratio 24 hour Prot:-- UVol:-- CrCl:--  Jesse:58 mmol/L UOsm:408 mosm/kg UVol:-- UCl:-- UK:-- ( @ 21:28)                          6.5    7.4   )-----------( 228      ( 10 Apr 2018 06:33 )             21.8     04-10    143  |  100  |  20.0  ----------------------------<  121<H>  4.1   |  29.0  |  2.27<H>    Ca    8.3<L>      10 Apr 2018 06:33  Phos  2.6     04-10  Mg     1.6     0410    TPro  6.1<L>  /  Alb  3.1<L>  /  TBili  0.6  /  DBili  x   /  AST  35  /  ALT  11  /  AlkPhos  71  04-10      MEDICATIONS  (STANDING):  acetaminophen   Tablet PRN  acetaminophen   Tablet. PRN  atorvastatin  dextrose 5%.  dextrose 50% Injectable  dextrose 50% Injectable  dextrose 50% Injectable  dextrose Gel PRN  folic acid  glucagon  Injectable PRN  insulin lispro (HumaLOG) corrective regimen sliding scale  lidocaine 2% Gel  morphine  - Injectable PRN  oxyCODONE    5 mG/acetaminophen 325 mG PRN  polyethylene glycol 3350  sodium chloride 0.45% with potassium chloride 20 mEq/L  tamsulosin          Assessment and Plan:     1.DMN, CKD  - 3 ( Optimized )    2.Profound Fe - Deficiency anemia    3.HARLEEN - Bilateral Obstructive uropathy ( Chronic )    4.CKD - Stage - 3     S ; Chronic Warren ,     DM Control, JULIETA,  Statins,    Maintain  Hydration, Off  HCO3,      OP F.U - When discharged,

## 2018-04-11 NOTE — DISCHARGE NOTE ADULT - PATIENT PORTAL LINK FT
You can access the [facility] Portal, offered by [facility], by registering with the following website: [email or web address]

## 2018-04-12 LAB
GLUCOSE BLDC GLUCOMTR-MCNC: 134 MG/DL — HIGH (ref 70–99)
GLUCOSE BLDC GLUCOMTR-MCNC: 137 MG/DL — HIGH (ref 70–99)
GLUCOSE BLDC GLUCOMTR-MCNC: 154 MG/DL — HIGH (ref 70–99)
HCT VFR BLD CALC: 22.4 % — LOW (ref 42–52)
HGB BLD-MCNC: 6.8 G/DL — CRITICAL LOW (ref 14–18)
MCHC RBC-ENTMCNC: 21.4 PG — LOW (ref 27–31)
MCHC RBC-ENTMCNC: 30.4 G/DL — LOW (ref 32–36)
MCV RBC AUTO: 70.4 FL — LOW (ref 80–94)
PLATELET # BLD AUTO: 235 K/UL — SIGNIFICANT CHANGE UP (ref 150–400)
RBC # BLD: 3.18 M/UL — LOW (ref 4.6–6.2)
RBC # FLD: 23.4 % — HIGH (ref 11–15.6)
WBC # BLD: 6.5 K/UL — SIGNIFICANT CHANGE UP (ref 4.8–10.8)
WBC # FLD AUTO: 6.5 K/UL — SIGNIFICANT CHANGE UP (ref 4.8–10.8)

## 2018-04-12 PROCEDURE — 99233 SBSQ HOSP IP/OBS HIGH 50: CPT

## 2018-04-12 PROCEDURE — 99232 SBSQ HOSP IP/OBS MODERATE 35: CPT

## 2018-04-12 RX ADMIN — LIDOCAINE 1 APPLICATION(S): 4 CREAM TOPICAL at 05:37

## 2018-04-12 RX ADMIN — POLYETHYLENE GLYCOL 3350 17 GRAM(S): 17 POWDER, FOR SOLUTION ORAL at 16:46

## 2018-04-12 RX ADMIN — LIDOCAINE 1 APPLICATION(S): 4 CREAM TOPICAL at 13:30

## 2018-04-12 RX ADMIN — Medication 1 MILLIGRAM(S): at 16:46

## 2018-04-12 RX ADMIN — TAMSULOSIN HYDROCHLORIDE 0.8 MILLIGRAM(S): 0.4 CAPSULE ORAL at 21:33

## 2018-04-12 RX ADMIN — DEXTROSE MONOHYDRATE, SODIUM CHLORIDE, AND POTASSIUM CHLORIDE 65 MILLILITER(S): 50; .745; 4.5 INJECTION, SOLUTION INTRAVENOUS at 07:01

## 2018-04-12 RX ADMIN — ATORVASTATIN CALCIUM 20 MILLIGRAM(S): 80 TABLET, FILM COATED ORAL at 21:33

## 2018-04-12 RX ADMIN — Medication 1: at 16:48

## 2018-04-12 NOTE — PROGRESS NOTE ADULT - SUBJECTIVE AND OBJECTIVE BOX
Patient seen and examined    I&O's Summary    2018 07:01  -  10 Apr 2018 07:00  --------------------------------------------------------  IN: 845 mL / OUT: 3400 mL / NET: -2555 mL    REVIEW OF SYSTEMS:    CONSTITUTIONAL: No F/C  RESPIRATORY: No cough or SOB  CARDIOVASCULAR: No CP/palpitations,    GASTROINTESTINAL: No abdominal pain , NVD   GENITOURINARY: No UTI sx, + Warren,  NEUROLOGICAL: No headaches/wk/numbness  MUSCULOSKELETAL:  No joint pain/swelling; No LBP  EXTREMITIES : no swelling, Warren D.C,    Vital Signs Last 48 Hrs,    Vital Signs Last 24 Hrs  T(C): 36.1 (2018 23:13), Max: 37.6 (2018 07:38)  T(F): 97 (2018 23:13), Max: 99.7 (2018 07:38)  HR: 86 (2018 23:13) (83 - 87)  BP: 114/62 (2018 15:27) (114/62 - 122/67)  BP(mean): --  RR: 17 (2018 23:13) (17 - 18)  SpO2: 98% (2018 23:13) (94% - 98%)    T(C): 37.5 (10 Apr 2018 09:13), Max: 37.5 (10 Apr 2018 09:13)  T(F): 99.5 (10 Apr 2018 09:13), Max: 99.5 (10 Apr 2018 09:13)  HR: 94 (10 Apr 2018 09:13) (83 - 94)  BP: 119/67 (10 Apr 2018 09:13) (119/67 - 126/70)  BP(mean): --  RR: 18 (10 Apr 2018 09:13) (17 - 18)  SpO2: 95% (10 Apr 2018 09:13) (94% - 100%)    PHYSICAL EXAM:    GENERAL: NAD,   EYES:  conjunctiva and sclera clear  NECK: Supple, No JVD/Bruit  NERVOUS SYSTEM:  A/O x3,   CHEST:  CTA ,No rales or rhonchi  HEART:  RRR, No murmurs  ABDOMEN: Soft, NT/ND BS+  EXTREMITIES:  No Edema;  SKIN: No rashes    LABS:        TPro  6.1<L>  /  Alb  3.1<L>  /  TBili  0.6    /  DBili  x      /  AST  35     /  ALT  11     /  AlkPhos  71     10 Apr 2018 06:33                        6.8<LL>  6.5   )-----------( 235      ( 2018 06:26 )             22.4<L>    Phos:2.6 mg/dL M.6 mg/dL PTH:-- Uric acid:-- Serum Osm:--  Ferritin:-- Iron:-- TIBC:-- Tsat:--  B12:-- TSH:-- (04-10 @ 06:33)      UProt:-- UCr:50 mg/dL P/C Ratio:7.9 Ratio 24 hour Prot:-- UVol:-- CrCl:--  Jesse:58 mmol/L UOsm:408 mosm/kg UVol:-- UCl:-- UK:-- ( @ 21:28)      143    |  100    |  20.0   ----------------------------<  121<H>  Ca:8.3<L> (10 Apr 2018 06:33)  4.1     |  29.0   |  2.27<H>        TPro  6.1<L>  /  Alb  3.1<L>  /  TBili  0.6    /  DBili  x      /  AST  35     /  ALT  11     /  AlkPhos  71     10 Apr 2018 06:33                        6.5<LL>  7.4   )-----------( 228      ( 10 Apr 2018 06:33 )             21.8<L>    Phos:2.6 mg/dL M.6 mg/dL PTH:-- Uric acid:-- Serum Osm:--  Ferritin:-- Iron:-- TIBC:-- Tsat:--  B12:-- TSH:-- (04-10 @ 06:33)      UProt:-- UCr:50 mg/dL P/C Ratio:7.9 Ratio 24 hour Prot:-- UVol:-- CrCl:--  Jesse:58 mmol/L UOsm:408 mosm/kg UVol:-- UCl:-- UK:-- ( @ 21:28)                          6.5    7.4   )-----------( 228      ( 10 Apr 2018 06:33 )             21.8     04-10    143  |  100  |  20.0  ----------------------------<  121<H>  4.1   |  29.0  |  2.27<H>    Ca    8.3<L>      10 Apr 2018 06:33  Phos  2.6     -10  Mg     1.6     04-10    TPro  6.1<L>  /  Alb  3.1<L>  /  TBili  0.6  /  DBili  x   /  AST  35  /  ALT  11  /  AlkPhos  71  04-10      MEDICATIONS  (STANDING):  acetaminophen   Tablet PRN  acetaminophen   Tablet. PRN  atorvastatin  dextrose 5%.  dextrose 50% Injectable  dextrose 50% Injectable  dextrose 50% Injectable  dextrose Gel PRN  folic acid  glucagon  Injectable PRN  insulin lispro (HumaLOG) corrective regimen sliding scale  lidocaine 2% Gel  morphine  - Injectable PRN  oxyCODONE    5 mG/acetaminophen 325 mG PRN  polyethylene glycol 3350  sodium chloride 0.45% with potassium chloride 20 mEq/L  tamsulosin          Assessment and Plan:     1.DMN, CKD  - 3 ( Optimized )    2.Profound Fe - Deficiency anemia    3.HARLEEN - Bilateral Obstructive uropathy ( Chronic )        S ; s/P Warren ,     DM Control, JULIETA,  Statins,    Maintain  Hydration, Off  HCO3,      OP F.U - When discharged,      Thank you,

## 2018-04-12 NOTE — PROGRESS NOTE ADULT - SUBJECTIVE AND OBJECTIVE BOX
ANTOINETTE WILSON    [id number]    67y      Male    INTERVAL HPI/OVERNIGHT EVENTS:    patient being seen for urinary obstruction, dm2 and ckd, Patient had bourne removed yesterday but retained and has been straight cathed multiple times.       REVIEW OF SYSTEMS:    CONSTITUTIONAL: No fever, weight loss, or fatigue  RESPIRATORY: No cough, wheezing, hemoptysis; No shortness of breath  CARDIOVASCULAR: No chest pain, palpitations  GASTROINTESTINAL: No abdominal or epigastric pain. No nausea, vomiting  NEUROLOGICAL: No headaches, memory loss, loss of strength.  MISCELLANEOUS:      Vital Signs Last 24 Hrs  T(C): 36.7 (12 Apr 2018 07:37), Max: 36.8 (11 Apr 2018 15:27)  T(F): 98 (12 Apr 2018 07:37), Max: 98.3 (11 Apr 2018 15:27)  HR: 85 (12 Apr 2018 07:37) (83 - 86)  BP: 119/64 (12 Apr 2018 07:37) (114/62 - 119/64)  BP(mean): --  RR: 18 (12 Apr 2018 07:37) (17 - 18)  SpO2: 96% (12 Apr 2018 07:37) (96% - 98%)    PHYSICAL EXAM:    	GENERAL:  NAD.  	NECK: soft, Supple, No JVD,   	CHEST/LUNG: Clear to auscultation bilaterally; No wheezing.  	HEART: S1S2+, Regular rate and rhythm; No murmurs.  	ABDOMEN: Soft, Nontender, Nondistended; Bowel sounds present.  	MUSCULOSKELETAL: Normal range of motion.  	SKIN: No rashes or lesions.  	NEURO: AAOX3, no focal deficits,      LABS:                        6.8    6.5   )-----------( 235      ( 12 Apr 2018 06:26 )             22.4             MEDICATIONS  (STANDING):  atorvastatin 20 milliGRAM(s) Oral at bedtime  dextrose 5%. 1000 milliLiter(s) (50 mL/Hr) IV Continuous <Continuous>  dextrose 50% Injectable 12.5 Gram(s) IV Push once  dextrose 50% Injectable 25 Gram(s) IV Push once  dextrose 50% Injectable 25 Gram(s) IV Push once  folic acid 1 milliGRAM(s) Oral daily  insulin lispro (HumaLOG) corrective regimen sliding scale   SubCutaneous three times a day before meals  lidocaine 2% Gel 1 Application(s) Topical every 12 hours  polyethylene glycol 3350 17 Gram(s) Oral daily  sodium chloride 0.45% with potassium chloride 20 mEq/L 1000 milliLiter(s) (65 mL/Hr) IV Continuous <Continuous>  tamsulosin 0.8 milliGRAM(s) Oral at bedtime    MEDICATIONS  (PRN):  acetaminophen   Tablet 650 milliGRAM(s) Oral every 6 hours PRN For Temp greater than 38 C (100.4 F)  acetaminophen   Tablet. 650 milliGRAM(s) Oral every 6 hours PRN Mild Pain (1 - 3)  dextrose Gel 1 Dose(s) Oral once PRN Blood Glucose LESS THAN 70 milliGRAM(s)/deciliter  glucagon  Injectable 1 milliGRAM(s) IntraMuscular once PRN Glucose LESS THAN 70 milligrams/deciliter  morphine  - Injectable 2 milliGRAM(s) IV Push every 4 hours PRN Severe Pain (7 - 10)      RADIOLOGY & ADDITIONAL TESTS:

## 2018-04-13 VITALS
RESPIRATION RATE: 18 BRPM | SYSTOLIC BLOOD PRESSURE: 120 MMHG | HEART RATE: 84 BPM | DIASTOLIC BLOOD PRESSURE: 66 MMHG | OXYGEN SATURATION: 98 %

## 2018-04-13 LAB
GLUCOSE BLDC GLUCOMTR-MCNC: 101 MG/DL — HIGH (ref 70–99)
GLUCOSE BLDC GLUCOMTR-MCNC: 148 MG/DL — HIGH (ref 70–99)
GLUCOSE BLDC GLUCOMTR-MCNC: 188 MG/DL — HIGH (ref 70–99)

## 2018-04-13 PROCEDURE — 85610 PROTHROMBIN TIME: CPT

## 2018-04-13 PROCEDURE — 84156 ASSAY OF PROTEIN URINE: CPT

## 2018-04-13 PROCEDURE — 86850 RBC ANTIBODY SCREEN: CPT

## 2018-04-13 PROCEDURE — 81001 URINALYSIS AUTO W/SCOPE: CPT

## 2018-04-13 PROCEDURE — 76775 US EXAM ABDO BACK WALL LIM: CPT

## 2018-04-13 PROCEDURE — 85730 THROMBOPLASTIN TIME PARTIAL: CPT

## 2018-04-13 PROCEDURE — 84100 ASSAY OF PHOSPHORUS: CPT

## 2018-04-13 PROCEDURE — 86901 BLOOD TYPING SEROLOGIC RH(D): CPT

## 2018-04-13 PROCEDURE — 93005 ELECTROCARDIOGRAM TRACING: CPT

## 2018-04-13 PROCEDURE — 80069 RENAL FUNCTION PANEL: CPT

## 2018-04-13 PROCEDURE — 83550 IRON BINDING TEST: CPT

## 2018-04-13 PROCEDURE — 80061 LIPID PANEL: CPT

## 2018-04-13 PROCEDURE — 74176 CT ABD & PELVIS W/O CONTRAST: CPT

## 2018-04-13 PROCEDURE — 96374 THER/PROPH/DIAG INJ IV PUSH: CPT | Mod: XU

## 2018-04-13 PROCEDURE — 87086 URINE CULTURE/COLONY COUNT: CPT

## 2018-04-13 PROCEDURE — 82436 ASSAY OF URINE CHLORIDE: CPT

## 2018-04-13 PROCEDURE — 83036 HEMOGLOBIN GLYCOSYLATED A1C: CPT

## 2018-04-13 PROCEDURE — 82962 GLUCOSE BLOOD TEST: CPT

## 2018-04-13 PROCEDURE — 83935 ASSAY OF URINE OSMOLALITY: CPT

## 2018-04-13 PROCEDURE — 80053 COMPREHEN METABOLIC PANEL: CPT

## 2018-04-13 PROCEDURE — 99239 HOSP IP/OBS DSCHRG MGMT >30: CPT

## 2018-04-13 PROCEDURE — 86900 BLOOD TYPING SEROLOGIC ABO: CPT

## 2018-04-13 PROCEDURE — 71045 X-RAY EXAM CHEST 1 VIEW: CPT

## 2018-04-13 PROCEDURE — 82607 VITAMIN B-12: CPT

## 2018-04-13 PROCEDURE — 84466 ASSAY OF TRANSFERRIN: CPT

## 2018-04-13 PROCEDURE — 97163 PT EVAL HIGH COMPLEX 45 MIN: CPT

## 2018-04-13 PROCEDURE — 99285 EMERGENCY DEPT VISIT HI MDM: CPT | Mod: 25

## 2018-04-13 PROCEDURE — 36415 COLL VENOUS BLD VENIPUNCTURE: CPT

## 2018-04-13 PROCEDURE — 83690 ASSAY OF LIPASE: CPT

## 2018-04-13 PROCEDURE — 99233 SBSQ HOSP IP/OBS HIGH 50: CPT

## 2018-04-13 PROCEDURE — 51702 INSERT TEMP BLADDER CATH: CPT

## 2018-04-13 PROCEDURE — 82272 OCCULT BLD FECES 1-3 TESTS: CPT

## 2018-04-13 PROCEDURE — 97110 THERAPEUTIC EXERCISES: CPT

## 2018-04-13 PROCEDURE — 80048 BASIC METABOLIC PNL TOTAL CA: CPT

## 2018-04-13 PROCEDURE — 83735 ASSAY OF MAGNESIUM: CPT

## 2018-04-13 PROCEDURE — 97530 THERAPEUTIC ACTIVITIES: CPT

## 2018-04-13 PROCEDURE — 84300 ASSAY OF URINE SODIUM: CPT

## 2018-04-13 PROCEDURE — 82728 ASSAY OF FERRITIN: CPT

## 2018-04-13 PROCEDURE — 84443 ASSAY THYROID STIM HORMONE: CPT

## 2018-04-13 PROCEDURE — 85027 COMPLETE CBC AUTOMATED: CPT

## 2018-04-13 PROCEDURE — 97116 GAIT TRAINING THERAPY: CPT

## 2018-04-13 PROCEDURE — 82746 ASSAY OF FOLIC ACID SERUM: CPT

## 2018-04-13 RX ADMIN — Medication 1: at 17:45

## 2018-04-13 RX ADMIN — LIDOCAINE 1 APPLICATION(S): 4 CREAM TOPICAL at 17:47

## 2018-04-13 RX ADMIN — Medication 1 MILLIGRAM(S): at 14:21

## 2018-04-13 RX ADMIN — POLYETHYLENE GLYCOL 3350 17 GRAM(S): 17 POWDER, FOR SOLUTION ORAL at 14:21

## 2018-04-13 NOTE — PROGRESS NOTE ADULT - ASSESSMENT
66 y/o male with PMHx of DM-II, legally blind, reported no other PMHx and not on any medicine at home though has not seen any PMD in years, presented to ED with vomiting. In ED found to have renal failure acute vs chronic, UTI and admitted to medicine for further management. Nephrology  and Urology consulted.    1) Renal Failure--< likely chronic  --> cr stabilized  - IV fluids as per Renal.     2) UTI with b/l hydronephrosis:  -urine culture --> negative  --> completed iv abx  ->repeat us shows persistent hydro on 4/6  --> maintain bourne   --> hematuria resolving    Microcytic anemia likely chronic:  - iron, guaic negative  - ABSOLUTELY refusing transfusions   --> epo, c,w iv iron   --> H&H stable at 6.5/21.8    H/o DM-II- a1c elevated  --> ssi  --> patient seems to be controlled on diet, may not need insulin on discharge, continue to monitor  --> diabetic education     hld --.> statin    Blindness- Keep on fall precaution, enhanced supervision.    pain --> resolved    dispo -->  renew bourne  pt consulted, Social work to escalate barrier to discharge. Patient is refusing sub acute. Daughter has offered to take patient home but has not answered phone and has not been available for teaching for diabetes and bourne care. Ex wife unwilling to take patient home.
1) Renal Failure--< likely chronic  --> cr stabilized  - plan as per nephro    2) UTI with b/l hydronephrosis:  -urine culture --> negative  --> completed iv abx  ->repeat us shows persistent hydro  --> maintain bourne     Microcytic anemia likely chronic:  - iron, guaic negative  - ABSOLUTELY refusing transfusions   --> epo, c,w iv iron   --> check cbc every few days     H/o DM-II- a1c elevated  --> ssi  --> may need insulin on dc given creatinine  --> diabetic education     hld --.> statin    Blindness- Keep on fall precaution, enhanced supervision.    pain --> resolved    dispo -->  renew bourne  pt consult   refusing SILVINA
1.DMN,    2.Profound Fe - Deficiency anemia    3.HARLEEN - Bilateral Obstructive uropathy ( Chronic )    4.CKD - Stage ?    S ; IV Fe , Blood TX , Kelvin , W.U, DM Control,    Appreciate  Evaluation
1.DMN, CKD  - 3 ( Optimized )    2.Profound Fe - Deficiency anemia    3.HARLEEN - Bilateral Obstructive uropathy ( Chronic )    4.CKD - Stage - 3     S ; Chronic Warren ,     DM Control, JULIETA,  Statins,    Maintain IV Hydration, Off  HCO3,
1.DMN, CKD - 4 .    2.Profound Fe - Deficiency anemia    3.HARLEEN - Bilateral Obstructive uropathy ( Chronic )    4.CKD - Stage ?    S ; IV Fe , Blood TX , Warren ,     DM Control,    Maintain IV Hydration,    on epo 40k units    dw hospitalist
1.DMN, CKD - 4 .    2.Profound Fe - Deficiency anemia    3.HARLEEN - Bilateral Obstructive uropathy ( Chronic )    4.CKD - Stage ?    S ; Warren ,     DM Control, JULIETA, Statins,    Maintain IV Hydration,
66 y/o male with PMHx of DM-II, legally blind, reported no other PMHx and not on any medicine at home though has not seen any PMD in years, presented to ED with vomiting. In ED found to have renal failure acute vs chronic, UTI and admitted to medicine for further management. Nephrology  and Urology consulted.    1) Renal Failure--< likely chronic  --> cr stabilized    2) UTI with b/l hydronephrosis:  -urine culture --> negative  --> completed iv abx  ->repeat us shows persistent hydro on 4/6  --> TOV failed  --> may need long term bourne     3) Microcytic anemia likely chronic:  - iron, guaic negative  - ABSOLUTELY refusing transfusions   --> epo, c,w iv iron   --> H&H  improved    4) H/o DM-II- a1c elevated  --> ssi  --> patient seems to be controlled on diet, may not need insulin on discharge, will start low dose Januvia on discharge, follow up with PMD as outpatient  --> diabetic education     5) hld --.> statin    6) Blindness- Keep on fall precaution, enhanced supervision.    7) pain --> resolved    dispo -->had long discussion with patient, patient will think about going to [facility].   will have to go home with bourne or to [facility],
66 y/o male with PMHx of DM-II, legally blind, reported no other PMHx and not on any medicine at home though has not seen any PMD in years, presented to ED with vomiting. In ED found to have renal failure acute vs chronic, UTI and admitted to medicine for further management. Nephrology  and Urology consulted.    1) Renal Failure--< likely chronic  --> cr stabilized  - IV fluids as per Renal.     2) UTI with b/l hydronephrosis:  -urine culture --> negative  --> completed iv abx  ->repeat us shows persistent hydro  --> maintain bourne     Microcytic anemia likely chronic:  - iron, guaic negative  - ABSOLUTELY refusing transfusions   --> epo, c,w iv iron   --> check cbc in am    H/o DM-II- a1c elevated  --> ssi  --> may need insulin on dc given creatinine  --> diabetic education     hld --.> statin    Blindness- Keep on fall precaution, enhanced supervision.    pain --> resolved    dispo -->  renew bourne  pt consulted, Social work to speak with ex wife regarding ability to care for patient at home with current issues.
68 y/o male with PMHx of DM-II, legally blind, reported no other PMHx and not on any medicine at home though has not seen any PMD in years, presented to ED with vomiting. In ED found to have renal failure acute vs chronic, UTI and admitted to medicine for further management. Nephrology  and Urology consulted.    1) Renal Failure--< likely chronic  --> cr stabilized      2) UTI with b/l hydronephrosis:  -urine culture --> negative  --> completed iv abx  --> hematuria resolved    Microcytic anemia likely chronic:  - iron, guaic negative  - ABSOLUTELY refusing transfusions   --> epo, c,w iv iron   --> H&H stable at 6.5/21.8    H/o DM-II- a1c elevated  --> ssi  --> patient seems to be controlled on diet, may not need insulin on discharge, will start low dose Januvia on discharge, follow up with PMD as outpatient  --> diabetic education     hld --.> statin    Blindness- Keep on fall precaution, enhanced supervision.    pain --> resolved    dispo -->Patient to [facility]INA today
68 y/o male with PMHx of DM-II, legally blind, reported no other PMHx and not on any medicine at home though has not seen any PMD in years, presented to ED with vomiting. In ED found to have renal failure acute vs chronic, UTI and admitted to medicine for further management. Nephrology  and Urology consulted.    1) Renal Failure--< likely chronic  --> cr stabilized  - IV fluids as per Renal.     2) UTI with b/l hydronephrosis:  -urine culture --> negative  --> completed iv abx  ->repeat us shows persistent hydro on 4/6  --> TOV  --> hematuria resolved    Microcytic anemia likely chronic:  - iron, guaic negative  - ABSOLUTELY refusing transfusions   --> epo, c,w iv iron   --> H&H stable at 6.5/21.8    H/o DM-II- a1c elevated  --> ssi  --> patient seems to be controlled on diet, may not need insulin on discharge, will start low dose Januvia on discharge, follow up with PMD as outpatient  --> diabetic education     hld --.> statin    Blindness- Keep on fall precaution, enhanced supervision.    pain --> resolved    dispo -->Patient refusing SILVINA, home care. Will go home with daughter today if voids after bourne dc
Renal Failure unclear acute vs chronic:  - Bun/Cr 47/2.55. Renal function normal in 2015. ? Obstructive uropathy from prostate.  - Nephrology following    UTI with b/l hydronephrosis:  - UA noted with bacteria, LE, WBC.  - c.w iv ceft for 3 days     Microcytic anemia likely chronic:  - Hgb 7.7, 10 in 2015.  - iron, guaic negative  - ABSOLUTELY refusing tranfusions     H/o DM-II- a1c elevatd  --> ssi  --> may need insulin on dc given creatinine    hld --.> low hdl  --> statin    Blindness- Keep on fall precaution, enhanced supervision.    dispo --. renew bourne  --> inc flomax to 0.8mg   pt consult
Renal Failure unclear acute vs chronic:  - Bun/Cr 47/2.55. Renal function normal in 2015. ? Obstructive uropathy from prostate.  - Nephrology following    UTI with b/l hydronephrosis:  - UA noted with bacteria, LE, WBC.  -urine culture --> negative  --> completed iv abx    Microcytic anemia likely chronic:  - iron, guaic negative  - ABSOLUTELY refusing tranfusions   --> start epo, c,w iv iron     H/o DM-II- a1c elevatd  --> ssi  --> may need insulin on dc given creatinine    hld --.> low hdl  --> statin    Blindness- Keep on fall precaution, enhanced supervision.    pain --> iv morphine prn   --> advised not to start pyridium given current GFR    dispo --. renew bourne  pt consult
Renal Failure unclear acute vs chronic:  - Bun/Cr 47/2.55. Renal function normal in 2015. ? Obstructive uropathy from prostate.  - Nephrology following    UTI with b/l hydronephrosis:  - UA noted with bacteria, LE, WBC.  -urine culture --> negative  --> completed iv abx  -> will order repeat renal us   --> urology following    Microcytic anemia likely chronic:  - iron, guaic negative  - ABSOLUTELY refusing transfusions   --> epo, c,w iv iron     H/o DM-II- a1c elevated  --> ssi  --> may need insulin on dc given creatinine    hld --.> low hdl  --> statin    Blindness- Keep on fall precaution, enhanced supervision.    pain --> iv morphine prn   --> advised not to start pyridium given current GFR  --> resolved    dispo -->  renew bourne  pt consult
Renal Failure unclear acute vs chronic:  - Bun/Cr 47/2.55. Renal function normal in 2015. ? Obstructive uropathy from prostate.  - Nephrology following    UTI with b/l hydronephrosis:  -urine culture --> negative  --> completed iv abx  ->repeat us shows persistent hydro  --> maintain bourne     Microcytic anemia likely chronic:  - iron, guaic negative  - ABSOLUTELY refusing transfusions   --> epo, c,w iv iron   --> check cbc in am (every 48 hours)    H/o DM-II- a1c elevated  --> ssi  --> may need insulin on dc given creatinine    hld --.> low hdl  --> statin    Blindness- Keep on fall precaution, enhanced supervision.    pain --> iv morphine prn   --> advised not to start pyridium given current GFR  --> resolved  --> lidocaine jelly     dispo -->  renew bourne
Urinary retention  HARLEEN
1.DMN, CKD - 3 ( Optimized )    2.Profound Fe - Deficiency anemia    3.HARLEEN - Bilateral Obstructive uropathy ( Chronic )    4.CKD - Stage - 3     S ; Chronic Warren ,     DM Control, JULIETA, Statins,    Maintain IV Hydration, D.C HCO3,

## 2018-04-13 NOTE — PROGRESS NOTE ADULT - SUBJECTIVE AND OBJECTIVE BOX
CC: Vomiting/ARF/Anemia    INTERVAL HPI/OVERNIGHT EVENTS: Patient with bourne, agrees to SILVINA. Denies chest pain, SOB, dizziness, lightheadedness, nausea, vomiting, fever, chills.     Vital Signs Last 24 Hrs  T(C): 37.2 (13 Apr 2018 09:19), Max: 37.2 (13 Apr 2018 09:19)  T(F): 99 (13 Apr 2018 09:19), Max: 99 (13 Apr 2018 09:19)  HR: 70 (13 Apr 2018 09:19) (70 - 83)  BP: 114/65 (13 Apr 2018 09:19) (114/65 - 146/82)  BP(mean): --  RR: 18 (13 Apr 2018 09:19) (18 - 18)  SpO2: 98% (13 Apr 2018 09:19) (98% - 98%)  PHYSICAL EXAM:    	GENERAL:  NAD.  	NECK: soft, Supple, No JVD,   	CHEST/LUNG: Clear to auscultation bilaterally; No wheezing.  	HEART: S1S2+, Regular rate and rhythm; No murmurs.  	ABDOMEN: Soft, Nontender, Nondistended; Bowel sounds present.  	MUSCULOSKELETAL: Normal range of motion.  	SKIN: No rashes or lesions.  	NEURO: AAOX3, no focal deficits,      I&O's Detail    12 Apr 2018 07:01  -  13 Apr 2018 07:00  --------------------------------------------------------  IN:  Total IN: 0 mL    OUT:    Indwelling Catheter - Urethral: 2950 mL    Voided: 300 mL  Total OUT: 3250 mL    Total NET: -3250 mL      13 Apr 2018 07:01  -  13 Apr 2018 15:20  --------------------------------------------------------  IN:    Oral Fluid: 120 mL  Total IN: 120 mL    OUT:    Indwelling Catheter - Urethral: 1325 mL  Total OUT: 1325 mL    Total NET: -1205 mL                                    6.8    6.5   )-----------( 235      ( 12 Apr 2018 06:26 )             22.4             CAPILLARY BLOOD GLUCOSE      POCT Blood Glucose.: 148 mg/dL (13 Apr 2018 12:39)  POCT Blood Glucose.: 101 mg/dL (13 Apr 2018 08:37)  POCT Blood Glucose.: 134 mg/dL (12 Apr 2018 21:31)  POCT Blood Glucose.: 154 mg/dL (12 Apr 2018 16:50)          MEDICATIONS  (STANDING):  atorvastatin 20 milliGRAM(s) Oral at bedtime  dextrose 5%. 1000 milliLiter(s) (50 mL/Hr) IV Continuous <Continuous>  dextrose 50% Injectable 12.5 Gram(s) IV Push once  dextrose 50% Injectable 25 Gram(s) IV Push once  dextrose 50% Injectable 25 Gram(s) IV Push once  folic acid 1 milliGRAM(s) Oral daily  insulin lispro (HumaLOG) corrective regimen sliding scale   SubCutaneous three times a day before meals  lidocaine 2% Gel 1 Application(s) Topical every 12 hours  polyethylene glycol 3350 17 Gram(s) Oral daily  tamsulosin 0.8 milliGRAM(s) Oral at bedtime    MEDICATIONS  (PRN):  acetaminophen   Tablet 650 milliGRAM(s) Oral every 6 hours PRN For Temp greater than 38 C (100.4 F)  acetaminophen   Tablet. 650 milliGRAM(s) Oral every 6 hours PRN Mild Pain (1 - 3)  dextrose Gel 1 Dose(s) Oral once PRN Blood Glucose LESS THAN 70 milliGRAM(s)/deciliter  glucagon  Injectable 1 milliGRAM(s) IntraMuscular once PRN Glucose LESS THAN 70 milligrams/deciliter      RADIOLOGY & ADDITIONAL TESTS:

## 2018-04-13 NOTE — PROGRESS NOTE ADULT - PROVIDER SPECIALTY LIST ADULT
Hospitalist
Internal Medicine
Nephrology
Urology
Hospitalist

## 2018-04-27 ENCOUNTER — APPOINTMENT (OUTPATIENT)
Dept: UROLOGY | Facility: CLINIC | Age: 68
End: 2018-04-27
Payer: MEDICARE

## 2018-04-27 VITALS — SYSTOLIC BLOOD PRESSURE: 132 MMHG | TEMPERATURE: 98 F | DIASTOLIC BLOOD PRESSURE: 60 MMHG | HEART RATE: 80 BPM

## 2018-04-27 PROCEDURE — 51798 US URINE CAPACITY MEASURE: CPT

## 2018-04-27 PROCEDURE — 81003 URINALYSIS AUTO W/O SCOPE: CPT | Mod: QW

## 2018-04-27 PROCEDURE — 51702 INSERT TEMP BLADDER CATH: CPT

## 2018-04-27 PROCEDURE — 99213 OFFICE O/P EST LOW 20 MIN: CPT | Mod: 25

## 2018-04-29 LAB
BILIRUB UR QL STRIP: NORMAL
CLARITY UR: NORMAL
COLLECTION METHOD: NORMAL
GLUCOSE UR-MCNC: NORMAL
HCG UR QL: 0.2 EU/DL
HGB UR QL STRIP.AUTO: NORMAL
KETONES UR-MCNC: NORMAL
LEUKOCYTE ESTERASE UR QL STRIP: NORMAL
NITRITE UR QL STRIP: NORMAL
PH UR STRIP: 5.5
PROT UR STRIP-MCNC: 30
SP GR UR STRIP: 1.01

## 2018-05-30 ENCOUNTER — APPOINTMENT (OUTPATIENT)
Dept: UROLOGY | Facility: CLINIC | Age: 68
End: 2018-05-30

## 2018-07-17 ENCOUNTER — APPOINTMENT (OUTPATIENT)
Dept: UROLOGY | Facility: CLINIC | Age: 68
End: 2018-07-17

## 2018-07-18 ENCOUNTER — APPOINTMENT (OUTPATIENT)
Dept: UROLOGY | Facility: CLINIC | Age: 68
End: 2018-07-18
Payer: MEDICARE

## 2018-07-18 VITALS
DIASTOLIC BLOOD PRESSURE: 79 MMHG | TEMPERATURE: 98.6 F | WEIGHT: 258 LBS | BODY MASS INDEX: 36.12 KG/M2 | SYSTOLIC BLOOD PRESSURE: 157 MMHG | HEIGHT: 71 IN | HEART RATE: 96 BPM

## 2018-07-18 DIAGNOSIS — N40.0 BENIGN PROSTATIC HYPERPLASIA WITHOUT LOWER URINARY TRACT SYMPMS: ICD-10-CM

## 2018-07-18 PROCEDURE — 99213 OFFICE O/P EST LOW 20 MIN: CPT

## 2018-07-18 RX ORDER — FINASTERIDE 5 MG/1
5 TABLET, FILM COATED ORAL DAILY
Qty: 30 | Refills: 5 | Status: ACTIVE | COMMUNITY
Start: 2018-07-18 | End: 1900-01-01

## 2018-07-23 ENCOUNTER — RESULT REVIEW (OUTPATIENT)
Age: 68
End: 2018-07-23

## 2018-07-23 DIAGNOSIS — Z87.440 PERSONAL HISTORY OF URINARY (TRACT) INFECTIONS: ICD-10-CM

## 2018-07-23 LAB — BACTERIA UR CULT: ABNORMAL

## 2018-07-25 ENCOUNTER — APPOINTMENT (OUTPATIENT)
Dept: UROLOGY | Facility: CLINIC | Age: 68
End: 2018-07-25
Payer: MEDICARE

## 2018-07-25 VITALS
SYSTOLIC BLOOD PRESSURE: 175 MMHG | HEART RATE: 93 BPM | HEIGHT: 71 IN | WEIGHT: 258 LBS | DIASTOLIC BLOOD PRESSURE: 87 MMHG | BODY MASS INDEX: 36.12 KG/M2 | TEMPERATURE: 98.4 F

## 2018-07-25 LAB — PSA SERPL-MCNC: 15.2 NG/ML

## 2018-07-25 PROCEDURE — 51702 INSERT TEMP BLADDER CATH: CPT

## 2018-07-25 PROCEDURE — 51798 US URINE CAPACITY MEASURE: CPT

## 2018-07-25 RX ORDER — TAMSULOSIN HYDROCHLORIDE 0.4 MG/1
0.4 CAPSULE ORAL
Qty: 30 | Refills: 6 | Status: ACTIVE | COMMUNITY
Start: 2018-07-25 | End: 1900-01-01

## 2018-08-01 ENCOUNTER — APPOINTMENT (OUTPATIENT)
Dept: UROLOGY | Facility: CLINIC | Age: 68
End: 2018-08-01
Payer: MEDICARE

## 2018-08-01 PROCEDURE — 52000 CYSTOURETHROSCOPY: CPT

## 2018-08-01 RX ORDER — GENTAMICIN SULFATE 40 MG/ML
40 INJECTION, SOLUTION INTRAMUSCULAR; INTRAVENOUS
Qty: 1 | Refills: 0 | Status: COMPLETED | OUTPATIENT
Start: 2018-08-01

## 2018-08-01 RX ORDER — CEPHALEXIN 500 MG/1
500 CAPSULE ORAL
Qty: 14 | Refills: 0 | Status: DISCONTINUED | COMMUNITY
Start: 2018-07-23 | End: 2018-08-01

## 2018-08-01 RX ADMIN — GENTAMICIN SULFATE 0 MG/ML: 40 INJECTION, SOLUTION INTRAMUSCULAR; INTRAVENOUS at 00:00

## 2018-08-31 ENCOUNTER — APPOINTMENT (OUTPATIENT)
Dept: UROLOGY | Facility: CLINIC | Age: 68
End: 2018-08-31
Payer: MEDICARE

## 2018-08-31 PROCEDURE — 51702 INSERT TEMP BLADDER CATH: CPT

## 2018-09-06 ENCOUNTER — APPOINTMENT (OUTPATIENT)
Dept: UROLOGY | Facility: HOSPITAL | Age: 68
End: 2018-09-06

## 2018-09-18 ENCOUNTER — APPOINTMENT (OUTPATIENT)
Dept: UROLOGY | Facility: CLINIC | Age: 68
End: 2018-09-18

## 2018-09-20 ENCOUNTER — EMERGENCY (EMERGENCY)
Facility: HOSPITAL | Age: 68
LOS: 1 days | Discharge: HOME | End: 2018-09-20
Attending: EMERGENCY MEDICINE
Payer: MEDICARE

## 2018-09-20 VITALS
DIASTOLIC BLOOD PRESSURE: 76 MMHG | RESPIRATION RATE: 16 BRPM | SYSTOLIC BLOOD PRESSURE: 177 MMHG | TEMPERATURE: 98 F | HEART RATE: 81 BPM | OXYGEN SATURATION: 99 %

## 2018-09-20 PROCEDURE — 99282 EMERGENCY DEPT VISIT SF MDM: CPT

## 2018-09-20 PROCEDURE — 99283 EMERGENCY DEPT VISIT LOW MDM: CPT | Mod: 25

## 2018-09-20 PROCEDURE — 51702 INSERT TEMP BLADDER CATH: CPT

## 2018-09-20 NOTE — ED PROVIDER NOTE - PROGRESS NOTE DETAILS
Warren changed by RN with clear urine.  No leaking noted.  Pt stable for d/c with Urology f/u as outpt

## 2018-09-20 NOTE — ED ADULT NURSE NOTE - NSIMPLEMENTINTERV_GEN_ALL_ED
Implemented All Fall Risk Interventions:  [place] to call system. Call bell, personal items and telephone within reach. Instruct patient to call for assistance. Room bathroom lighting operational. Non-slip footwear when patient is off stretcher. Physically safe environment: no spills, clutter or unnecessary equipment. Stretcher in lowest position, wheels locked, appropriate side rails in place. Provide visual cue, wrist band, yellow gown, etc. Monitor gait and stability. Monitor for mental status changes and reorient to person, place, and time. Review medications for side effects contributing to fall risk. Reinforce activity limits and safety measures with patient and family.

## 2018-09-20 NOTE — ED PROVIDER NOTE - OBJECTIVE STATEMENT
A 68 year old male pt with a hx of bourne cath presents to the ED c/o leaking from Bourne Catheter. The pt states that earlier today he woke up to find urine leaking from his Bourne. Pt is blind and sates that he was unsure where it was leaking from but tried to change the bourne bag, which did not stop the leaking. Pt's urologist is Dr. Ventura and he is to be evaluated for bourne removal next month. He denies any abdominal pain, dysuria, hematuria, nausea or vomiting. Pt last had his Bourne changed one month ago. No further complaints at this time.

## 2018-09-20 NOTE — ED PROVIDER NOTE - MEDICAL DECISION MAKING DETAILS
A 68 year old male pt presents to the ED c/o leaking from bourne. Will change bourne and re-evaluate.

## 2018-09-21 ENCOUNTER — APPOINTMENT (OUTPATIENT)
Dept: UROLOGY | Facility: CLINIC | Age: 68
End: 2018-09-21

## 2018-10-05 ENCOUNTER — APPOINTMENT (OUTPATIENT)
Dept: UROLOGY | Facility: CLINIC | Age: 68
End: 2018-10-05

## 2018-10-10 ENCOUNTER — APPOINTMENT (OUTPATIENT)
Dept: UROLOGY | Facility: CLINIC | Age: 68
End: 2018-10-10
Payer: MEDICARE

## 2018-10-10 DIAGNOSIS — R33.9 RETENTION OF URINE, UNSPECIFIED: ICD-10-CM

## 2018-10-10 PROCEDURE — 99212 OFFICE O/P EST SF 10 MIN: CPT

## 2018-10-15 PROBLEM — R33.9 URINARY RETENTION: Status: ACTIVE | Noted: 2018-07-26

## 2018-10-26 ENCOUNTER — APPOINTMENT (OUTPATIENT)
Dept: UROLOGY | Facility: CLINIC | Age: 68
End: 2018-10-26
Payer: MEDICARE

## 2018-10-26 VITALS
DIASTOLIC BLOOD PRESSURE: 72 MMHG | SYSTOLIC BLOOD PRESSURE: 148 MMHG | WEIGHT: 258 LBS | TEMPERATURE: 98.4 F | HEART RATE: 109 BPM | HEIGHT: 71 IN | BODY MASS INDEX: 36.12 KG/M2

## 2018-10-26 PROCEDURE — 51702 INSERT TEMP BLADDER CATH: CPT

## 2018-10-26 RX ORDER — GENTAMICIN SULFATE 40 MG/ML
40 INJECTION, SOLUTION INTRAMUSCULAR; INTRAVENOUS
Qty: 2 | Refills: 0 | Status: COMPLETED | COMMUNITY
Start: 2018-10-26 | End: 2018-10-26

## 2018-10-26 RX ORDER — GENTAMICIN SULFATE 40 MG/ML
40 INJECTION, SOLUTION INTRAMUSCULAR; INTRAVENOUS
Qty: 1 | Refills: 0 | Status: COMPLETED | OUTPATIENT
Start: 2018-10-26

## 2018-10-26 RX ORDER — GENTAMICIN SULFATE 40 MG/ML
40 INJECTION, SOLUTION INTRAMUSCULAR; INTRAVENOUS
Qty: 1 | Refills: 0 | Status: COMPLETED | COMMUNITY
Start: 2018-08-01 | End: 2018-08-01

## 2018-10-26 RX ADMIN — GENTAMICIN SULFATE 2 MG/ML: 40 INJECTION, SOLUTION INTRAMUSCULAR; INTRAVENOUS at 00:00

## 2018-11-08 ENCOUNTER — INPATIENT (INPATIENT)
Facility: HOSPITAL | Age: 68
LOS: 4 days | Discharge: HOME | DRG: 812 | End: 2018-11-13
Attending: INTERNAL MEDICINE | Admitting: STUDENT IN AN ORGANIZED HEALTH CARE EDUCATION/TRAINING PROGRAM
Payer: MEDICARE

## 2018-11-08 VITALS
WEIGHT: 244.93 LBS | TEMPERATURE: 98 F | RESPIRATION RATE: 18 BRPM | HEIGHT: 71 IN | HEART RATE: 92 BPM | OXYGEN SATURATION: 100 % | DIASTOLIC BLOOD PRESSURE: 67 MMHG | SYSTOLIC BLOOD PRESSURE: 128 MMHG

## 2018-11-08 LAB
APTT BLD: 25.7 SEC — LOW (ref 27.5–36.3)
INR BLD: 1.23 RATIO — HIGH (ref 0.88–1.16)
OB PNL STL: POSITIVE
PROTHROM AB SERPL-ACNC: 14.2 SEC — HIGH (ref 10–12.9)

## 2018-11-08 PROCEDURE — 99285 EMERGENCY DEPT VISIT HI MDM: CPT

## 2018-11-08 PROCEDURE — 71046 X-RAY EXAM CHEST 2 VIEWS: CPT | Mod: 26

## 2018-11-08 PROCEDURE — 93010 ELECTROCARDIOGRAM REPORT: CPT

## 2018-11-08 NOTE — ED PROVIDER NOTE - MEDICAL DECISION MAKING DETAILS
69 y/o male with a hx of blindness presents to the ED c/o chest pressure that onset today.  Plan to obtain blood work, labs, cardiac enzymes, EKG and re-eval. 69 y/o male with a hx of blindness, DM presents with CP that onset today . plan to do labs, cardiac workup and admit for ACS.

## 2018-11-08 NOTE — ED ADULT NURSE REASSESSMENT NOTE - NS ED NURSE REASSESS COMMENT FT1
Critical lab results received, Dr Shen made aware. Type and screen drawn and sent to lab, Patient refusing blood transfusion. Dr Shen aware and at bedside to speak with patient. Patient denies SOB, denies dizziness. On CM, NSR. awaiting orders. Safety maintained.

## 2018-11-08 NOTE — ED ADULT NURSE NOTE - CHPI ED NUR SYMPTOMS NEG
no fever/no shortness of breath/no vomiting/no diaphoresis/no syncope/no congestion/no dizziness/no chest pain/no chills/no back pain/no nausea

## 2018-11-08 NOTE — ED ADULT NURSE NOTE - OBJECTIVE STATEMENT
Pt reports to ED with c/o sudden onset chest pressure, denies pain, reports as squeezing pressure.  EMS gave 3 SL nitro and 324mg asa with no relief.  Alert and oriented X 3 and in no acute distress.  Has cataracts and cannot see well. Pt denies any previous episodes. Offers no other complaints. Presents with indwelling bourne catheter, with leg bag, draining yellow urine, sample sent to lab as ordered, pt reports recently being placed on Bactrim for catheter infection.  Dtr at bedside POC discussed with pt and family who verbalize understanding, MD orders initiated at this time.

## 2018-11-08 NOTE — ED ADULT TRIAGE NOTE - CHIEF COMPLAINT QUOTE
Received a call from PMD regarding low RBC count.  Pt has hx of low RBC count.  Shortly thereafter he developed chest pressure.  EMS gave 3 SL nitro and 324mg asa with no relief.  Alert and oriented X 3 and in no acute distress.  Has cataracts and cannot see well.

## 2018-11-08 NOTE — ED PROVIDER NOTE - PHYSICAL EXAMINATION
Constitutional : Appears comfortably, talking in full sentences  Head :NC AT , no swelling  Eyes :eomi, no swelling  Mouth :mm moist,  Neck : supple, trachea in midline  Chest :Jason air entry, symm chest expansion, no distress  Heart :S1 S2 distant  Abdomen :abd soft, non tender  Musc/Skel :ext no swelling, no deformity, no spine tenderness, distal pulses present  Neuro  :AAO 3 no focal deficits Constitutional : Appears comfortably, talking in full sentences  Head :NC AT , no swelling  Eyes :eomi, no swelling. Hx of vision loss.   Mouth :mm moist,  Neck : supple, trachea in midline  Chest :Jason air entry, symm chest expansion, no distress  Heart :S1 S2 distant  Abdomen :abd soft, non tender  Musc/Skel :ext no swelling, no deformity, no spine tenderness, distal pulses present  Neuro  :AAO 3 no focal deficits Constitutional : Appears comfortably, talking in full sentences  Head :NC AT , no swelling  Eyes :eomi, no swelling. Hx of vision loss. crusting around eyes  Mouth :mm moist,  Neck : supple, trachea in midline  Chest :Jason air entry, symm chest expansion, no distress. Conjunctiva pale.   Heart :S1 S2 distant. LE no edema    Abdomen :abd soft, non tender abd distended, anal tone in tact yellow stool. No hemorrhoids.  indwelling bourne, no lesions on scrotum or penis dark urine in bag  Musc/Skel :ext no swelling, no deformity, no spine tenderness, distal pulses present  Neuro  :AAO 3 no focal deficits  skin pale Constitutional : Appears comfortably, talking in full sentences  Head :NC AT , no swelling  Eyes :eomi, no swelling. Hx of vision loss. crusting around eyes  Mouth :mm moist,  Neck : supple, trachea in midline  Chest :Jason air entry, symm chest expansion, no distress. Conjunctiva pale.   Heart :S1 S2 distant. LE no edema    Abdomen :abd soft, non tender abd distended, anal tone in tact yellow stool. No hemorrhoids.  indwelling bourne, no lesions on scrotum or penis dark urine in bag  Musc/Skel :ext no swelling, no deformity, no spine tenderness, distal pulses present  Neuro  :AAO 3 no focal deficits  skin pale and dry Constitutional : Appears comfortably, talking in full sentences  Head :NC AT , no swelling  Eyes :no swelling. Hx of vision loss. crusting around eyes  Conjunctiva pale.   Mouth :mm dry, poor dentition, pale  Neck : supple, trachea in midline  Chest :Jason air entry, symm chest expansion, no distress.   Heart :S1 S2 distant. LE no edema    Abdomen :abd obese non tender abd distended,   anal tone in tact yellow stool. No hemorrhoids.  indwelling bourne, no lesions on scrotum or penis dark urine in bag  Musc/Skel :ext no swelling, no deformity, no spine tenderness, distal pulses present  gen muscle loss, symm muscle wasting, palms pale  Neuro  :AAO 3 no focal deficits  skin pale and dry

## 2018-11-08 NOTE — ED ADULT NURSE NOTE - NSIMPLEMENTINTERV_GEN_ALL_ED
Implemented All Fall Risk Interventions:  [place] to call system. Call bell, personal items and telephone within reach. Instruct patient to call for assistance. Room bathroom lighting operational. Non-slip footwear when patient is off stretcher. Physically safe environment: no spills, clutter or unnecessary equipment. Stretcher in lowest position, wheels locked, appropriate side rails in place. Provide visual cue, wrist band, yellow gown, etc. Monitor gait and stability. Monitor for mental status changes and reorient to person, place, and time. Review medications for side effects contributing to fall risk. Reinforce activity limits and safety measures with patient and family.

## 2018-11-08 NOTE — ED PROVIDER NOTE - NS ED ROS FT
no weight change, no fever or chills  no rash, no bruises  no visual changes no eye discharge  no cough cold or congestion,   no sob, (+) chest pain  no orthopnea, no pnd  no abd pain, no n/v/d  no hematuria, no change in urinary habits  no joint pain, no deformity  no headache, no paresthesia + weight change, no fever or chills  no recent travel, + recent abox, no sick contacts  + recent change in medications  no rash, no bruises  vison loss can see light  + eye discharge  no cough cold or congestion,   no sob, no chest pain  no orthopnea, no pnd  no abd pain, no n/v/d  gets help from daughter  indewsuzanne bourne  no joint pain, no deformity  no headache, no paresthesia

## 2018-11-08 NOTE — ED PROVIDER NOTE - PROGRESS NOTE DETAILS
pt's daughter at bedside. Will discuss case with pt. patient refusing transfusion, plan to admit, spoke to hospitalist

## 2018-11-08 NOTE — ED PROVIDER NOTE - OBJECTIVE STATEMENT
69 y/o male with a hx of blindness presents to the ED c/o chest pressure that onset today. Pt describes that his pain feels like a squeezing CP. This is the first time he has had pain like this before. he has been taking gasex for his CP, but that pain feels different. Never had similar pain before. Denies abd pain, fever, chills, N/V/D, being on blood thinners.   	He has recently lost about 11 lbs. He took an abx yesterday for his infection in his bourne cath since 8 months ago after he was diagnosed with an enlarged prostate. He had a recent routine stress test and endoscopy which was normal. 69 y/o male with a hx of blindness presents to the ED c/o chest pressure that onset today. Pt describes that his pain feels like a squeezing CP. This is the first time he has had pain like this before. he has been taking gasex for his CP, but that pain feels different. Never had similar pain before. Denies abd pain, fever, chills, N/V/D, being on blood thinners.   	He has recently lost about 11 lbs. He took an abx yesterday for his infection in his bourne cath since 8 months ago after he was diagnosed with an enlarged prostate. He had a recent routine stress test and endoscopy which was normal. Lost 11 lbs recently 69 y/o male with a hx of blindness presents to the ED with c/o chest pressure, left sided that started  today. Pt describes that his pain as squeezing. patient has has chest pain before but this is different and is his first episode, previosuly he has taken gasos with some improvement. pain was intermittent, gradual, no aggravtaing factors, Denies abd pain, fever, chills, N/V/D, being on blood thinners.   	He has recently lost about 11 lbs. He took an abx yesterday for his infection in his bourne cath since 8 months ago after he was diagnosed with an enlarged prostate. He had a recent routine stress test and endoscopy which was normal. Lost 11 lbs recently

## 2018-11-09 DIAGNOSIS — D64.9 ANEMIA, UNSPECIFIED: ICD-10-CM

## 2018-11-09 DIAGNOSIS — E11.21 TYPE 2 DIABETES MELLITUS WITH DIABETIC NEPHROPATHY: ICD-10-CM

## 2018-11-09 DIAGNOSIS — H26.493 OTHER SECONDARY CATARACT, BILATERAL: ICD-10-CM

## 2018-11-09 DIAGNOSIS — R53.81 OTHER MALAISE: ICD-10-CM

## 2018-11-09 DIAGNOSIS — D50.0 IRON DEFICIENCY ANEMIA SECONDARY TO BLOOD LOSS (CHRONIC): ICD-10-CM

## 2018-11-09 DIAGNOSIS — Z51.5 ENCOUNTER FOR PALLIATIVE CARE: ICD-10-CM

## 2018-11-09 DIAGNOSIS — L03.90 CELLULITIS, UNSPECIFIED: ICD-10-CM

## 2018-11-09 DIAGNOSIS — N18.3 CHRONIC KIDNEY DISEASE, STAGE 3 (MODERATE): ICD-10-CM

## 2018-11-09 DIAGNOSIS — R33.9 RETENTION OF URINE, UNSPECIFIED: ICD-10-CM

## 2018-11-09 DIAGNOSIS — R07.89 OTHER CHEST PAIN: ICD-10-CM

## 2018-11-09 DIAGNOSIS — I24.9 ACUTE ISCHEMIC HEART DISEASE, UNSPECIFIED: ICD-10-CM

## 2018-11-09 LAB
ALBUMIN SERPL ELPH-MCNC: 3.9 G/DL — SIGNIFICANT CHANGE UP (ref 3.3–5.2)
ALBUMIN SERPL ELPH-MCNC: 3.9 G/DL — SIGNIFICANT CHANGE UP (ref 3.3–5.2)
ALP SERPL-CCNC: 62 U/L — SIGNIFICANT CHANGE UP (ref 40–120)
ALP SERPL-CCNC: 67 U/L — SIGNIFICANT CHANGE UP (ref 40–120)
ALT FLD-CCNC: 10 U/L — SIGNIFICANT CHANGE UP
ALT FLD-CCNC: 10 U/L — SIGNIFICANT CHANGE UP
ANION GAP SERPL CALC-SCNC: 13 MMOL/L — SIGNIFICANT CHANGE UP (ref 5–17)
ANION GAP SERPL CALC-SCNC: 14 MMOL/L — SIGNIFICANT CHANGE UP (ref 5–17)
ANISOCYTOSIS BLD QL: SLIGHT — SIGNIFICANT CHANGE UP
APPEARANCE UR: CLEAR — SIGNIFICANT CHANGE UP
AST SERPL-CCNC: 24 U/L — SIGNIFICANT CHANGE UP
AST SERPL-CCNC: 27 U/L — SIGNIFICANT CHANGE UP
BASOPHILS # BLD AUTO: 0 K/UL — SIGNIFICANT CHANGE UP (ref 0–0.2)
BASOPHILS NFR BLD AUTO: 0.8 % — SIGNIFICANT CHANGE UP (ref 0–2)
BILIRUB SERPL-MCNC: 0.4 MG/DL — SIGNIFICANT CHANGE UP (ref 0.4–2)
BILIRUB SERPL-MCNC: 0.5 MG/DL — SIGNIFICANT CHANGE UP (ref 0.4–2)
BILIRUB UR-MCNC: NEGATIVE — SIGNIFICANT CHANGE UP
BLD GP AB SCN SERPL QL: SIGNIFICANT CHANGE UP
BUN SERPL-MCNC: 20 MG/DL — SIGNIFICANT CHANGE UP (ref 8–20)
BUN SERPL-MCNC: 24 MG/DL — HIGH (ref 8–20)
CALCIUM SERPL-MCNC: 8.5 MG/DL — LOW (ref 8.6–10.2)
CALCIUM SERPL-MCNC: 8.6 MG/DL — SIGNIFICANT CHANGE UP (ref 8.6–10.2)
CHLORIDE SERPL-SCNC: 104 MMOL/L — SIGNIFICANT CHANGE UP (ref 98–107)
CHLORIDE SERPL-SCNC: 107 MMOL/L — SIGNIFICANT CHANGE UP (ref 98–107)
CO2 SERPL-SCNC: 20 MMOL/L — LOW (ref 22–29)
CO2 SERPL-SCNC: 20 MMOL/L — LOW (ref 22–29)
COLOR SPEC: YELLOW — SIGNIFICANT CHANGE UP
CREAT SERPL-MCNC: 2.24 MG/DL — HIGH (ref 0.5–1.3)
CREAT SERPL-MCNC: 2.49 MG/DL — HIGH (ref 0.5–1.3)
DACRYOCYTES BLD QL SMEAR: SLIGHT — SIGNIFICANT CHANGE UP
DIFF PNL FLD: NEGATIVE — SIGNIFICANT CHANGE UP
ELLIPTOCYTES BLD QL SMEAR: SLIGHT — SIGNIFICANT CHANGE UP
EOSINOPHIL # BLD AUTO: 0.1 K/UL — SIGNIFICANT CHANGE UP (ref 0–0.5)
EOSINOPHIL NFR BLD AUTO: 1.5 % — SIGNIFICANT CHANGE UP (ref 0–6)
FERRITIN SERPL-MCNC: 10 NG/ML — LOW (ref 30–400)
GLUCOSE BLDC GLUCOMTR-MCNC: 106 MG/DL — HIGH (ref 70–99)
GLUCOSE BLDC GLUCOMTR-MCNC: 108 MG/DL — HIGH (ref 70–99)
GLUCOSE BLDC GLUCOMTR-MCNC: 117 MG/DL — HIGH (ref 70–99)
GLUCOSE BLDC GLUCOMTR-MCNC: 146 MG/DL — HIGH (ref 70–99)
GLUCOSE SERPL-MCNC: 119 MG/DL — HIGH (ref 70–115)
GLUCOSE SERPL-MCNC: 156 MG/DL — HIGH (ref 70–115)
GLUCOSE UR QL: NEGATIVE MG/DL — SIGNIFICANT CHANGE UP
HCT VFR BLD CALC: 15.5 % — CRITICAL LOW (ref 42–52)
HCT VFR BLD CALC: 16.3 % — CRITICAL LOW (ref 42–52)
HGB BLD-MCNC: 4.7 G/DL — CRITICAL LOW (ref 14–18)
HGB BLD-MCNC: 5 G/DL — CRITICAL LOW (ref 14–18)
HYPOCHROMIA BLD QL: SIGNIFICANT CHANGE UP
IRON SATN MFR SERPL: 103 UG/DL — SIGNIFICANT CHANGE UP (ref 59–158)
IRON SATN MFR SERPL: 25 % — SIGNIFICANT CHANGE UP (ref 16–55)
KETONES UR-MCNC: NEGATIVE — SIGNIFICANT CHANGE UP
LEUKOCYTE ESTERASE UR-ACNC: NEGATIVE — SIGNIFICANT CHANGE UP
LIDOCAIN IGE QN: 43 U/L — SIGNIFICANT CHANGE UP (ref 22–51)
LYMPHOCYTES # BLD AUTO: 1.2 K/UL — SIGNIFICANT CHANGE UP (ref 1–4.8)
LYMPHOCYTES # BLD AUTO: 20.6 % — SIGNIFICANT CHANGE UP (ref 20–55)
MAGNESIUM SERPL-MCNC: 2 MG/DL — SIGNIFICANT CHANGE UP (ref 1.6–2.6)
MCHC RBC-ENTMCNC: 19 PG — LOW (ref 27–31)
MCHC RBC-ENTMCNC: 19.6 PG — LOW (ref 27–31)
MCHC RBC-ENTMCNC: 30.3 G/DL — LOW (ref 32–36)
MCHC RBC-ENTMCNC: 30.7 G/DL — LOW (ref 32–36)
MCV RBC AUTO: 62.8 FL — LOW (ref 80–94)
MCV RBC AUTO: 63.9 FL — LOW (ref 80–94)
MICROCYTES BLD QL: SLIGHT — SIGNIFICANT CHANGE UP
MONOCYTES # BLD AUTO: 1 K/UL — HIGH (ref 0–0.8)
MONOCYTES NFR BLD AUTO: 15.8 % — HIGH (ref 3–10)
NEUTROPHILS # BLD AUTO: 3.7 K/UL — SIGNIFICANT CHANGE UP (ref 1.8–8)
NEUTROPHILS NFR BLD AUTO: 61 % — SIGNIFICANT CHANGE UP (ref 37–73)
NITRITE UR-MCNC: NEGATIVE — SIGNIFICANT CHANGE UP
NT-PROBNP SERPL-SCNC: 258 PG/ML — SIGNIFICANT CHANGE UP (ref 0–300)
PH UR: 7 — SIGNIFICANT CHANGE UP (ref 5–8)
PLAT MORPH BLD: NORMAL — SIGNIFICANT CHANGE UP
PLATELET # BLD AUTO: 220 K/UL — SIGNIFICANT CHANGE UP (ref 150–400)
PLATELET # BLD AUTO: 262 K/UL — SIGNIFICANT CHANGE UP (ref 150–400)
POIKILOCYTOSIS BLD QL AUTO: SLIGHT — SIGNIFICANT CHANGE UP
POLYCHROMASIA BLD QL SMEAR: SLIGHT — SIGNIFICANT CHANGE UP
POTASSIUM SERPL-MCNC: 4.7 MMOL/L — SIGNIFICANT CHANGE UP (ref 3.5–5.3)
POTASSIUM SERPL-MCNC: 4.8 MMOL/L — SIGNIFICANT CHANGE UP (ref 3.5–5.3)
POTASSIUM SERPL-SCNC: 4.7 MMOL/L — SIGNIFICANT CHANGE UP (ref 3.5–5.3)
POTASSIUM SERPL-SCNC: 4.8 MMOL/L — SIGNIFICANT CHANGE UP (ref 3.5–5.3)
PROT SERPL-MCNC: 6.7 G/DL — SIGNIFICANT CHANGE UP (ref 6.6–8.7)
PROT SERPL-MCNC: 6.9 G/DL — SIGNIFICANT CHANGE UP (ref 6.6–8.7)
PROT UR-MCNC: NEGATIVE MG/DL — SIGNIFICANT CHANGE UP
RBC # BLD: 2.47 M/UL — LOW (ref 4.6–6.2)
RBC # BLD: 2.55 M/UL — LOW (ref 4.6–6.2)
RBC # FLD: 17.6 % — HIGH (ref 11–15.6)
RBC # FLD: 17.8 % — HIGH (ref 11–15.6)
RBC BLD AUTO: ABNORMAL
SODIUM SERPL-SCNC: 137 MMOL/L — SIGNIFICANT CHANGE UP (ref 135–145)
SODIUM SERPL-SCNC: 141 MMOL/L — SIGNIFICANT CHANGE UP (ref 135–145)
SP GR SPEC: 1.01 — SIGNIFICANT CHANGE UP (ref 1.01–1.02)
STOMATOCYTES BLD QL SMEAR: PRESENT — SIGNIFICANT CHANGE UP
TIBC SERPL-MCNC: 409 UG/DL — SIGNIFICANT CHANGE UP (ref 220–430)
TRANSFERRIN SERPL-MCNC: 286 MG/DL — SIGNIFICANT CHANGE UP (ref 180–329)
TROPONIN T SERPL-MCNC: 0.07 NG/ML — HIGH (ref 0–0.06)
TSH SERPL-MCNC: 2.24 UIU/ML — SIGNIFICANT CHANGE UP (ref 0.27–4.2)
TYPE + AB SCN PNL BLD: SIGNIFICANT CHANGE UP
UROBILINOGEN FLD QL: NEGATIVE MG/DL — SIGNIFICANT CHANGE UP
WBC # BLD: 5.5 K/UL — SIGNIFICANT CHANGE UP (ref 4.8–10.8)
WBC # BLD: 6 K/UL — SIGNIFICANT CHANGE UP (ref 4.8–10.8)
WBC # FLD AUTO: 5.5 K/UL — SIGNIFICANT CHANGE UP (ref 4.8–10.8)
WBC # FLD AUTO: 6 K/UL — SIGNIFICANT CHANGE UP (ref 4.8–10.8)

## 2018-11-09 PROCEDURE — 12345: CPT | Mod: NC,GC

## 2018-11-09 PROCEDURE — 99233 SBSQ HOSP IP/OBS HIGH 50: CPT

## 2018-11-09 PROCEDURE — 99497 ADVNCD CARE PLAN 30 MIN: CPT | Mod: 25

## 2018-11-09 PROCEDURE — 99223 1ST HOSP IP/OBS HIGH 75: CPT

## 2018-11-09 PROCEDURE — 99497 ADVNCD CARE PLAN 30 MIN: CPT | Mod: GC

## 2018-11-09 RX ORDER — SACCHAROMYCES BOULARDII 250 MG
250 POWDER IN PACKET (EA) ORAL
Qty: 0 | Refills: 0 | Status: DISCONTINUED | OUTPATIENT
Start: 2018-11-09 | End: 2018-11-13

## 2018-11-09 RX ORDER — FOLIC ACID 0.8 MG
1 TABLET ORAL DAILY
Qty: 0 | Refills: 0 | Status: DISCONTINUED | OUTPATIENT
Start: 2018-11-09 | End: 2018-11-13

## 2018-11-09 RX ORDER — IRON SUCROSE 20 MG/ML
200 INJECTION, SOLUTION INTRAVENOUS ONCE
Qty: 0 | Refills: 0 | Status: COMPLETED | OUTPATIENT
Start: 2018-11-09 | End: 2018-11-09

## 2018-11-09 RX ORDER — SODIUM CHLORIDE 9 MG/ML
1000 INJECTION INTRAMUSCULAR; INTRAVENOUS; SUBCUTANEOUS
Qty: 0 | Refills: 0 | Status: DISCONTINUED | OUTPATIENT
Start: 2018-11-09 | End: 2018-11-10

## 2018-11-09 RX ORDER — ONDANSETRON 8 MG/1
4 TABLET, FILM COATED ORAL EVERY 6 HOURS
Qty: 0 | Refills: 0 | Status: DISCONTINUED | OUTPATIENT
Start: 2018-11-09 | End: 2018-11-13

## 2018-11-09 RX ORDER — METOPROLOL TARTRATE 50 MG
25 TABLET ORAL
Qty: 0 | Refills: 0 | Status: DISCONTINUED | OUTPATIENT
Start: 2018-11-09 | End: 2018-11-13

## 2018-11-09 RX ORDER — DEXTROSE 50 % IN WATER 50 %
15 SYRINGE (ML) INTRAVENOUS ONCE
Qty: 0 | Refills: 0 | Status: DISCONTINUED | OUTPATIENT
Start: 2018-11-09 | End: 2018-11-13

## 2018-11-09 RX ORDER — SODIUM CHLORIDE 9 MG/ML
1000 INJECTION, SOLUTION INTRAVENOUS
Qty: 0 | Refills: 0 | Status: DISCONTINUED | OUTPATIENT
Start: 2018-11-09 | End: 2018-11-13

## 2018-11-09 RX ORDER — GLUCAGON INJECTION, SOLUTION 0.5 MG/.1ML
1 INJECTION, SOLUTION SUBCUTANEOUS ONCE
Qty: 0 | Refills: 0 | Status: DISCONTINUED | OUTPATIENT
Start: 2018-11-09 | End: 2018-11-13

## 2018-11-09 RX ORDER — PANTOPRAZOLE SODIUM 20 MG/1
8 TABLET, DELAYED RELEASE ORAL
Qty: 80 | Refills: 0 | Status: DISCONTINUED | OUTPATIENT
Start: 2018-11-09 | End: 2018-11-10

## 2018-11-09 RX ORDER — ASCORBIC ACID 60 MG
500 TABLET,CHEWABLE ORAL DAILY
Qty: 0 | Refills: 0 | Status: DISCONTINUED | OUTPATIENT
Start: 2018-11-09 | End: 2018-11-13

## 2018-11-09 RX ORDER — DEXTROSE 50 % IN WATER 50 %
12.5 SYRINGE (ML) INTRAVENOUS ONCE
Qty: 0 | Refills: 0 | Status: DISCONTINUED | OUTPATIENT
Start: 2018-11-09 | End: 2018-11-13

## 2018-11-09 RX ORDER — PANTOPRAZOLE SODIUM 20 MG/1
80 TABLET, DELAYED RELEASE ORAL ONCE
Qty: 0 | Refills: 0 | Status: COMPLETED | OUTPATIENT
Start: 2018-11-09 | End: 2018-11-09

## 2018-11-09 RX ORDER — IRON SUCROSE 20 MG/ML
100 INJECTION, SOLUTION INTRAVENOUS
Qty: 0 | Refills: 0 | Status: DISCONTINUED | OUTPATIENT
Start: 2018-11-09 | End: 2018-11-09

## 2018-11-09 RX ORDER — ERYTHROPOIETIN 10000 [IU]/ML
40000 INJECTION, SOLUTION INTRAVENOUS; SUBCUTANEOUS
Qty: 0 | Refills: 0 | Status: DISCONTINUED | OUTPATIENT
Start: 2018-11-09 | End: 2018-11-13

## 2018-11-09 RX ORDER — TAMSULOSIN HYDROCHLORIDE 0.4 MG/1
0.8 CAPSULE ORAL AT BEDTIME
Qty: 0 | Refills: 0 | Status: DISCONTINUED | OUTPATIENT
Start: 2018-11-09 | End: 2018-11-13

## 2018-11-09 RX ORDER — SODIUM CHLORIDE 9 MG/ML
3 INJECTION INTRAMUSCULAR; INTRAVENOUS; SUBCUTANEOUS EVERY 8 HOURS
Qty: 0 | Refills: 0 | Status: DISCONTINUED | OUTPATIENT
Start: 2018-11-09 | End: 2018-11-13

## 2018-11-09 RX ORDER — INSULIN LISPRO 100/ML
VIAL (ML) SUBCUTANEOUS
Qty: 0 | Refills: 0 | Status: DISCONTINUED | OUTPATIENT
Start: 2018-11-09 | End: 2018-11-13

## 2018-11-09 RX ORDER — PREGABALIN 225 MG/1
1000 CAPSULE ORAL DAILY
Qty: 0 | Refills: 0 | Status: DISCONTINUED | OUTPATIENT
Start: 2018-11-09 | End: 2018-11-13

## 2018-11-09 RX ADMIN — IRON SUCROSE 110 MILLIGRAM(S): 20 INJECTION, SOLUTION INTRAVENOUS at 12:25

## 2018-11-09 RX ADMIN — PANTOPRAZOLE SODIUM 10 MG/HR: 20 TABLET, DELAYED RELEASE ORAL at 06:19

## 2018-11-09 RX ADMIN — Medication 250 MILLIGRAM(S): at 17:17

## 2018-11-09 RX ADMIN — ERYTHROPOIETIN 40000 UNIT(S): 10000 INJECTION, SOLUTION INTRAVENOUS; SUBCUTANEOUS at 03:40

## 2018-11-09 RX ADMIN — SODIUM CHLORIDE 3 MILLILITER(S): 9 INJECTION INTRAMUSCULAR; INTRAVENOUS; SUBCUTANEOUS at 05:09

## 2018-11-09 RX ADMIN — IRON SUCROSE 210 MILLIGRAM(S): 20 INJECTION, SOLUTION INTRAVENOUS at 03:40

## 2018-11-09 RX ADMIN — Medication 1 TABLET(S): at 17:16

## 2018-11-09 RX ADMIN — Medication 25 MILLIGRAM(S): at 17:16

## 2018-11-09 RX ADMIN — Medication 1 MILLIGRAM(S): at 03:30

## 2018-11-09 RX ADMIN — PREGABALIN 1000 MICROGRAM(S): 225 CAPSULE ORAL at 03:30

## 2018-11-09 RX ADMIN — PANTOPRAZOLE SODIUM 80 MILLIGRAM(S): 20 TABLET, DELAYED RELEASE ORAL at 03:29

## 2018-11-09 RX ADMIN — Medication 500 MILLIGRAM(S): at 03:29

## 2018-11-09 RX ADMIN — SODIUM CHLORIDE 3 MILLILITER(S): 9 INJECTION INTRAMUSCULAR; INTRAVENOUS; SUBCUTANEOUS at 12:13

## 2018-11-09 RX ADMIN — Medication 1 TABLET(S): at 06:20

## 2018-11-09 RX ADMIN — PANTOPRAZOLE SODIUM 10 MG/HR: 20 TABLET, DELAYED RELEASE ORAL at 12:25

## 2018-11-09 RX ADMIN — SODIUM CHLORIDE 75 MILLILITER(S): 9 INJECTION INTRAMUSCULAR; INTRAVENOUS; SUBCUTANEOUS at 03:42

## 2018-11-09 RX ADMIN — TAMSULOSIN HYDROCHLORIDE 0.8 MILLIGRAM(S): 0.4 CAPSULE ORAL at 21:23

## 2018-11-09 RX ADMIN — SODIUM CHLORIDE 75 MILLILITER(S): 9 INJECTION INTRAMUSCULAR; INTRAVENOUS; SUBCUTANEOUS at 12:26

## 2018-11-09 RX ADMIN — SODIUM CHLORIDE 3 MILLILITER(S): 9 INJECTION INTRAMUSCULAR; INTRAVENOUS; SUBCUTANEOUS at 21:20

## 2018-11-09 NOTE — H&P ADULT - PMH
Chronic anemia    CKD (chronic kidney disease) stage 3, GFR 30-59 ml/min    Diabetes    Other secondary cataract of both eyes    Urinary retention

## 2018-11-09 NOTE — CONSULT NOTE ADULT - SUBJECTIVE AND OBJECTIVE BOX
HPI:  67 y/o male with history of DM-2 not on meds, legally blind due to B/L Cataracts for which he has declined surgery, Urinary retention with chronic bourne and chronic suppressive abx therapy with last bourne change on , chronic anemia with baseline Hbg of 6-7 based on o/p records, CKD-3 Patient saw his PMD yesterday for his "routine" f/u appt. Denies any other new symptoms. He was sent for blood work and was noted to have a hbg of 5.0 Patient unable to tell if he is having any dark BM or BRBPR as he is legally blind. He got a call from his PMD notifying him of this result and was told to call EMS to go to the nearest ER. As per EMS sheets patient got anxious and started have chest pressure when they arrived. He received aspirin 325 x 1 and SL nitro spray with little relief. His symptoms resolved by the time he got to the ED. EKG in the field did not show STEMI, and 2 subsequent ones in ED where negative for acute changes. His Trop. is 07 and his Hbg is noted ot be 4.9. Stool was brown but occult positive. His vitals are stable. Currently he has no complaints. Patient adamantly declining PRBC transfusion. Willing to proceed with bloodless medicine protocol. (2018 02:31)<end of copied text>    PERTINENT PMH REVIEWED: Yes   PAST MEDICAL & SURGICAL HISTORY:  Urinary retention  Other secondary cataract of both eyes  CKD (chronic kidney disease) stage 3, GFR 30-59 ml/min  Chronic anemia  Diabetes  No significant past surgical history      SOCIAL HISTORY:                                     Admitted from:  home    Surrogate/HCP/Guardian: Phone#: Amelia (Daughter) John [phone]    FAMILY HISTORY:  Family history of diabetes mellitus      Baseline ADLs (prior to admission):  Independent    Allergies  amoxicillin (Rash)  Intolerances      Present Symptoms:   Dyspnea: 0   Nausea/Vomiting: No  Anxiety:  No  Depression: No  Fatigue: No  Loss of appetite: No    Pain: No pain            Character-            Duration-            Effect-            Factors-            Frequency-            Location-            Severity-    Review of Systems: Reviewed                     Negative: Denies pain  All others negative    MEDICATIONS  (STANDING):  ascorbic acid 500 milliGRAM(s) Oral daily  cyanocobalamin 1000 MICROGram(s) Oral daily  dextrose 5%. 1000 milliLiter(s) (50 mL/Hr) IV Continuous <Continuous>  dextrose 50% Injectable 12.5 Gram(s) IV Push once  epoetin daniel Injectable 89544 Unit(s) SubCutaneous <User Schedule>  folic acid 1 milliGRAM(s) Oral daily  insulin lispro (HumaLOG) corrective regimen sliding scale   SubCutaneous Before meals and at bedtime  iron sucrose IVPB 200 milliGRAM(s) IV Intermittent once  multivitamin 1 Tablet(s) Oral daily  pantoprazole Infusion 8 mG/Hr (10 mL/Hr) IV Continuous <Continuous>  saccharomyces boulardii 250 milliGRAM(s) Oral two times a day  sodium chloride 0.9% lock flush 3 milliLiter(s) IV Push every 8 hours  sodium chloride 0.9%. 1000 milliLiter(s) (75 mL/Hr) IV Continuous <Continuous>  tamsulosin 0.8 milliGRAM(s) Oral at bedtime  trimethoprim  160 mG/sulfamethoxazole 800 mG 1 Tablet(s) Oral two times a day    MEDICATIONS  (PRN):  dextrose 40% Gel 15 Gram(s) Oral once PRN Blood Glucose LESS THAN 70 milliGRAM(s)/deciliter  glucagon  Injectable 1 milliGRAM(s) IntraMuscular once PRN Glucose LESS THAN 70 milligrams/deciliter  ondansetron Injectable 4 milliGRAM(s) IV Push every 6 hours PRN Nausea      PHYSICAL EXAM:    Vital Signs Last 24 Hrs  T(C): 36.8 (2018 07:54), Max: 36.8 (2018 19:26)  T(F): 98.3 (2018 07:54), Max: 98.3 (2018 19:26)  HR: 74 (2018 07:54) (73 - 92)  BP: 130/70 (2018 07:54) (119/63 - 137/73)  BP(mean): 92 (2018 02:31) (92 - 92)  RR: 18 (2018 07:54) (18 - 18)  SpO2: 98% (2018 07:54) (97% - 100%)    General: alert  oriented x ___4_     HEENT: Legally blind    Lungs: comfortable    CV: normal      GI: admitted for gi bleed - stool occult +, stool brown , no pain    : normal    MSK:  edema BLE +2    Skin: normal , flakey    LABS:                        4.7    6.0   )-----------( 262      ( 2018 22:18 )             15.5     11    137  |  104  |  24.0<H>  ----------------------------<  156<H>  4.8   |  20.0<L>  |  2.49<H>    Ca    8.5<L>      2018 22:18  Mg     2.0         TPro  6.9  /  Alb  3.9  /  TBili  0.4  /  DBili  x   /  AST  24  /  ALT  10  /  AlkPhos  62      PT/INR - ( 2018 22:18 )   PT: 14.2 sec;   INR: 1.23 ratio         PTT - ( 2018 22:18 )  PTT:25.7 sec  Urinalysis Basic - ( 2018 22:39 )    Color: Yellow / Appearance: Clear / S.010 / pH: x  Gluc: x / Ketone: Negative  / Bili: Negative / Urobili: Negative mg/dL   Blood: x / Protein: Negative mg/dL / Nitrite: Negative   Leuk Esterase: Negative / RBC: x / WBC x   Sq Epi: x / Non Sq Epi: x / Bacteria: x      I&O's Summary    2018 07:01  -  2018 07:00  --------------------------------------------------------  IN: 0 mL / OUT: 1000 mL / NET: -1000 mL        RADIOLOGY & ADDITIONAL STUDIES:  CXR 18  IMPRESSION:  No acute cardiopulmonary disease process.    ADVANCE DIRECTIVES:   FULL CODE This is a 68 year old male PMHx chronic anemia, CKD, DM, admitted for blood work noted to be hgb 5.0. Stool occult blood positive. This is a Palliative care Consult.     HPI:  67 y/o male with history of DM-2 not on meds, legally blind due to B/L Cataracts for which he has declined surgery, Urinary retention with chronic bourne and chronic suppressive abx therapy with last bourne change on , chronic anemia with baseline Hbg of 6-7 based on o/p records, CKD-3 Patient saw his PMD yesterday for his "routine" f/u appt. Denies any other new symptoms. He was sent for blood work and was noted to have a hbg of 5.0 Patient unable to tell if he is having any dark BM or BRBPR as he is legally blind. He got a call from his PMD notifying him of this result and was told to call EMS to go to the nearest ER. As per EMS sheets patient got anxious and started have chest pressure when they arrived. He received aspirin 325 x 1 and SL nitro spray with little relief. His symptoms resolved by the time he got to the ED. EKG in the field did not show STEMI, and 2 subsequent ones in ED where negative for acute changes. His Trop. is 07 and his Hbg is noted ot be 4.9. Stool was brown but occult positive. His vitals are stable. Currently he has no complaints. Patient adamantly declining PRBC transfusion. Willing to proceed with bloodless medicine protocol. (2018 02:31)<end of copied text>    PERTINENT PMH REVIEWED: Yes   PAST MEDICAL & SURGICAL HISTORY:  Urinary retention  Other secondary cataract of both eyes  CKD (chronic kidney disease) stage 3, GFR 30-59 ml/min  Chronic anemia  Diabetes  No significant past surgical history      SOCIAL HISTORY:                                     Admitted from:  home    Surrogate/HCP/Guardian: Phone#: Amelia (Daughter) John [phone]    FAMILY HISTORY:  Family history of diabetes mellitus      Baseline ADLs (prior to admission):  Independent    Allergies  amoxicillin (Rash)  Intolerances      Present Symptoms:   Dyspnea: 0   Nausea/Vomiting: No  Anxiety:  No  Depression: No  Fatigue: No  Loss of appetite: No    Pain: No pain            Character-            Duration-            Effect-            Factors-            Frequency-            Location-            Severity-    Review of Systems: Reviewed                     Negative: Denies pain  All others negative    MEDICATIONS  (STANDING):  ascorbic acid 500 milliGRAM(s) Oral daily  cyanocobalamin 1000 MICROGram(s) Oral daily  dextrose 5%. 1000 milliLiter(s) (50 mL/Hr) IV Continuous <Continuous>  dextrose 50% Injectable 12.5 Gram(s) IV Push once  epoetin daniel Injectable 79621 Unit(s) SubCutaneous <User Schedule>  folic acid 1 milliGRAM(s) Oral daily  insulin lispro (HumaLOG) corrective regimen sliding scale   SubCutaneous Before meals and at bedtime  iron sucrose IVPB 200 milliGRAM(s) IV Intermittent once  multivitamin 1 Tablet(s) Oral daily  pantoprazole Infusion 8 mG/Hr (10 mL/Hr) IV Continuous <Continuous>  saccharomyces boulardii 250 milliGRAM(s) Oral two times a day  sodium chloride 0.9% lock flush 3 milliLiter(s) IV Push every 8 hours  sodium chloride 0.9%. 1000 milliLiter(s) (75 mL/Hr) IV Continuous <Continuous>  tamsulosin 0.8 milliGRAM(s) Oral at bedtime  trimethoprim  160 mG/sulfamethoxazole 800 mG 1 Tablet(s) Oral two times a day    MEDICATIONS  (PRN):  dextrose 40% Gel 15 Gram(s) Oral once PRN Blood Glucose LESS THAN 70 milliGRAM(s)/deciliter  glucagon  Injectable 1 milliGRAM(s) IntraMuscular once PRN Glucose LESS THAN 70 milligrams/deciliter  ondansetron Injectable 4 milliGRAM(s) IV Push every 6 hours PRN Nausea      PHYSICAL EXAM:    Vital Signs Last 24 Hrs  T(C): 36.8 (2018 07:54), Max: 36.8 (2018 19:26)  T(F): 98.3 (2018 07:54), Max: 98.3 (2018 19:26)  HR: 74 (2018 07:54) (73 - 92)  BP: 130/70 (2018 07:54) (119/63 - 137/73)  BP(mean): 92 (2018 02:31) (92 - 92)  RR: 18 (2018 07:54) (18 - 18)  SpO2: 98% (2018 07:54) (97% - 100%)    General: alert  oriented x ___4_     HEENT: Legally blind    Lungs: comfortable    CV: normal      GI: admitted for gi bleed - stool occult +, stool brown , no pain    : normal    MSK:  edema BLE +2    Skin: normal , flakey    LABS:                        4.7    6.0   )-----------( 262      ( 2018 22:18 )             15.5     11    137  |  104  |  24.0<H>  ----------------------------<  156<H>  4.8   |  20.0<L>  |  2.49<H>    Ca    8.5<L>      2018 22:18  Mg     2.0         TPro  6.9  /  Alb  3.9  /  TBili  0.4  /  DBili  x   /  AST  24  /  ALT  10  /  AlkPhos  62  -    PT/INR - ( 2018 22:18 )   PT: 14.2 sec;   INR: 1.23 ratio         PTT - ( 2018 22:18 )  PTT:25.7 sec  Urinalysis Basic - ( 2018 22:39 )    Color: Yellow / Appearance: Clear / S.010 / pH: x  Gluc: x / Ketone: Negative  / Bili: Negative / Urobili: Negative mg/dL   Blood: x / Protein: Negative mg/dL / Nitrite: Negative   Leuk Esterase: Negative / RBC: x / WBC x   Sq Epi: x / Non Sq Epi: x / Bacteria: x      I&O's Summary    2018 07:01  -  2018 07:00  --------------------------------------------------------  IN: 0 mL / OUT: 1000 mL / NET: -1000 mL        RADIOLOGY & ADDITIONAL STUDIES:  CXR 18  IMPRESSION:  No acute cardiopulmonary disease process.    ADVANCE DIRECTIVES:   FULL CODE

## 2018-11-09 NOTE — CONSULT NOTE ADULT - ASSESSMENT
This is a 68 year old male legally blind sent to [facility] for low H&H
CKD Stage 4 (Fluctuates between 3b and 4 based on CKD-EPI calculation for GFR)  Severe Anemia  GIB  DM2    -Renal indices are stable at baseline; electrolytes are stable; No acidosis; No evidence of Uremia  -Agree with Procrit and IV Iron  -Check iron panel  -The patient refuses blood transfusions despite counseling  -GI eval  -Hem eval    Thank you

## 2018-11-09 NOTE — CONSULT NOTE ADULT - SUBJECTIVE AND OBJECTIVE BOX
Patient is a 68y old  Male who presents with a chief complaint of Anemia (09 Nov 2018 06:35)      HPI:  67 y/o male with history of DM-2 not on meds, legally blind due to B/L Cataracts for which he has declined surgery, Urinary retention with chronic bourne and chronic suppressive abx therapy with last bourne change on 1/26, chronic anemia with baseline Hbg of 6-7 based on o/p records, CKD-3 Patient saw his PMD yesterday for his "routine" f/u appt. Told he has severe anemia.      Patient states he has squeezing chest pressure/tightness. No heartburn, no nausea, no vomiting,+ slight constipation. Has harder BM qd. Pt is unable ot tell if he has rectal bleeding or melena. No abdominal pain.  he lost 10 lbs in last few months- no decrease in appetite. AS per primary note, pt had recent egd, but pt is denying any recent procedures. Colonoscopy was normal many years ago as per patient.    He is also denying hx of anemia to me. No family hx of  colon cancer or polyps.       REVIEW OF SYSTEMS:  Constitutional: No fever, weight loss or fatigue  ENMT:  No difficulty hearing, tinnitus, vertigo; No sinus or throat pain  Respiratory: No cough, wheezing, chills or hemoptysis  Cardiovascular:+chest pain, palpitations, dizziness or leg swelling  Gastrointestinal: No abdominal or epigastric pain. No nausea, vomiting or hematemesis; No diarrhea or constipation. No melena or hematochezia.  Skin: No itching, burning, rashes or lesions   Musculoskeletal: No joint pain or swelling; No muscle, back or extremity pain    PAST MEDICAL & SURGICAL HISTORY:  Urinary retention  Other secondary cataract of both eyes  CKD (chronic kidney disease) stage 3, GFR 30-59 ml/min  Chronic anemia  Diabetes  No significant past surgical history      FAMILY HISTORY:  Family history of diabetes mellitus      SOCIAL HISTORY:  Smoking Status: [ ] Current, [ ] Former, [ ] Never  Pack Years:  [  ] EtOH-no  [  ] IVDA-no    MEDICATIONS:  MEDICATIONS  (STANDING):  ascorbic acid 500 milliGRAM(s) Oral daily  cyanocobalamin 1000 MICROGram(s) Oral daily  dextrose 5%. 1000 milliLiter(s) (50 mL/Hr) IV Continuous <Continuous>  dextrose 50% Injectable 12.5 Gram(s) IV Push once  epoetin daniel Injectable 54115 Unit(s) SubCutaneous <User Schedule>  folic acid 1 milliGRAM(s) Oral daily  insulin lispro (HumaLOG) corrective regimen sliding scale   SubCutaneous Before meals and at bedtime  iron sucrose IVPB 200 milliGRAM(s) IV Intermittent once  multivitamin 1 Tablet(s) Oral daily  pantoprazole Infusion 8 mG/Hr (10 mL/Hr) IV Continuous <Continuous>  sodium chloride 0.9% lock flush 3 milliLiter(s) IV Push every 8 hours  sodium chloride 0.9%. 1000 milliLiter(s) (75 mL/Hr) IV Continuous <Continuous>  tamsulosin 0.8 milliGRAM(s) Oral at bedtime  trimethoprim  160 mG/sulfamethoxazole 800 mG 1 Tablet(s) Oral two times a day    MEDICATIONS  (PRN):  dextrose 40% Gel 15 Gram(s) Oral once PRN Blood Glucose LESS THAN 70 milliGRAM(s)/deciliter  glucagon  Injectable 1 milliGRAM(s) IntraMuscular once PRN Glucose LESS THAN 70 milligrams/deciliter  ondansetron Injectable 4 milliGRAM(s) IV Push every 6 hours PRN Nausea      Allergies    amoxicillin (Rash)    Intolerances        Vital Signs Last 24 Hrs  T(C): 36.8 (09 Nov 2018 07:54), Max: 36.8 (08 Nov 2018 19:26)  T(F): 98.3 (09 Nov 2018 07:54), Max: 98.3 (08 Nov 2018 19:26)  HR: 74 (09 Nov 2018 07:54) (73 - 92)  BP: 130/70 (09 Nov 2018 07:54) (119/63 - 137/73)  BP(mean): 92 (09 Nov 2018 02:31) (92 - 92)  RR: 18 (09 Nov 2018 07:54) (18 - 18)  SpO2: 98% (09 Nov 2018 07:54) (97% - 100%)    11-08 @ 07:01  -  11-09 @ 07:00  --------------------------------------------------------  IN: 0 mL / OUT: 1000 mL / NET: -1000 mL          PHYSICAL EXAM:    General: overnight  HEENT: MMM, conjunctiva and sclera clear  H-RRR  L- CTA  Gastrointestinal: Soft, non-tender non-distended; Normal bowel sounds; No rebound or guarding  Extremities: Normal range of motion, No clubbing, cyanosis or edema  Neurological: Alert and oriented x3  Skin: Warm and dry. No obvious rash  rectal- no masses, brown stool      LABS:                        4.7    6.0   )-----------( 262      ( 08 Nov 2018 22:18 )             15.5     08 Nov 2018 22:18    137    |  104    |  24.0   ----------------------------<  156    4.8     |  20.0   |  2.49     Ca    8.5        08 Nov 2018 22:18  Mg     2.0       08 Nov 2018 22:18    TPro  6.9    /  Alb  3.9    /  TBili  0.4    /  DBili  x      /  AST  24     /  ALT  10     /  AlkPhos  62     / Amylase x      /Lipase 43     08 Nov 2018 22:18              RADIOLOGY & ADDITIONAL STUDIES:     < from: Xray Chest 2 Views PA/Lat (11.08.18 @ 23:50) >  IMPRESSION:    No acute cardiopulmonary disease process.    < end of copied text >

## 2018-11-09 NOTE — CONSULT NOTE ADULT - SUBJECTIVE AND OBJECTIVE BOX
[facility]  MD Radha Kingsley MD  [phone]  Answering Sevice : [phone]        ANTOINETTE [id number]      HPI:  67 y/o male with history of DM-2 not on meds, legally blind due to B/L Cataracts for which he has declined surgery, Urinary retention with chronic bourne and chronic suppressive abx therapy with last bourne change on , chronic anemia with baseline Hbg of 6-7 based on o/p records, CKD-3 Patient saw his PMD yesterday for his "routine" f/u appt. Denies any other new symptoms. He was sent for blood work and was noted to have a hbg of 5.0  subsequent ones in ED where negative for acute changes. On admission Hbg is noted ot be 4.9. Stool was brown but occult positive. His vitals are stable. c/o pressure in his chest Patientis a jehova's witness and  adamantly declining PRBC transfusion. Willing to proceed with bloodless medicine protocol. (2018 02:31)   gives history of being unable to tolerate oral iron supplements in the past.      PAST MEDICAL & SURGICAL HISTORY:  Urinary retention  Other secondary cataract of both eyes  CKD (chronic kidney disease) stage 3, GFR 30-59 ml/min  Chronic anemia  Diabetes  No significant past surgical history      ANTIBIOTICS  trimethoprim  160 mG/sulfamethoxazole 800 mG 1 Tablet(s) Oral two times a day      Allergies    amoxicillin (Rash)    Intolerances        SOCIAL HISTORY:    FAMILY HISTORY:  Family history of diabetes mellitus      Vital Signs Last 24 Hrs  T(C): 36.5 (2018 11:24), Max: 36.8 (2018 19:26)  T(F): 97.7 (2018 11:24), Max: 98.3 (2018 19:26)  HR: 145 (2018 11:24) (73 - 145)  BP: 117/66 (2018 11:24) (117/66 - 137/73)  BP(mean): 92 (2018 02:31) (92 - 92)  RR: 17 (2018 11:24) (17 - 18)  SpO2: 90% (2018 11:24) (90% - 100%)  Drug Dosing Weight  Height (cm): 180.34 (2018 19:26)  Weight (kg): 111.1 (2018 19:26)  BMI (kg/m2): 34.2 (2018 19:26)  BSA (m2): 2.3 (2018 19:26)      REVIEW OF SYSTEMS:    CONSTITUTIONAL:  As per HPI.    HEENT:  Eyes:  No diplopia or blurred vision. ENT:  No earache, sore throat or runny nose.    CARDIOVASCULAR:  No pressure, squeezing, strangling, tightness, heaviness or aching about the chest, neck, axilla or epigastrium.    RESPIRATORY:  No cough, shortness of breath, PND or orthopnea.    GASTROINTESTINAL:  No nausea, vomiting or diarrhea.    GENITOURINARY:  No dysuria, frequency or urgency.    MUSCULOSKELETAL:  As per HPI.    SKIN:  No change in skin, hair or nails.    NEUROLOGIC:  No paresthesias, fasciculations, seizures or weakness.    PSYCHIATRIC:  No disorder of thought or mood.    ENDOCRINE:  No heat or cold intolerance, polyuria or polydipsia.    HEMATOLOGICAL:  No easy bruising or bleeding.           PHYSICAL EXAMINATION:    GENERAL: The patient is a well-developed, well-nourished _____in no apparent distress. ___ is alert and oriented x3.    VITAL SIGNS:     HEENT: Head is normocephalic and atraumatic. Extraocular muscles are intact. Pupils are equal, round, and reactive to light and accommodation. Nares appeared normal. Mouth is well hydrated and without lesions. Mucous membranes are moist. Posterior pharynx clear of any exudate or lesions.    NECK: Supple. No carotid bruits.  No lymphadenopathy or thyromegaly.    LUNGS: Clear to auscultation.    HEART: Regular rate and rhythm without murmur.    ABDOMEN: Soft, nontender, and nondistended.  Positive bowel sounds.  No hepatosplenomegaly was noted.    EXTREMITIES: Without any cyanosis, clubbing, rash, lesions or edema.    NEUROLOGIC: Cranial nerves II through XII are grossly intact.    PSYCHIATRIC: Flat affect, but denies suicidal or homicidal ideations.  SKIN: No ulceration or induration present.    MICROBIOLOGY:      LABS:                        4.7    6.0   )-----------( 262      ( 2018 22:18 )             15.5         137  |  104  |  24.0<H>  ----------------------------<  156<H>  4.8   |  20.0<L>  |  2.49<H>    Ca    8.5<L>      2018 22:18  Mg     2.0     11-    TPro  6.9  /  Alb  3.9  /  TBili  0.4  /  DBili  x   /  AST  24  /  ALT  10  /  AlkPhos  62  11-    PT/INR - ( 2018 22:18 )   PT: 14.2 sec;   INR: 1.23 ratio         PTT - ( 2018 22:18 )  PTT:25.7 sec  Urinalysis Basic - ( 2018 22:39 )    Color: Yellow / Appearance: Clear / S.010 / pH: x  Gluc: x / Ketone: Negative  / Bili: Negative / Urobili: Negative mg/dL   Blood: x / Protein: Negative mg/dL / Nitrite: Negative   Leuk Esterase: Negative / RBC: x / WBC x   Sq Epi: x / Non Sq Epi: x / Bacteria: x        RADIOLOGY & ADDITIONAL STUDIES:      ASSESSMENT:  68-year-old male history of chronic kidney disease, Jain presents with severe degree of microcytic hypochromic anemia.  Anemia workup consistent with iron deficiency, stool positive for occult blood   Has history of chronic anemia and gives history of being unable to tolerate oral iron supplements in the past    PLAN:  intravenous iron and Procrit as ordered  Would need GI workup to rule out presence of any underlying GI pathology for chronic blood loss  monitor blood counts  Advised follow-up as outpatient as he may need further intravenous iron as outpatient to replete his iron stores  thank you for this consult    ANASTACIA ROUSSEAU MD

## 2018-11-09 NOTE — CONSULT NOTE ADULT - SUBJECTIVE AND OBJECTIVE BOX
Patient is a 68y old  Male who presents with a chief complaint of Anemia (2018 02:31)   Acute  renal failure.    HPI:  69 y/o male with history of DM-2 not on meds, legally blind due to B/L Cataracts for which he has declined surgery, Urinary retention with chronic bourne and chronic suppressive abx therapy with last bourne change on , chronic anemia with baseline Hbg of 6-7 based on o/p records, CKD-3 Patient saw his PMD yesterday for his "routine" f/u appt. Denies any other new symptoms. He was sent for blood work and was noted to have a hbg of 5.0 Patient unable to tell if he is having any dark BM or BRBPR as he is legally blind. He got a call from his PMD notifying him of this result and was told to call EMS to go to the nearest ER. As per EMS sheets patient got anxious and started have chest pressure when they arrived. He received aspirin 325 x 1 and SL nitro spray with little relief. His symptoms resolved by the time he got to the ED. EKG in the field did not show STEMI, and 2 subsequent ones in ED where negative for acute changes. His Trop. is 07 and his Hbg is noted ot be 4.9. Stool was brown but occult positive. His vitals are stable. Currently he has no complaints. Patient adamantly declining PRBC transfusion. Willing to proceed with bloodless medicine protocol. (2018 02:31)    This patient was sent into the hospital by myself because of an alert value was called. The patient was told a hemoglobin of 5.5. He was previously on Procrit that was being given by Dr. Carr. His hemoglobin increased to > 10.5 and the procrit was then held to avoid over correction. However, the repeat labs showed a rapid decreased in the hemoglobin. The patient admits to "heartburn" and has been told of gastritis in the past. He is blind and can not endorse if he had bloody stool or melena. He otherwise has no other symptoms    PAST MEDICAL & SURGICAL HISTORY:  Urinary retention  Other secondary cataract of both eyes  CKD (chronic kidney disease) stage 3, GFR 30-59 ml/min  Chronic anemia  Diabetes  No significant past surgical history   DM 2, [place], hypothyroidism, prior DVT and IVC filter; Cholecystectomy    FAMILY HISTORY:  Family history of diabetes mellitus  [place]    Social History:Non smoker    MEDICATIONS  (STANDING):  ascorbic acid 500 milliGRAM(s) Oral daily  cyanocobalamin 1000 MICROGram(s) Oral daily  dextrose 5%. 1000 milliLiter(s) (50 mL/Hr) IV Continuous <Continuous>  dextrose 50% Injectable 12.5 Gram(s) IV Push once  epoetin daniel Injectable 74697 Unit(s) SubCutaneous <User Schedule>  folic acid 1 milliGRAM(s) Oral daily  insulin lispro (HumaLOG) corrective regimen sliding scale   SubCutaneous Before meals and at bedtime  iron sucrose IVPB 100 milliGRAM(s) IV Intermittent <User Schedule>  multivitamin 1 Tablet(s) Oral daily  pantoprazole Infusion 8 mG/Hr (10 mL/Hr) IV Continuous <Continuous>  sodium chloride 0.9% lock flush 3 milliLiter(s) IV Push every 8 hours  sodium chloride 0.9%. 1000 milliLiter(s) (75 mL/Hr) IV Continuous <Continuous>  trimethoprim  160 mG/sulfamethoxazole 800 mG 1 Tablet(s) Oral two times a day    MEDICATIONS  (PRN):  dextrose 40% Gel 15 Gram(s) Oral once PRN Blood Glucose LESS THAN 70 milliGRAM(s)/deciliter  glucagon  Injectable 1 milliGRAM(s) IntraMuscular once PRN Glucose LESS THAN 70 milligrams/deciliter  ondansetron Injectable 4 milliGRAM(s) IV Push every 6 hours PRN Nausea   Meds reviewed    Allergies    amoxicillin (Rash)    Intolerances         REVIEW OF SYSTEMS:    CONSTITUTIONAL: neg  EYES: No eye pain, visual disturbances, or discharge; +Blind  ENMT:  No difficulty hearing, tinnitus, vertigo; No sinus or throat pain  NECK: No pain or stiffness  BREASTS: No pain, masses, or nipple discharge  RESPIRATORY: neg  CARDIOVASCULAR: No chest pain, palpitations, dizziness,   GASTROINTESTINAL: No abdominal or epigastric pain. +"heartburn", No nausea, vomiting, or hematemesis; No diarrhea or constipation. Unable to describe stool due to blindness  GENITOURINARY: No dysuria, frequency, hematuria, or incontinence  NEUROLOGICAL: No headaches, memory loss, loss of strength, numbness, or tremors  SKIN: neg  LYMPH NODES: No enlarged glands  ENDOCRINE: No heat or cold intolerance; No hair loss  MUSCULOSKELETAL: neg  PSYCHIATRIC: No depression, anxiety, mood swings, or difficulty sleeping  HEME/LYMPH: No easy bruising, or bleeding gums  ALLERGY AND IMMUNOLOGIC: No hives or eczema    Vital Signs Last 24 Hrs  T(C): 36.8 (2018 04:26), Max: 36.8 (2018 19:26)  T(F): 98.2 (2018 04:26), Max: 98.3 (2018 19:26)  HR: 75 (2018 04:26) (73 - 92)  BP: 137/73 (2018 04:26) (119/63 - 137/73)  BP(mean): 92 (2018 02:31) (92 - 92)  RR: 18 (:) (18 - 18)  SpO2: 100% (:) (97% - 100%)  Daily Height in cm: 180.34 (2018 19:26)    Daily     PHYSICAL EXAM:    GENERAL: No distress, comfortable  HEAD:  Atraumatic, Normocephalic  EYES: Conjunctiva and sclera clear  ENMT: Moist mucous membranes, Good dentition, No lesions  NECK: Supple, No JVD  NERVOUS SYSTEM:  Alert & Oriented X3, Good concentration; Motor Strength wnl upper and lower extremities  CHEST/LUNG: Clear to percussion bilaterally; No rales, rhonchi, wheezing, or rubs  HEART: Regular rate and rhythm; No murmurs, rubs, or gallops  ABDOMEN: Soft, Nontender, Nondistended; Bowel sounds present  EXTREMITIES:  No edema B/L  LYMPH: No lymphadenopathy noted  SKIN: No rashes or lesions, pale    LABS:                        4.7    6.0   )-----------( 262      ( 2018 22:18 )             15.5     11    137  |  104  |  24.0<H>  ----------------------------<  156<H>  4.8   |  20.0<L>  |  2.49<H>    Ca    8.5<L>      2018 22:18  Mg     2.0         TPro  6.9  /  Alb  3.9  /  TBili  0.4  /  DBili  x   /  AST  24  /  ALT  10  /  AlkPhos  62  -    PT/INR - ( 2018 22:18 )   PT: 14.2 sec;   INR: 1.23 ratio         PTT - ( 2018 22:18 )  PTT:25.7 sec  Urinalysis Basic - ( 2018 22:39 )    Color: Yellow / Appearance: Clear / S.010 / pH: x  Gluc: x / Ketone: Negative  / Bili: Negative / Urobili: Negative mg/dL   Blood: x / Protein: Negative mg/dL / Nitrite: Negative   Leuk Esterase: Negative / RBC: x / WBC x   Sq Epi: x / Non Sq Epi: x / Bacteria: x      Magnesium, Serum: 2.0 mg/dL ( @ 22:18)        RADIOLOGY & ADDITIONAL TESTS:

## 2018-11-09 NOTE — CONSULT NOTE ADULT - PROBLEM SELECTOR RECOMMENDATION 3
Encourage patient to participate in activities  Encourage conversation, explain step by step all procedures (be considerate - patient is blind both eyes)  Keep a safe well lit environment

## 2018-11-09 NOTE — CONSULT NOTE ADULT - PROBLEM SELECTOR RECOMMENDATION 4
-Met with patient at bedside along with Bushra RAMOS caring for patient today  - Patient has no c.o discomforts including generalized pain, CHEST pain, palpitations, shortness of breath/dyspnea, N/V/D/Constipation.  - Patient states he is NOT a [employer] - that is NOT his [employer]ism/belief system. Patient states he just doesn't believe in blood transfusions, and never has. Patient states his hgb has dropped like this in the past and he "always turns out fine". Explained to patient the risks involved and the damage that can be caused on the body with low blood counts, but patient is addiment about not having any blood transfusions.   - Approached conversation of advance directives - discussed and explained FULL code status including chest compressions and intubation. As of now patient wishes to follow through with maintaining a FULL code status.  - Patient states he is legally blind due to the cataracts that formed on his eyes years ago. Patient states the physician informed him at that time that his cataracts were "inoperable because he couldn't see what was behind the cataracts and the surgery could possibly cause complete blindness immediately"  - Patient states his daughter Amelia is his  for emergency contact.  - Placed call to daughter Amelia who verified all the information the patient provided. Amelia states she cares for him at home but that he has always been mostly independent. Amelia states that he is not always open about the symptoms he may be having because he doesn't like medicine/hospitals in general.   - Will continue to support and monitor     Amelia (Daughter) Kimberly [phone]  Total time spent 50 minutes     COUNSELING:  Face to face meeting to discuss Advanced Care Planning - Time Spent _30_____Minutes.  See goals of care note.      Thank you for the opportunity to assist with the care of this patient.   [facility] [phone].

## 2018-11-09 NOTE — ED ADULT NURSE REASSESSMENT NOTE - NS ED NURSE REASSESS COMMENT FT1
Pt seen and evaluated by Admitting MD, Orders received and medications given. Patient remains alert and oriented x3, no s/s of distress. Denies SOB, denies dizziness denies chest pressure at this time. Resting comfortably in bed. Report given to accepting RN Aiden in CDU. Pt transferred to CDU 13L, VSS, NSR on cardiac monitor. Safety maintained.

## 2018-11-09 NOTE — H&P ADULT - PROBLEM SELECTOR PLAN 4
Patient declines to use medications or check his FS at home. Cont. HISS/Accuchecks while in hospital.

## 2018-11-09 NOTE — H&P ADULT - PROBLEM SELECTOR PLAN 2
Trop likely multifactorial from CKD, supply/demand mismatch from severe anemia. Will start on low dose BB to reduce any tachycardia making it worse since patient declining transfusion. He did receive aspirin in the field which I will not cont. in setting of anemia and occult pos stool.

## 2018-11-09 NOTE — PROGRESS NOTE ADULT - ASSESSMENT
Assessment and Plan:   57 y/o male with acute on chronic anemia, occult pos stool, resolved CP, hx of DM-2, Legally blind from cataracts, Urinary retention with chronic bourne POA     Problem/Plan - 1:  ·  Problem: Iron deficiency anemia due to chronic blood loss.  Plan: Patient with baseline Hbg of 6-7 with old indicis showing microcytic/hypochromic likely from chronic blood loss vs underlying thalassemia. Patient currently is declining PRBC transfusion under any circumstances. He is well aware of the risks of MI/CVA/and or sudden death. He is currently hemodynamically stable, has no acute EKG changes and has capacity to make his own decisions. His Daughter was in ED earlier and is aware of his decision. He is willing to proceed with High dose procrit, IV iron, FA, MVI, Vit. C. He is ok with IV PPI for possible Upper GI source of chronic blood loss. He currently is not interested in EGD/Colonoscopy. He was advised to consider this to at least address source of bleeding to reduce cont. blood loss. Avoid any unnecessary blood draws.     Problem/Plan - 2:  ·  Problem: Other chest pain.  Plan: Trop likely multifactorial from CKD, supply/demand mismatch from severe anemia. Will start on low dose BB to reduce any tachycardia making it worse since patient declining transfusion. He did receive aspirin in the field which I will not cont. in setting of anemia and occult pos stool.      Problem/Plan - 3:  ·  Problem: CKD (chronic kidney disease) stage 3, GFR 30-59 ml/min.  Plan: At baseline based on previous o/p records. Avoid nephrotoxic drugs.      Problem/Plan - 4:  ·  Problem: Type 2 diabetes mellitus with diabetic nephropathy, without long-term current use of insulin.  Plan: Patient declines to use medications or check his FS at home. Cont. HISS/Accuchecks while in hospital.      Problem/Plan - 5:  ·  Problem: Urinary retention.  Plan: Cont. bourne as per Urology, cont. prophylactic Bactrim ordered by Dr. Fonseca.      Problem/Plan - 6:  Problem: Other secondary cataract of both eyes. Plan: Likely due to poorly controlled DM. Patient not interested in cataract surgery. Cont. fall risk/ambulate with assistance. Assessment and Plan:   59 y/o male with acute on chronic anemia, occult pos stool, resolved CP, hx of DM-2, Legally blind from cataracts, Urinary retention with chronic bourne POA.  GI, CARDIO AND HEMATOLOGY consulted     Problem/Plan - 1:  ·  Problem: Iron deficiency anemia due to chronic blood loss.    Plan: Patient with baseline Hbg of 6-7 with old indicis showing microcytic/hypochromic likely from chronic blood loss vs underlying thalassemia.   Patient currently is declining PRBC transfusion under any circumstances. He is well aware of the risks of MI/CVA/and or sudden death. He is currently hemodynamically stable, has no acute EKG changes and has capacity to make his own decisions. His Daughter  is aware of his decision.   He is willing to proceed with High dose procrit, IV iron, FA, MVI, Vit. C.   He is ok with IV PPI for possible Upper GI source of chronic blood loss. He currently is not interested in EGD/Colonoscopy. He was advised to consider this to at least address source of bleeding to reduce cont. blood loss. Avoid any unnecessary blood draws.  GI, hematology and Cardiology and nephrology consulted  s/p IV IRON 200 MCG TODAY, Will give IV iron tomorrow  continue procirt  diet started     Problem/Plan - 2:  ·  Problem: Other chest pain.  Plan: Trop likely multifactorial from CKD, supply/demand mismatch from severe anemia. Will start on low dose BB to reduce any tachycardia making it worse since patient declining transfusion. He did receive aspirin in the field which I will not cont. in setting of anemia and occult pos stool.   TTE ordered  cardiology consulted     Problem/Plan - 3:  ·  Problem: CKD (chronic kidney disease) stage 3, GFR 30-59 ml/min.  Plan: At baseline based on previous o/p records. Avoid nephrotoxic drugs.   Nephrology on board     Problem/Plan - 4:  ·  Problem: Type 2 diabetes mellitus with diabetic nephropathy, without long-term current use of insulin.  Plan: Patient declines to use medications or check his FS at home. Cont. HISS/Accuchecks while in hospital.   DASH diet     Problem/Plan - 5:  ·  Problem: Urinary retention.  Plan: Cont. bourne as per Urology, cont. prophylactic Bactrim ordered by Dr. Fonseca.   continue flomax and bourne re inserted today 11/9 for urinary retention, bladder sonogram 600 ml.     Problem/Plan - 6:  Problem: Other secondary cataract of both eyes. Plan: Likely due to poorly controlled DM. Patient not interested in cataract surgery. Cont. fall risk/ambulate with assistance.    GOC:  GOC discussed with patient. He wants to think about it. Palliative consulted.  17 min spent while discussing GOC and this was separate from management of patient medical problem    patient is Critial sick and at risk of decompensation.

## 2018-11-09 NOTE — PROGRESS NOTE ADULT - SUBJECTIVE AND OBJECTIVE BOX
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 Denies any other new symptoms. He was sent for blood work and was noted to have a hbg of 5.0 Patient unable to tell if he is having any dark BM or BRBPR as he is legally blind. He got a call from his PMD notifying him of this result and was told to call EMS to go to the nearest ER. As per EMS sheets patient got anxious and started have chest pressure when they arrived. He received aspirin 325 x 1 and SL nitro spray with little relief. His symptoms resolved by the time he got to the ED. EKG in the field did not show STEMI, and 2 subsequent ones in ED where negative for acute changes. His Trop. is 07 and his Hbg is noted ot be 4.9. Stool was brown but occult positive. His vitals are stable. Currently he has no complaints. Patient adamantly declining PRBC transfusion. Willing to proceed with bloodless medicine protocol.     REVIEW OF SYSTEMS:    CONSTITUTIONAL: No fever, weight loss, or fatigue  RESPIRATORY: No cough, wheezing, hemoptysis; No shortness of breath  CARDIOVASCULAR: No chest pain, palpitations  GASTROINTESTINAL: No abdominal or epigastric pain. No nausea, vomiting  NEUROLOGICAL: No headaches, memory loss, loss of strength.  MISCELLANEOUS:      Vital Signs Last 24 Hrs  T(C): 36.5 (2018 11:24), Max: 36.8 (2018 19:26)  T(F): 97.7 (2018 11:24), Max: 98.3 (2018 19:26)  HR: 145 (2018 11:24) (73 - 145)  BP: 117/66 (2018 11:24) (117/66 - 137/73)  BP(mean): 92 (2018 02:31) (92 - 92)  RR: 17 (2018 11:24) (17 - 18)  SpO2: 90% (2018 11:24) (90% - 100%)    PHYSICAL EXAM:    GENERAL: NAD, well-groomed  HEENT: PERRL, +EOMI  NECK: soft, Supple, No JVD,   CHEST/LUNG: Clear to auscultation bilaterally; No wheezing  HEART: S1S2+, Regular rate and rhythm; No murmurs, rubs, or gallops  ABDOMEN: Soft, Nontender, Nondistended; Bowel sounds present  EXTREMITIES:  2+ Peripheral Pulses, No clubbing, cyanosis, or edema  SKIN: No rashes or lesions  NEURO: AAOX3, no focal deficits, no motor r sensory loss  PSYCH: normal mood      LABS:                        4.7    6.0   )-----------( 262      ( 2018 22:18 )             15.5     11-    137  |  104  |  24.0<H>  ----------------------------<  156<H>  4.8   |  20.0<L>  |  2.49<H>    Ca    8.5<L>      2018 22:18  Mg     2.0     11-    TPro  6.9  /  Alb  3.9  /  TBili  0.4  /  DBili  x   /  AST  24  /  ALT  10  /  AlkPhos  62  11-08    PT/INR - ( 2018 22:18 )   PT: 14.2 sec;   INR: 1.23 ratio         PTT - ( 2018 22:18 )  PTT:25.7 sec  Urinalysis Basic - ( 2018 22:39 )    Color: Yellow / Appearance: Clear / S.010 / pH: x  Gluc: x / Ketone: Negative  / Bili: Negative / Urobili: Negative mg/dL   Blood: x / Protein: Negative mg/dL / Nitrite: Negative   Leuk Esterase: Negative / RBC: x / WBC x   Sq Epi: x / Non Sq Epi: x / Bacteria: x          MEDICATIONS  (STANDING):  ascorbic acid 500 milliGRAM(s) Oral daily  cyanocobalamin 1000 MICROGram(s) Oral daily  dextrose 5%. 1000 milliLiter(s) (50 mL/Hr) IV Continuous <Continuous>  dextrose 50% Injectable 12.5 Gram(s) IV Push once  epoetin daniel Injectable 93266 Unit(s) SubCutaneous <User Schedule>  folic acid 1 milliGRAM(s) Oral daily  insulin lispro (HumaLOG) corrective regimen sliding scale   SubCutaneous Before meals and at bedtime  multivitamin 1 Tablet(s) Oral daily  pantoprazole Infusion 8 mG/Hr (10 mL/Hr) IV Continuous <Continuous>  saccharomyces boulardii 250 milliGRAM(s) Oral two times a day  sodium chloride 0.9% lock flush 3 milliLiter(s) IV Push every 8 hours  sodium chloride 0.9%. 1000 milliLiter(s) (75 mL/Hr) IV Continuous <Continuous>  tamsulosin 0.8 milliGRAM(s) Oral at bedtime  trimethoprim  160 mG/sulfamethoxazole 800 mG 1 Tablet(s) Oral two times a day    MEDICATIONS  (PRN):  dextrose 40% Gel 15 Gram(s) Oral once PRN Blood Glucose LESS THAN 70 milliGRAM(s)/deciliter  glucagon  Injectable 1 milliGRAM(s) IntraMuscular once PRN Glucose LESS THAN 70 milligrams/deciliter  ondansetron Injectable 4 milliGRAM(s) IV Push every 6 hours PRN Nausea      RADIOLOGY & ADDITIONAL TESTS: ANTOINETTE WILSON    [id number]    68y      Male    CC: sent by his pmd for low hb 4.9    INTERVAL HPI/OVERNIGHT EVENTS:  was seen and examined at bedside. denied active complains. his bourne came out by itself as per RN and he refused to put it back on and wants to see if he can pee on his own. After few hours rn mentioned that bladder sonogram showed 500-600 ml. bourne cather order placed and started on flomax.    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 Denies any other new symptoms. He was sent for blood work and was noted to have a hbg of 5.0 Patient unable to tell if he is having any dark BM or BRBPR as he is legally blind. He got a call from his PMD notifying him of this result and was told to call EMS to go to the nearest ER. As per EMS sheets patient got anxious and started have chest pressure when they arrived. He received aspirin 325 x 1 and SL nitro spray with little relief. His symptoms resolved by the time he got to the ED. EKG in the field did not show STEMI, and 2 subsequent ones in ED where negative for acute changes. His Trop. is 07 and his Hbg is noted ot be 4.9. Stool was brown but occult positive. His vitals are stable. Currently he has no complaints. Patient adamantly declining PRBC transfusion. Willing to proceed with bloodless medicine protocol.       Vital Signs Last 24 Hrs  T(C): 36.5 (2018 11:24), Max: 36.8 (2018 19:26)  T(F): 97.7 (2018 11:24), Max: 98.3 (2018 19:26)  HR: 145 (2018 11:24) (73 - 145)  BP: 117/66 (2018 11:24) (117/66 - 137/73)  BP(mean): 92 (2018 02:31) (92 - 92)  RR: 17 (2018 11:24) (17 - 18)  SpO2: 90% (2018 11:24) (90% - 100%)    PHYSICAL EXAM:    GENERAL: NAD, well-groomed, legally blind  HEENT: PERRL, +EOMI  NECK: soft, Supple, No JVD,   CHEST/LUNG: Clear to auscultation bilaterally; No wheezing  HEART: S1S2+, Regular rate and rhythm; No murmurs, rubs, or gallops  ABDOMEN: Soft, Nontender, distended; Bowel sounds present, no fluid thrill  EXTREMITIES:  2+ Peripheral Pulses, No clubbing, cyanosis, or edema  SKIN: No rashes or lesions  NEURO: AAOX3, no focal deficits, no motor r sensory loss  PSYCH: normal mood      LABS:                        4.7    6.0   )-----------( 262      ( 2018 22:18 )             15.5     11-    137  |  104  |  24.0<H>  ----------------------------<  156<H>  4.8   |  20.0<L>  |  2.49<H>    Ca    8.5<L>      2018 22:18  Mg     2.0     11-    TPro  6.9  /  Alb  3.9  /  TBili  0.4  /  DBili  x   /  AST  24  /  ALT  10  /  AlkPhos  62  11-08    PT/INR - ( 2018 22:18 )   PT: 14.2 sec;   INR: 1.23 ratio         PTT - ( 2018 22:18 )  PTT:25.7 sec  Urinalysis Basic - ( 2018 22:39 )    Color: Yellow / Appearance: Clear / S.010 / pH: x  Gluc: x / Ketone: Negative  / Bili: Negative / Urobili: Negative mg/dL   Blood: x / Protein: Negative mg/dL / Nitrite: Negative   Leuk Esterase: Negative / RBC: x / WBC x   Sq Epi: x / Non Sq Epi: x / Bacteria: x          MEDICATIONS  (STANDING):  ascorbic acid 500 milliGRAM(s) Oral daily  cyanocobalamin 1000 MICROGram(s) Oral daily  dextrose 5%. 1000 milliLiter(s) (50 mL/Hr) IV Continuous <Continuous>  dextrose 50% Injectable 12.5 Gram(s) IV Push once  epoetin daniel Injectable 85584 Unit(s) SubCutaneous <User Schedule>  folic acid 1 milliGRAM(s) Oral daily  insulin lispro (HumaLOG) corrective regimen sliding scale   SubCutaneous Before meals and at bedtime  multivitamin 1 Tablet(s) Oral daily  pantoprazole Infusion 8 mG/Hr (10 mL/Hr) IV Continuous <Continuous>  saccharomyces boulardii 250 milliGRAM(s) Oral two times a day  sodium chloride 0.9% lock flush 3 milliLiter(s) IV Push every 8 hours  sodium chloride 0.9%. 1000 milliLiter(s) (75 mL/Hr) IV Continuous <Continuous>  tamsulosin 0.8 milliGRAM(s) Oral at bedtime  trimethoprim  160 mG/sulfamethoxazole 800 mG 1 Tablet(s) Oral two times a day    MEDICATIONS  (PRN):  dextrose 40% Gel 15 Gram(s) Oral once PRN Blood Glucose LESS THAN 70 milliGRAM(s)/deciliter  glucagon  Injectable 1 milliGRAM(s) IntraMuscular once PRN Glucose LESS THAN 70 milligrams/deciliter  ondansetron Injectable 4 milliGRAM(s) IV Push every 6 hours PRN Nausea      RADIOLOGY & ADDITIONAL TESTS:

## 2018-11-09 NOTE — H&P ADULT - HISTORY OF PRESENT ILLNESS
67 y/o male with history of DM-2 not on meds, legally blind due to B/L Cataracts for which he has declined surgery, Urinary retention with chronic bourne and chronic suppressive abx therapy with last bourne change on 1/26, chronic anemia with baseline Hbg of 6-7 based on o/p records. Patient saw his PMD yesterday c/o intermittent chest pressure. Denies any other new symptoms. He was sent for blood work and was noted to have a hbg of 5.0 Patient unable to tell if he is having any dark BM or BRBPR as he is legally blind. He c 69 y/o male with history of DM-2 not on meds, legally blind due to B/L Cataracts for which he has declined surgery, Urinary retention with chronic bourne and chronic suppressive abx therapy with last bourne change on 1/26, chronic anemia with baseline Hbg of 6-7 based on o/p records, CKD-3 Patient saw his PMD yesterday for his "routine" f/u appt. Denies any other new symptoms. He was sent for blood work and was noted to have a hbg of 5.0 Patient unable to tell if he is having any dark BM or BRBPR as he is legally blind. He got a call from his PMD notifying him of this result and was told to call EMS to go to the nearest ER. As per EMS sheets patient got anxious and started have chest pressure when they arrived. He received aspirin 325 x 1 and SL nitro spray with little relief. His symptoms resolved by the time he got to the ED. EKG in the field did not show STEMI, and 2 subsequent ones in ED where negative for acute changes. His Trop. is 07 and his Hbg is noted ot be 4.9. Stool was brown but occult positive. His vitals are stable. Currently he has no complaints. Patient adamantly declining PRBC transfusion. Willing to proceed with bloodless medicine protocol.

## 2018-11-09 NOTE — PROVIDER CONTACT NOTE (CRITICAL VALUE NOTIFICATION) - ACTION/TREATMENT ORDERED:
JENNIFER VELA MADE AWARE. PATIENT REFUSING BLOOD TRANSFUSIONS AT THIS TIME. NO NEW INTERVENTIONS AT THIS TIME.

## 2018-11-09 NOTE — H&P ADULT - ASSESSMENT
59 y/o male with acute on chronic anemia, occult pos stool, resolved CP, hx of DM-2, Legally blind from cataracts, Urinary retention with chronic bourne POA

## 2018-11-09 NOTE — H&P ADULT - PROBLEM SELECTOR PLAN 6
Likely due to poorly controlled DM. Patient not interested in cataract surgery. Cont. fall risk/ambulate with assistance.

## 2018-11-09 NOTE — H&P ADULT - PROBLEM SELECTOR PLAN 1
Patient with baseline Hbg of 6-7 with old indicis showing microcytic/hypochromic likely from chronic blood loss vs underlying thalassemia. Patient currently is declining PRBC transfusion under any circumstances. He is well aware of the risks of MI/CVA/and or sudden death. He is currently hemodynamically stable, has no acute EKG changes and has capacity to make his own decisions. His Daughter was in ED earlier and is aware of his decision. He is willing to proceed with High dose procrit, IV iron, FA, MVI, Vit. C. He is ok with IV PPI for possible Upper GI source of chronic blood loss. He currently is not interested in EGD/Colonoscopy. He was advised to consider this to at least address source of bleeding to reduce cont. blood loss. Patient with baseline Hbg of 6-7 with old indicis showing microcytic/hypochromic likely from chronic blood loss vs underlying thalassemia. Patient currently is declining PRBC transfusion under any circumstances. He is well aware of the risks of MI/CVA/and or sudden death. He is currently hemodynamically stable, has no acute EKG changes and has capacity to make his own decisions. His Daughter was in ED earlier and is aware of his decision. He is willing to proceed with High dose procrit, IV iron, FA, MVI, Vit. C. He is ok with IV PPI for possible Upper GI source of chronic blood loss. He currently is not interested in EGD/Colonoscopy. He was advised to consider this to at least address source of bleeding to reduce cont. blood loss. Avoid any unnecessary blood draws.

## 2018-11-09 NOTE — CONSULT NOTE ADULT - SUBJECTIVE AND OBJECTIVE BOX
[facility]                                   [address] [place] [address]                                                      PHONE: [phone]                                                         FAX: [phone]  [email or web address]  ---------------------------------------------------------------------------------------------------------------------------------    Reason for Consult: AR rosa    HPI:  ANTOINETTE WILSON is an 68y Male with Hx of DM CKD referred to ED after his Hb was found low presently Hb 4.7 patient is Jehovah witness reported an episode of chest tightness yesterday that lasted for 20 minutes found to have a minimally elevated troponin at 0.07 . He had a negative cardiac work up by Dr Luna recently as per history.        PAST MEDICAL & SURGICAL HISTORY:  Urinary retention  Other secondary cataract of both eyes  CKD (chronic kidney disease) stage 3, GFR 30-59 ml/min  Chronic anemia  Diabetes  No significant past surgical history      amoxicillin (Rash)      MEDICATIONS  (STANDING):  ascorbic acid 500 milliGRAM(s) Oral daily  cyanocobalamin 1000 MICROGram(s) Oral daily  dextrose 5%. 1000 milliLiter(s) (50 mL/Hr) IV Continuous <Continuous>  dextrose 50% Injectable 12.5 Gram(s) IV Push once  epoetin daniel Injectable 51711 Unit(s) SubCutaneous <User Schedule>  folic acid 1 milliGRAM(s) Oral daily  insulin lispro (HumaLOG) corrective regimen sliding scale   SubCutaneous Before meals and at bedtime  iron sucrose IVPB 200 milliGRAM(s) IV Intermittent once  multivitamin 1 Tablet(s) Oral daily  pantoprazole Infusion 8 mG/Hr (10 mL/Hr) IV Continuous <Continuous>  sodium chloride 0.9% lock flush 3 milliLiter(s) IV Push every 8 hours  sodium chloride 0.9%. 1000 milliLiter(s) (75 mL/Hr) IV Continuous <Continuous>  tamsulosin 0.8 milliGRAM(s) Oral at bedtime  trimethoprim  160 mG/sulfamethoxazole 800 mG 1 Tablet(s) Oral two times a day    MEDICATIONS  (PRN):  dextrose 40% Gel 15 Gram(s) Oral once PRN Blood Glucose LESS THAN 70 milliGRAM(s)/deciliter  glucagon  Injectable 1 milliGRAM(s) IntraMuscular once PRN Glucose LESS THAN 70 milligrams/deciliter  ondansetron Injectable 4 milliGRAM(s) IV Push every 6 hours PRN Nausea      Social History:  Cigarettes:                    Alchohol:                 Illicit Drug Abuse:      REVIEW OF SYSTEMS:    Constitutional: No fever, weight loss or fatigue  Eyes: No eye pain, visual disturbances, or discharge  ENMT:  No difficulty hearing, tinnitus, vertigo; No sinus or throat pain  Neck: No pain or stiffness  Respiratory: No cough, wheezing, chills or hemoptysis  Cardiovascular: No chest pain, palpitations, shortness of breath, dizziness or leg swelling  Gastrointestinal: No abdominal or epigastric pain. No nausea, vomiting or hematemesis; No diarrhea or constipation. No melena or hematochezia.  Genitourinary: No dysuria, frequency, hematuria or incontinence  Rectal: No pain, hemorrhoids or incontinence  Neurological: No headaches, memory loss, loss of strength, numbness or tremors  Skin: No itching, burning, rashes or lesions   Lymph Nodes: No enlarged glands  Endocrine: No heat or cold intolerance; No hair loss  Musculoskeletal: No joint pain or swelling; No muscle, back or extremity pain  Psychiatric: No depression, anxiety, mood swings or difficulty sleeping  Heme/Lymph: No easy bruising or bleeding gums  Allergy and Immunologic: No hives or eczema    Vital Signs Last 24 Hrs  T(C): 36.8 (2018 07:54), Max: 36.8 (2018 19:26)  T(F): 98.3 (2018 07:54), Max: 98.3 (2018 19:26)  HR: 74 (2018 07:54) (73 - 92)  BP: 130/70 (2018 07:54) (119/63 - 137/73)  BP(mean): 92 (2018 02:31) (92 - 92)  RR: 18 (2018 07:54) (18 - 18)  SpO2: 98% (2018 07:54) (97% - 100%)  ICU Vital Signs Last 24 Hrs  ANTOINETTE WILSON  I&O's Detail    2018 07:01  -  2018 07:00  --------------------------------------------------------  IN:  Total IN: 0 mL    OUT:    Voided: 1000 mL  Total OUT: 1000 mL    Total NET: -1000 mL        I&O's Summary    2018 07:01  -  2018 07:00  --------------------------------------------------------  IN: 0 mL / OUT: 1000 mL / NET: -1000 mL      Drug Dosing Weight  ANTOINETTE WILSON      PHYSICAL EXAM:  General: Appears well developed, well nourished alert and cooperative.  HEENT: Head; normocephalic, atraumatic.  Eyes: Pupils reactive, cornea wnl.  Neck: Supple, no nodes adenopathy, no NVD or carotid bruit or thyromegaly.  CARDIOVASCULAR: Normal S1 and S2, No murmur, rub, gallop or lift.   LUNGS: No rales, rhonchi or wheeze. Normal breath sounds bilaterally.  ABDOMEN: Soft, nontender without mass or organomegaly. bowel sounds normoactive.  EXTREMITIES: No clubbing, cyanosis or edema. Distal pulses wnl.   SKIN: warm and dry with normal turgor.  NEURO: Alert/oriented x 3/normal motor exam. No pathologic reflexes.    PSYCH: normal affect.        LABS:                        4.7    6.0   )-----------( 262      ( 2018 22:18 )             15.5     11-08    137  |  104  |  24.0<H>  ----------------------------<  156<H>  4.8   |  20.0<L>  |  2.49<H>    Ca    8.5<L>      2018 22:18  Mg     2.0     11-    TPro  6.9  /  Alb  3.9  /  TBili  0.4  /  DBili  x   /  AST  24  /  ALT  10  /  AlkPhos  62  11-    ANTOINETTE WILSON  CARDIAC MARKERS ( 2018 22:18 )  x     / 0.07 ng/mL / x     / x     / x          PT/INR - ( 2018 22:18 )   PT: 14.2 sec;   INR: 1.23 ratio         PTT - ( 2018 22:18 )  PTT:25.7 sec  Urinalysis Basic - ( 2018 22:39 )    Color: Yellow / Appearance: Clear / S.010 / pH: x  Gluc: x / Ketone: Negative  / Bili: Negative / Urobili: Negative mg/dL   Blood: x / Protein: Negative mg/dL / Nitrite: Negative   Leuk Esterase: Negative / RBC: x / WBC x   Sq Epi: x / Non Sq Epi: x / Bacteria: x        RADIOLOGY & ADDITIONAL STUDIES:    INTERPRETATION OF TELEMETRY (personally reviewed):    ECG: NSR SEPTAL Q Waves      ACTIVE PROBLEMS:  HEALTH ISSUES - PROBLEM Dx:  Other secondary cataract of both eyes: Other secondary cataract of both eyes  Urinary retention: Urinary retention  Type 2 diabetes mellitus with diabetic nephropathy, without long-term current use of insulin: Type 2 diabetes mellitus with diabetic nephropathy, without long-term current use of insulin  CKD (chronic kidney disease) stage 3, GFR 30-59 ml/min: CKD (chronic kidney disease) stage 3, GFR 30-59 ml/min  Other chest pain: Other chest pain  Iron deficiency anemia due to chronic blood loss: Iron deficiency anemia due to chronic blood loss          Assessment and Plan:    In summary,   ANTOINETTE WILSON is an 68y Male with Hx of DM CKD referred to ED after his Hb was found low presently Hb 4.7 patient is Jehovah witness reported an episode of chest tightness yesterday that lasted for 20 minutes found to have a minimally elevated troponin at 0.07 . He had a negative cardiac work up by Dr Luna recently as per history.        Telemetry monitoring  Serial cardiac markers and EKgs  will get records from his cardiologist  2DEchocardiogram  Continue present cardiac therapy    LIZA DEVI MD, FACC, [facility]

## 2018-11-10 LAB
ALBUMIN SERPL ELPH-MCNC: 3.4 G/DL — SIGNIFICANT CHANGE UP (ref 3.3–5.2)
ALP SERPL-CCNC: 65 U/L — SIGNIFICANT CHANGE UP (ref 40–120)
ALT FLD-CCNC: 9 U/L — SIGNIFICANT CHANGE UP
ANION GAP SERPL CALC-SCNC: 11 MMOL/L — SIGNIFICANT CHANGE UP (ref 5–17)
AST SERPL-CCNC: 24 U/L — SIGNIFICANT CHANGE UP
BILIRUB SERPL-MCNC: 0.4 MG/DL — SIGNIFICANT CHANGE UP (ref 0.4–2)
BUN SERPL-MCNC: 19 MG/DL — SIGNIFICANT CHANGE UP (ref 8–20)
CALCIUM SERPL-MCNC: 8.5 MG/DL — LOW (ref 8.6–10.2)
CHLORIDE SERPL-SCNC: 110 MMOL/L — HIGH (ref 98–107)
CO2 SERPL-SCNC: 21 MMOL/L — LOW (ref 22–29)
CREAT SERPL-MCNC: 2.28 MG/DL — HIGH (ref 0.5–1.3)
GLUCOSE BLDC GLUCOMTR-MCNC: 120 MG/DL — HIGH (ref 70–99)
GLUCOSE BLDC GLUCOMTR-MCNC: 142 MG/DL — HIGH (ref 70–99)
GLUCOSE BLDC GLUCOMTR-MCNC: 164 MG/DL — HIGH (ref 70–99)
GLUCOSE BLDC GLUCOMTR-MCNC: 176 MG/DL — HIGH (ref 70–99)
GLUCOSE SERPL-MCNC: 112 MG/DL — SIGNIFICANT CHANGE UP (ref 70–115)
HCT VFR BLD CALC: 16.4 % — CRITICAL LOW (ref 42–52)
HGB BLD-MCNC: 4.8 G/DL — CRITICAL LOW (ref 14–18)
MCHC RBC-ENTMCNC: 18.6 PG — LOW (ref 27–31)
MCHC RBC-ENTMCNC: 29.3 G/DL — LOW (ref 32–36)
MCV RBC AUTO: 63.6 FL — LOW (ref 80–94)
PLATELET # BLD AUTO: 278 K/UL — SIGNIFICANT CHANGE UP (ref 150–400)
POTASSIUM SERPL-MCNC: 4.9 MMOL/L — SIGNIFICANT CHANGE UP (ref 3.5–5.3)
POTASSIUM SERPL-SCNC: 4.9 MMOL/L — SIGNIFICANT CHANGE UP (ref 3.5–5.3)
PROT SERPL-MCNC: 6.4 G/DL — LOW (ref 6.6–8.7)
RBC # BLD: 2.58 M/UL — LOW (ref 4.6–6.2)
RBC # FLD: 17.6 % — HIGH (ref 11–15.6)
SODIUM SERPL-SCNC: 142 MMOL/L — SIGNIFICANT CHANGE UP (ref 135–145)
TROPONIN T SERPL-MCNC: 0.1 NG/ML — HIGH (ref 0–0.06)
WBC # BLD: 6.7 K/UL — SIGNIFICANT CHANGE UP (ref 4.8–10.8)
WBC # FLD AUTO: 6.7 K/UL — SIGNIFICANT CHANGE UP (ref 4.8–10.8)

## 2018-11-10 PROCEDURE — 93306 TTE W/DOPPLER COMPLETE: CPT | Mod: 26

## 2018-11-10 PROCEDURE — 99232 SBSQ HOSP IP/OBS MODERATE 35: CPT

## 2018-11-10 RX ORDER — PANTOPRAZOLE SODIUM 20 MG/1
40 TABLET, DELAYED RELEASE ORAL DAILY
Qty: 0 | Refills: 0 | Status: DISCONTINUED | OUTPATIENT
Start: 2018-11-10 | End: 2018-11-11

## 2018-11-10 RX ORDER — DOCUSATE SODIUM 100 MG
100 CAPSULE ORAL DAILY
Qty: 0 | Refills: 0 | Status: DISCONTINUED | OUTPATIENT
Start: 2018-11-10 | End: 2018-11-13

## 2018-11-10 RX ADMIN — ERYTHROPOIETIN 40000 UNIT(S): 10000 INJECTION, SOLUTION INTRAVENOUS; SUBCUTANEOUS at 02:42

## 2018-11-10 RX ADMIN — Medication 250 MILLIGRAM(S): at 05:16

## 2018-11-10 RX ADMIN — PANTOPRAZOLE SODIUM 10 MG/HR: 20 TABLET, DELAYED RELEASE ORAL at 02:40

## 2018-11-10 RX ADMIN — Medication 250 MILLIGRAM(S): at 17:24

## 2018-11-10 RX ADMIN — Medication 100 MILLIGRAM(S): at 12:38

## 2018-11-10 RX ADMIN — Medication 500 MILLIGRAM(S): at 10:07

## 2018-11-10 RX ADMIN — SODIUM CHLORIDE 3 MILLILITER(S): 9 INJECTION INTRAMUSCULAR; INTRAVENOUS; SUBCUTANEOUS at 05:15

## 2018-11-10 RX ADMIN — Medication 1 TABLET(S): at 17:24

## 2018-11-10 RX ADMIN — TAMSULOSIN HYDROCHLORIDE 0.8 MILLIGRAM(S): 0.4 CAPSULE ORAL at 21:25

## 2018-11-10 RX ADMIN — Medication 25 MILLIGRAM(S): at 17:24

## 2018-11-10 RX ADMIN — Medication 1 TABLET(S): at 10:07

## 2018-11-10 RX ADMIN — Medication 1 TABLET(S): at 05:16

## 2018-11-10 RX ADMIN — PREGABALIN 1000 MICROGRAM(S): 225 CAPSULE ORAL at 10:07

## 2018-11-10 RX ADMIN — Medication 25 MILLIGRAM(S): at 05:16

## 2018-11-10 RX ADMIN — Medication 2: at 12:37

## 2018-11-10 RX ADMIN — SODIUM CHLORIDE 3 MILLILITER(S): 9 INJECTION INTRAMUSCULAR; INTRAVENOUS; SUBCUTANEOUS at 21:22

## 2018-11-10 RX ADMIN — Medication 1 MILLIGRAM(S): at 10:07

## 2018-11-10 RX ADMIN — SODIUM CHLORIDE 3 MILLILITER(S): 9 INJECTION INTRAMUSCULAR; INTRAVENOUS; SUBCUTANEOUS at 13:14

## 2018-11-10 RX ADMIN — SODIUM CHLORIDE 75 MILLILITER(S): 9 INJECTION INTRAMUSCULAR; INTRAVENOUS; SUBCUTANEOUS at 05:17

## 2018-11-10 RX ADMIN — Medication 2: at 21:25

## 2018-11-10 NOTE — PROGRESS NOTE ADULT - SUBJECTIVE AND OBJECTIVE BOX
NEPHROLOGY INTERVAL HPI/OVERNIGHT EVENTS:  HPI:  67 y/o male with history of DM-2 not on meds, legally blind due to B/L Cataracts for which he has declined surgery, Urinary retention with chronic bourne and chronic suppressive abx therapy with last bourne change on , chronic anemia with baseline Hbg of 6-7 based on o/p records, CKD-3 Patient saw his PMD yesterday for his "routine" f/u appt. Denies any other new symptoms. He was sent for blood work and was noted to have a hbg of 5.0 Patient unable to tell if he is having any dark BM or BRBPR as he is legally blind. He got a call from his PMD notifying him of this result and was told to call EMS to go to the nearest ER. As per EMS sheets patient got anxious and started have chest pressure when they arrived. He received aspirin 325 x 1 and SL nitro spray with little relief. His symptoms resolved by the time he got to the ED. EKG in the field did not show STEMI, and 2 subsequent ones in ED where negative for acute changes. His Trop. is 07 and his Hbg is noted ot be 4.9. Stool was brown but occult positive. His vitals are stable. Currently he has no complaints. Patient adamantly declining PRBC transfusion. Willing to proceed with bloodless medicine protocol. (2018 02:31)    Follow up CKD    PAST MEDICAL & SURGICAL HISTORY:  Urinary retention  Other secondary cataract of both eyes  CKD (chronic kidney disease) stage 3, GFR 30-59 ml/min  Chronic anemia  Diabetes  No significant past surgical history      MEDICATIONS  (STANDING):  ascorbic acid 500 milliGRAM(s) Oral daily  cyanocobalamin 1000 MICROGram(s) Oral daily  dextrose 5%. 1000 milliLiter(s) (50 mL/Hr) IV Continuous <Continuous>  dextrose 50% Injectable 12.5 Gram(s) IV Push once  docusate sodium 100 milliGRAM(s) Oral daily  epoetin daniel Injectable 68384 Unit(s) SubCutaneous <User Schedule>  folic acid 1 milliGRAM(s) Oral daily  insulin lispro (HumaLOG) corrective regimen sliding scale   SubCutaneous Before meals and at bedtime  metoprolol tartrate 25 milliGRAM(s) Oral two times a day  multivitamin 1 Tablet(s) Oral daily  pantoprazole Infusion 8 mG/Hr (10 mL/Hr) IV Continuous <Continuous>  saccharomyces boulardii 250 milliGRAM(s) Oral two times a day  sodium chloride 0.9% lock flush 3 milliLiter(s) IV Push every 8 hours  sodium chloride 0.9%. 1000 milliLiter(s) (75 mL/Hr) IV Continuous <Continuous>  tamsulosin 0.8 milliGRAM(s) Oral at bedtime  trimethoprim  160 mG/sulfamethoxazole 800 mG 1 Tablet(s) Oral two times a day    MEDICATIONS  (PRN):  dextrose 40% Gel 15 Gram(s) Oral once PRN Blood Glucose LESS THAN 70 milliGRAM(s)/deciliter  glucagon  Injectable 1 milliGRAM(s) IntraMuscular once PRN Glucose LESS THAN 70 milligrams/deciliter  ondansetron Injectable 4 milliGRAM(s) IV Push every 6 hours PRN Nausea      Allergies    amoxicillin (Rash)    Intolerances        Vital Signs Last 24 Hrs  T(C): 36.9 (10 Nov 2018 05:14), Max: 36.9 (10 Nov 2018 05:14)  T(F): 98.4 (10 Nov 2018 05:14), Max: 98.4 (10 Nov 2018 05:14)  HR: 80 (10 Nov 2018 05:14) (76 - 90)  BP: 110/59 (10 Nov 2018 05:14) (110/59 - 133/68)  BP(mean): --  RR: 16 (10 Nov 2018 05:14) (14 - 18)  SpO2: 98% (10 Nov 2018 05:14) (94% - 99%)  Daily     Daily Weight in k.3 (10 Nov 2018 04:53)    PHYSICAL EXAM:  GENERAL: No distress, comfortable  HEAD:  Atraumatic, Normocephalic  EYES: Conjunctiva and sclera clear  ENMT: Moist mucous membranes, Good dentition, No lesions  NECK: Supple, No JVD  NERVOUS SYSTEM:  Alert & Oriented X3, Good concentration; Motor Strength wnl upper and lower extremities  CHEST/LUNG: Clear to percussion bilaterally; No rales, rhonchi, wheezing, or rubs  HEART: Regular rate and rhythm; No murmurs, rubs, or gallops  ABDOMEN: Soft, Nontender, Nondistended; Bowel sounds present  EXTREMITIES:  No edema B/L  LYMPH: No lymphadenopathy noted  SKIN: No rashes or lesions, pale    LABS:                        4.8    6.7   )-----------( 278      ( 10 Nov 2018 05:50 )             16.4     11-10    142  |  110<H>  |  19.0  ----------------------------<  112  4.9   |  21.0<L>  |  2.28<H>    Ca    8.5<L>      10 Nov 2018 05:50  Mg     2.0     -    TPro  6.4<L>  /  Alb  3.4  /  TBili  0.4  /  DBili  x   /  AST  24  /  ALT  9   /  AlkPhos  65  11-10    PT/INR - ( 2018 22:18 )   PT: 14.2 sec;   INR: 1.23 ratio         PTT - ( 2018 22:18 )  PTT:25.7 sec  Urinalysis Basic - ( 2018 22:39 )    Color: Yellow / Appearance: Clear / S.010 / pH: x  Gluc: x / Ketone: Negative  / Bili: Negative / Urobili: Negative mg/dL   Blood: x / Protein: Negative mg/dL / Nitrite: Negative   Leuk Esterase: Negative / RBC: x / WBC x   Sq Epi: x / Non Sq Epi: x / Bacteria: x              RADIOLOGY & ADDITIONAL TESTS:

## 2018-11-10 NOTE — PROGRESS NOTE ADULT - SUBJECTIVE AND OBJECTIVE BOX
[facility], [facility]                                   [address] [place] [address]                                                      PHONE: [phone]                                                         FAX: [phone]  [email or web address]  ---------------------------------------------------------------------------------------------------------------------------------    Reason for Consult: AR rosa    HPI:  ANTOINETTE WILSON is an 68y Male with Hx of DM CKD referred to ED after his Hb was found low presently Hb 4.7 patient is Jehovah witness reported an episode of chest tightness yesterday that lasted for 20 minutes found to have a minimally elevated troponin at 0.07 . He had a negative cardiac work up by Dr Luna recently as per history.        Nuclear scan 2017 reviewed low risk CAD medical management    PAST MEDICAL & SURGICAL HISTORY:  Urinary retention  Other secondary cataract of both eyes  CKD (chronic kidney disease) stage 3, GFR 30-59 ml/min  Chronic anemia  Diabetes  No significant past surgical history      amoxicillin (Rash)      MEDICATIONS  (STANDING):  ascorbic acid 500 milliGRAM(s) Oral daily  cyanocobalamin 1000 MICROGram(s) Oral daily  dextrose 5%. 1000 milliLiter(s) (50 mL/Hr) IV Continuous <Continuous>  dextrose 50% Injectable 12.5 Gram(s) IV Push once  epoetin daniel Injectable 49771 Unit(s) SubCutaneous <User Schedule>  folic acid 1 milliGRAM(s) Oral daily  insulin lispro (HumaLOG) corrective regimen sliding scale   SubCutaneous Before meals and at bedtime  iron sucrose IVPB 200 milliGRAM(s) IV Intermittent once  multivitamin 1 Tablet(s) Oral daily  pantoprazole Infusion 8 mG/Hr (10 mL/Hr) IV Continuous <Continuous>  sodium chloride 0.9% lock flush 3 milliLiter(s) IV Push every 8 hours  sodium chloride 0.9%. 1000 milliLiter(s) (75 mL/Hr) IV Continuous <Continuous>  tamsulosin 0.8 milliGRAM(s) Oral at bedtime  trimethoprim  160 mG/sulfamethoxazole 800 mG 1 Tablet(s) Oral two times a day    MEDICATIONS  (PRN):  dextrose 40% Gel 15 Gram(s) Oral once PRN Blood Glucose LESS THAN 70 milliGRAM(s)/deciliter  glucagon  Injectable 1 milliGRAM(s) IntraMuscular once PRN Glucose LESS THAN 70 milligrams/deciliter  ondansetron Injectable 4 milliGRAM(s) IV Push every 6 hours PRN Nausea      Social History:  Cigarettes:                    Alchohol:                 Illicit Drug Abuse:      REVIEW OF SYSTEMS:    Constitutional: No fever, weight loss or fatigue  Eyes: No eye pain, visual disturbances, or discharge  ENMT:  No difficulty hearing, tinnitus, vertigo; No sinus or throat pain  Neck: No pain or stiffness  Respiratory: No cough, wheezing, chills or hemoptysis  Cardiovascular: No chest pain, palpitations, shortness of breath, dizziness or leg swelling  Gastrointestinal: No abdominal or epigastric pain. No nausea, vomiting or hematemesis; No diarrhea or constipation. No melena or hematochezia.  Genitourinary: No dysuria, frequency, hematuria or incontinence  Rectal: No pain, hemorrhoids or incontinence  Neurological: No headaches, memory loss, loss of strength, numbness or tremors  Skin: No itching, burning, rashes or lesions   Lymph Nodes: No enlarged glands  Endocrine: No heat or cold intolerance; No hair loss  Musculoskeletal: No joint pain or swelling; No muscle, back or extremity pain  Psychiatric: No depression, anxiety, mood swings or difficulty sleeping  Heme/Lymph: No easy bruising or bleeding gums  Allergy and Immunologic: No hives or eczema    Vital Signs Last 24 Hrs  T(C): 36.8 (2018 07:54), Max: 36.8 (2018 19:26)  T(F): 98.3 (2018 07:54), Max: 98.3 (2018 19:26)  HR: 74 (2018 07:54) (73 - 92)  BP: 130/70 (2018 07:54) (119/63 - 137/73)  BP(mean): 92 (2018 02:31) (92 - 92)  RR: 18 (2018 07:54) (18 - 18)  SpO2: 98% (2018 07:54) (97% - 100%)  ICU Vital Signs Last 24 Hrs  ANTOINETTE WILSON  I&O's Detail    2018 07:01  -  2018 07:00  --------------------------------------------------------  IN:  Total IN: 0 mL    OUT:    Voided: 1000 mL  Total OUT: 1000 mL    Total NET: -1000 mL        I&O's Summary    2018 07:01  -  2018 07:00  --------------------------------------------------------  IN: 0 mL / OUT: 1000 mL / NET: -1000 mL      Drug Dosing Weight  ANTOINETTE WILSON      PHYSICAL EXAM:  General: Appears well developed, well nourished alert and cooperative.  HEENT: Head; normocephalic, atraumatic.  Eyes: Pupils reactive, cornea wnl.  Neck: Supple, no nodes adenopathy, no NVD or carotid bruit or thyromegaly.  CARDIOVASCULAR: Normal S1 and S2, No murmur, rub, gallop or lift.   LUNGS: No rales, rhonchi or wheeze. Normal breath sounds bilaterally.  ABDOMEN: Soft, nontender without mass or organomegaly. bowel sounds normoactive.  EXTREMITIES: No clubbing, cyanosis or edema. Distal pulses wnl.   SKIN: warm and dry with normal turgor.  NEURO: Alert/oriented x 3/normal motor exam. No pathologic reflexes.    PSYCH: normal affect.        LABS:                        4.7    6.0   )-----------( 262      ( 2018 22:18 )             15.5     11-08    137  |  104  |  24.0<H>  ----------------------------<  156<H>  4.8   |  20.0<L>  |  2.49<H>    Ca    8.5<L>      2018 22:18  Mg     2.0     11-    TPro  6.9  /  Alb  3.9  /  TBili  0.4  /  DBili  x   /  AST  24  /  ALT  10  /  AlkPhos  62  11-    ANTOINETTE WILSON  CARDIAC MARKERS ( 2018 22:18 )  x     / 0.07 ng/mL / x     / x     / x          PT/INR - ( 2018 22:18 )   PT: 14.2 sec;   INR: 1.23 ratio         PTT - ( 2018 22:18 )  PTT:25.7 sec  Urinalysis Basic - ( 2018 22:39 )    Color: Yellow / Appearance: Clear / S.010 / pH: x  Gluc: x / Ketone: Negative  / Bili: Negative / Urobili: Negative mg/dL   Blood: x / Protein: Negative mg/dL / Nitrite: Negative   Leuk Esterase: Negative / RBC: x / WBC x   Sq Epi: x / Non Sq Epi: x / Bacteria: x        RADIOLOGY & ADDITIONAL STUDIES:    INTERPRETATION OF TELEMETRY (personally reviewed):    ECG: NSR SEPTAL Q Waves      ACTIVE PROBLEMS:  HEALTH ISSUES - PROBLEM Dx:  Other secondary cataract of both eyes: Other secondary cataract of both eyes  Urinary retention: Urinary retention  Type 2 diabetes mellitus with diabetic nephropathy, without long-term current use of insulin: Type 2 diabetes mellitus with diabetic nephropathy, without long-term current use of insulin  CKD (chronic kidney disease) stage 3, GFR 30-59 ml/min: CKD (chronic kidney disease) stage 3, GFR 30-59 ml/min  Other chest pain: Other chest pain  Iron deficiency anemia due to chronic blood loss: Iron deficiency anemia due to chronic blood loss          Assessment and Plan:    In summary,   ANTOINETTE WILSON is an 68y Male with Hx of DM CKD referred to ED after his Hb was found low presently Hb 4.7 patient is Jehovah witness reported an episode of chest tightness yesterday that lasted for 20 minutes found to have a minimally elevated troponin at 0.07 . He had a negative cardiac work up by Dr Luna recently as per history.        Nuclear scan 2017 reviewed low risk CAD for which medical management was recommended        Telemetry monitoring  Serial cardiac markers and EKgs  2DEchocardiogram  Continue present cardiac therapy    LIZA DEVI MD, FACC, [facility]

## 2018-11-10 NOTE — PROGRESS NOTE ADULT - ASSESSMENT
CKD Stage 4 (Fluctuates between 3b and 4 based on CKD-EPI calculation for GFR)  Severe Anemia  GIB  DM2    -Renal indices are stable at baseline; electrolytes are stable; No acidosis; No evidence of Uremia  -Agree with Procrit and IV Iron  -The patient refuses blood transfusions despite counseling  -GI and heme follow up noted     Thank you

## 2018-11-10 NOTE — PROGRESS NOTE ADULT - ASSESSMENT
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  GI, CARDIO AND HEMATOLOGY following     Problem/Plan - 1:  ·  Problem: Iron deficiency anemia due to chronic blood loss.    Plan: Patient with baseline Hbg of 6-7 with old indicis showing microcytic/hypochromic likely from chronic blood loss vs underlying thalassemia.   Patient currently is declining PRBC transfusion under any circumstances. He is well aware of the risks of MI/CVA/and or sudden death. He is currently hemodynamically stable, has no acute EKG changes and has capacity to make his own decisions. His Daughter  is aware of his decision.   He is willing to proceed with High dose procrit, IV iron, FA, MVI, Vit. C.   He is ok with IV PPI for possible Upper GI source of chronic blood loss. He currently is not interested in EGD/Colonoscopy. He was advised to consider this to at least address source of bleeding to reduce cont. blood loss. Avoid any unnecessary blood draws.  GI, hematology and Cardiology and nephrology consulted  c/w iv iron   continue procirt  diet started     Problem/Plan - 2:  ·  Problem: Other chest pain.  Plan: Trop likely multifactorial from CKD, supply/demand mismatch from severe anemia. Will start on low dose BB to reduce any tachycardia making it worse since patient declining transfusion. He did receive aspirin in the field which I will not cont. in setting of anemia and occult pos stool.   TTE ordered  cardiology following     Problem/Plan - 3:  ·  Problem: CKD (chronic kidney disease) stage 3, GFR 30-59 ml/min.  Plan: At baseline based on previous o/p records. Avoid nephrotoxic drugs.   Nephrology following     Problem/Plan - 4:  ·  Problem: Type 2 diabetes mellitus with diabetic nephropathy, without long-term current use of insulin.  Plan: Patient declines to use medications or check his FS at home. Cont. HISS/Accuchecks while in hospital.   DASH diet     Problem/Plan - 5:  ·  Problem: Urinary retention.  Plan: Cont. bourne as per Urology, cont. prophylactic Bactrim ordered by Dr. Fonseca.   continue flomax and bourne re inserted on 11/9 for urinary retention, bladder sonogram 600 ml.  --> maintain bourne      Problem/Plan - 6:  Problem: Other secondary cataract of both eyes. Plan: Likely due to poorly controlled DM. Patient not interested in cataract surgery. Cont. fall risk/ambulate with assistance.    dispo -- will not check cbc tomorrow due to anemia  palliative following

## 2018-11-10 NOTE — PROGRESS NOTE ADULT - SUBJECTIVE AND OBJECTIVE BOX
ANTOINETTE WILSON    [id number]    68y      Male    INTERVAL HPI/OVERNIGHT EVENTS:    patient being seen for severe anemia and med management. patient seen at bedside and denies any complaints    morning hemoglobin is decreased    patient denies any comaplints    REVIEW OF SYSTEMS:    CONSTITUTIONAL: No fever, weight loss, or fatigue  RESPIRATORY: No cough, wheezing, hemoptysis; No shortness of breath  CARDIOVASCULAR: No chest pain, palpitations  GASTROINTESTINAL: No abdominal or epigastric pain. No nausea, vomiting  NEUROLOGICAL: No headaches, memory loss, loss of strength.  MISCELLANEOUS:      Vital Signs Last 24 Hrs  T(C): 36.9 (10 Nov 2018 05:14), Max: 36.9 (10 Nov 2018 05:14)  T(F): 98.4 (10 Nov 2018 05:14), Max: 98.4 (10 Nov 2018 05:14)  HR: 80 (10 Nov 2018 05:14) (76 - 90)  BP: 110/59 (10 Nov 2018 05:14) (110/59 - 133/68)  BP(mean): --  RR: 16 (10 Nov 2018 05:14) (14 - 18)  SpO2: 98% (10 Nov 2018 05:14) (90% - 99%)    PHYSICAL EXAM:    GENERAL: NAD, well-groomed,   NECK: soft, Supple, No JVD,   CHEST/LUNG: Clear to auscultation bilaterally; No wheezing  HEART: S1S2+, Regular rate and rhythm; No murmurs  ABDOMEN: Soft, Nontender, distended; Bowel sounds present,  EXTREMITIES:  2+ Peripheral Pulses, No edema, chronic bourne   SKIN: No rashes or lesions  NEURO: AAOX3, no focal deficits, no motor r sensory loss  PSYCH: normal mood      LABS:                        4.8    6.7   )-----------( 278      ( 10 Nov 2018 05:50 )             16.4     11-10    142  |  110<H>  |  19.0  ----------------------------<  112  4.9   |  21.0<L>  |  2.28<H>    Ca    8.5<L>      10 Nov 2018 05:50  Mg     2.0     11-08    TPro  6.4<L>  /  Alb  3.4  /  TBili  0.4  /  DBili  x   /  AST  24  /  ALT  9   /  AlkPhos  65  11-10    PT/INR - ( 2018 22:18 )   PT: 14.2 sec;   INR: 1.23 ratio         PTT - ( 2018 22:18 )  PTT:25.7 sec  Urinalysis Basic - ( 2018 22:39 )    Color: Yellow / Appearance: Clear / S.010 / pH: x  Gluc: x / Ketone: Negative  / Bili: Negative / Urobili: Negative mg/dL   Blood: x / Protein: Negative mg/dL / Nitrite: Negative   Leuk Esterase: Negative / RBC: x / WBC x   Sq Epi: x / Non Sq Epi: x / Bacteria: x          MEDICATIONS  (STANDING):  ascorbic acid 500 milliGRAM(s) Oral daily  cyanocobalamin 1000 MICROGram(s) Oral daily  dextrose 5%. 1000 milliLiter(s) (50 mL/Hr) IV Continuous <Continuous>  dextrose 50% Injectable 12.5 Gram(s) IV Push once  epoetin daniel Injectable 70348 Unit(s) SubCutaneous <User Schedule>  folic acid 1 milliGRAM(s) Oral daily  insulin lispro (HumaLOG) corrective regimen sliding scale   SubCutaneous Before meals and at bedtime  metoprolol tartrate 25 milliGRAM(s) Oral two times a day  multivitamin 1 Tablet(s) Oral daily  pantoprazole Infusion 8 mG/Hr (10 mL/Hr) IV Continuous <Continuous>  saccharomyces boulardii 250 milliGRAM(s) Oral two times a day  sodium chloride 0.9% lock flush 3 milliLiter(s) IV Push every 8 hours  sodium chloride 0.9%. 1000 milliLiter(s) (75 mL/Hr) IV Continuous <Continuous>  tamsulosin 0.8 milliGRAM(s) Oral at bedtime  trimethoprim  160 mG/sulfamethoxazole 800 mG 1 Tablet(s) Oral two times a day    MEDICATIONS  (PRN):  dextrose 40% Gel 15 Gram(s) Oral once PRN Blood Glucose LESS THAN 70 milliGRAM(s)/deciliter  glucagon  Injectable 1 milliGRAM(s) IntraMuscular once PRN Glucose LESS THAN 70 milligrams/deciliter  ondansetron Injectable 4 milliGRAM(s) IV Push every 6 hours PRN Nausea      RADIOLOGY & ADDITIONAL TESTS:

## 2018-11-11 LAB
ALBUMIN SERPL ELPH-MCNC: 3.2 G/DL — LOW (ref 3.3–5.2)
ALP SERPL-CCNC: 63 U/L — SIGNIFICANT CHANGE UP (ref 40–120)
ALT FLD-CCNC: 10 U/L — SIGNIFICANT CHANGE UP
ANION GAP SERPL CALC-SCNC: 10 MMOL/L — SIGNIFICANT CHANGE UP (ref 5–17)
AST SERPL-CCNC: 23 U/L — SIGNIFICANT CHANGE UP
BILIRUB SERPL-MCNC: 0.4 MG/DL — SIGNIFICANT CHANGE UP (ref 0.4–2)
BUN SERPL-MCNC: 19 MG/DL — SIGNIFICANT CHANGE UP (ref 8–20)
CALCIUM SERPL-MCNC: 8.3 MG/DL — LOW (ref 8.6–10.2)
CHLORIDE SERPL-SCNC: 108 MMOL/L — HIGH (ref 98–107)
CO2 SERPL-SCNC: 20 MMOL/L — LOW (ref 22–29)
CREAT SERPL-MCNC: 2.66 MG/DL — HIGH (ref 0.5–1.3)
GLUCOSE BLDC GLUCOMTR-MCNC: 122 MG/DL — HIGH (ref 70–99)
GLUCOSE BLDC GLUCOMTR-MCNC: 143 MG/DL — HIGH (ref 70–99)
GLUCOSE BLDC GLUCOMTR-MCNC: 151 MG/DL — HIGH (ref 70–99)
GLUCOSE BLDC GLUCOMTR-MCNC: 152 MG/DL — HIGH (ref 70–99)
GLUCOSE SERPL-MCNC: 113 MG/DL — SIGNIFICANT CHANGE UP (ref 70–115)
HCT VFR BLD CALC: 15.6 % — CRITICAL LOW (ref 42–52)
HGB BLD-MCNC: 4.7 G/DL — CRITICAL LOW (ref 14–18)
MCHC RBC-ENTMCNC: 19.6 PG — LOW (ref 27–31)
MCHC RBC-ENTMCNC: 30.1 G/DL — LOW (ref 32–36)
MCV RBC AUTO: 65 FL — LOW (ref 80–94)
PLATELET # BLD AUTO: 253 K/UL — SIGNIFICANT CHANGE UP (ref 150–400)
POTASSIUM SERPL-MCNC: 5.1 MMOL/L — SIGNIFICANT CHANGE UP (ref 3.5–5.3)
POTASSIUM SERPL-SCNC: 5.1 MMOL/L — SIGNIFICANT CHANGE UP (ref 3.5–5.3)
PROT SERPL-MCNC: 6.4 G/DL — LOW (ref 6.6–8.7)
RBC # BLD: 2.4 M/UL — LOW (ref 4.6–6.2)
RBC # FLD: 18.4 % — HIGH (ref 11–15.6)
SODIUM SERPL-SCNC: 138 MMOL/L — SIGNIFICANT CHANGE UP (ref 135–145)
WBC # BLD: 6.6 K/UL — SIGNIFICANT CHANGE UP (ref 4.8–10.8)
WBC # FLD AUTO: 6.6 K/UL — SIGNIFICANT CHANGE UP (ref 4.8–10.8)

## 2018-11-11 PROCEDURE — 99232 SBSQ HOSP IP/OBS MODERATE 35: CPT

## 2018-11-11 RX ORDER — IRON SUCROSE 20 MG/ML
200 INJECTION, SOLUTION INTRAVENOUS EVERY 24 HOURS
Qty: 0 | Refills: 0 | Status: DISCONTINUED | OUTPATIENT
Start: 2018-11-11 | End: 2018-11-13

## 2018-11-11 RX ORDER — ACETAMINOPHEN 500 MG
650 TABLET ORAL ONCE
Qty: 0 | Refills: 0 | Status: COMPLETED | OUTPATIENT
Start: 2018-11-11 | End: 2018-11-11

## 2018-11-11 RX ORDER — PANTOPRAZOLE SODIUM 20 MG/1
40 TABLET, DELAYED RELEASE ORAL
Qty: 0 | Refills: 0 | Status: DISCONTINUED | OUTPATIENT
Start: 2018-11-11 | End: 2018-11-13

## 2018-11-11 RX ADMIN — Medication 1 TABLET(S): at 08:41

## 2018-11-11 RX ADMIN — Medication 25 MILLIGRAM(S): at 05:17

## 2018-11-11 RX ADMIN — ERYTHROPOIETIN 40000 UNIT(S): 10000 INJECTION, SOLUTION INTRAVENOUS; SUBCUTANEOUS at 02:11

## 2018-11-11 RX ADMIN — Medication 1 TABLET(S): at 05:17

## 2018-11-11 RX ADMIN — TAMSULOSIN HYDROCHLORIDE 0.8 MILLIGRAM(S): 0.4 CAPSULE ORAL at 21:26

## 2018-11-11 RX ADMIN — Medication 250 MILLIGRAM(S): at 17:38

## 2018-11-11 RX ADMIN — SODIUM CHLORIDE 3 MILLILITER(S): 9 INJECTION INTRAMUSCULAR; INTRAVENOUS; SUBCUTANEOUS at 20:42

## 2018-11-11 RX ADMIN — Medication 650 MILLIGRAM(S): at 12:01

## 2018-11-11 RX ADMIN — Medication 2: at 21:26

## 2018-11-11 RX ADMIN — IRON SUCROSE 110 MILLIGRAM(S): 20 INJECTION, SOLUTION INTRAVENOUS at 12:02

## 2018-11-11 RX ADMIN — PANTOPRAZOLE SODIUM 40 MILLIGRAM(S): 20 TABLET, DELAYED RELEASE ORAL at 17:38

## 2018-11-11 RX ADMIN — Medication 25 MILLIGRAM(S): at 17:38

## 2018-11-11 RX ADMIN — Medication 1 TABLET(S): at 17:38

## 2018-11-11 RX ADMIN — PREGABALIN 1000 MICROGRAM(S): 225 CAPSULE ORAL at 08:41

## 2018-11-11 RX ADMIN — Medication 500 MILLIGRAM(S): at 08:41

## 2018-11-11 RX ADMIN — Medication 1 MILLIGRAM(S): at 08:41

## 2018-11-11 RX ADMIN — Medication 100 MILLIGRAM(S): at 08:41

## 2018-11-11 RX ADMIN — Medication 650 MILLIGRAM(S): at 13:00

## 2018-11-11 RX ADMIN — PANTOPRAZOLE SODIUM 40 MILLIGRAM(S): 20 TABLET, DELAYED RELEASE ORAL at 08:41

## 2018-11-11 RX ADMIN — Medication 250 MILLIGRAM(S): at 05:17

## 2018-11-11 RX ADMIN — Medication 2: at 12:11

## 2018-11-11 RX ADMIN — SODIUM CHLORIDE 3 MILLILITER(S): 9 INJECTION INTRAMUSCULAR; INTRAVENOUS; SUBCUTANEOUS at 14:44

## 2018-11-11 RX ADMIN — SODIUM CHLORIDE 3 MILLILITER(S): 9 INJECTION INTRAMUSCULAR; INTRAVENOUS; SUBCUTANEOUS at 05:17

## 2018-11-11 NOTE — PROVIDER CONTACT NOTE (CRITICAL VALUE NOTIFICATION) - ACTION/TREATMENT ORDERED:
CRITICAL VALUE RECEIVED. FEI BOWERS MADE AWARE. PATIENT CONT TO REFUSE BLOOD TRANSFUSION. NO NEW INTERVENTIONS AT THIS TIME.

## 2018-11-11 NOTE — PROGRESS NOTE ADULT - ASSESSMENT
59 y/o male with PMH Dm2, legal blindness sec to cataracts, chronic anemia, chronic urinary retention presenting with with acute on chronic anemia in setting of occult pos stool + elevated troponin       Iron deficiency anemia due to chronic blood loss.    stable, hgb remaining in 4s  acute on chronic condition  baseline Hbg of 6-7 with old indicis showing microcytic/hypochromic likely from chronic blood loss vs underlying thalassemia.   discussed @ length with patient to confirm prior documentation, agree pt understands risks (including sudden death) vs benefits of blood product transfusion but is refusing due to personal preference, is NOT Religious, pt with full understanding of situation, can recall and verbalize, has capacity to refuse   procrit, IV iron, FA, MVI, Vit. C, ppi  possible Upper GI source of chronic blood loss, Gi eval appreciated, refusing inpatient scope, considering it outpt but per GI must have hgb 7 prior to intervention, unclear he will reach this goal with supportive care alone  minimal blood draws  will need close heme onc + nephro fu to cont dosing procrit + venofer outpatient  ferrous sulfate TID with vit c on dc  PT eval pending, if cleared will dc home with close fu  cont monitor while inpatient as pt full code, may dc if hgb reaches 7      Chest pain.   resolved  assoc with elevated trops from demand ischemia in setting of severe anemia cont low dose BB started this admission to reduce hypovolemic induced tachycardia   no asa in setting of anemia and occult pos stool.   TTE reviewed, unremarkable  cardiology cs appreciated    Demand Ischemia  sec to severe anemia  as above      CKD 3/4  stable, chronic  At baseline based on previous o/p records. Avoid nephrotoxic drugs.   Nephrology cs appreciated      Type 2 diabetes mellitus with diabetic nephropathy, without long-term current use of insulin.    non compliant with checking BS or taking meds outpatient  HISS/Accuchecks while in hospital.       Urinary retention.   chronic, stable  has chronic bourne to be continued per urology, reinserted on 11/9 for retention of 600ml  cont prophylactic Bactrim per urology   continue flomax      Cataracts BL Eyes  complicated by blindness  likely sec to poorly controlled DM, patient not interested in cataract surgery. Cont. fall risk/ambulate with assistance.    dispo -- palliative following. Pending PT for dispo

## 2018-11-11 NOTE — PROGRESS NOTE ADULT - ASSESSMENT
CKD Stage 4 (Fluctuates between 3b and 4 based on CKD-EPI calculation for GFR)  Severe Anemia  GIB  DM2    -Renal indices are stable at baseline; electrolytes are stable; No acidosis; No evidence of Uremia  -Agree with Procrit and IV Iron  -The patient refuses blood transfusions despite counseling  -GI and heme follow up noted     No renal objection to d/c. Will need close outpatient follow up  D/w      Thank you

## 2018-11-11 NOTE — PROGRESS NOTE ADULT - SUBJECTIVE AND OBJECTIVE BOX
PMD: Dr Whitney Jones  Urology Dr ramírez    Patient seen and examined at the bedside. No acute overnight events. Critical low hgb yesterday. Complaining of nothing this AM. Denies fever/chills, headache, lightheadedness, dizziness, chest pain, palpitations, shortness of breath, cough, abd pain, nausea/vomiting/diarrhea, muscle pain. Admits to not leaving bed since admission.       =========================================================================================  T(C): 36.7 (18 @ 15:39), Max: 37.7 (11-10-18 @ 21:17)  HR: 75 (18 @ 15:39) (75 - 91)  BP: 117/68 (18 @ 15:39) (117/68 - 139/78)  RR: 18 (18 @ 15:39) (16 - 20)  SpO2: 98% (18 @ 05:14) (98% - 99%)    PHYSICAL EXAM.    GEN - appears age appropriate. well nourished. pleasant. no distress.   HEENT - NCAT, BL cataracts,   RESP - CTA BL, no wheeze/stridor/rhonchi/crackles. not on supplemental O2. able to speak in full sentences without distress.   CARDIO - NS1S2, RRR. No murmurs/rubs/gallops.  ABD - Soft/Non tender/Non distended. Normal BS x4 quadrants. no guarding/rebound tenderness.  Ext - No JASE.  MSK - BL 5/5 strength on upper and lower extremities.   Neuro - AAOx3. cn 2-12 grossly intact  Psych - normal affect  Skin - c/d/i. no rashes/lesions      I&O's Summary    10 Nov 2018 07:01  -  2018 07:00  --------------------------------------------------------  IN: 940 mL / OUT: 1750 mL / NET: -810 mL    2018 07:01  -  2018 16:20  --------------------------------------------------------  IN: 720 mL / OUT: 0 mL / NET: 720 mL      Daily     Daily Weight in k.7 (2018 04:49)    =========================================================================================  LABS.        138  |  108<H>  |  19.0  ----------------------------<  113  5.1   |  20.0<L>  |  2.66<H>    Ca    8.3<L>      2018 05:50    TPro  6.4<L>  /  Alb  3.2<L>  /  TBili  0.4  /  DBili  x   /  AST  23  /  ALT  10  /  AlkPhos  63                            4.7    6.6   )-----------( 253      ( 2018 05:50 )             15.6     LIVER FUNCTIONS - ( 2018 05:50 )  Alb: 3.2 g/dL / Pro: 6.4 g/dL / ALK PHOS: 63 U/L / ALT: 10 U/L / AST: 23 U/L / GGT: x             CARDIAC MARKERS ( 10 Nov 2018 13:45 )  x     / 0.10 ng/mL / x     / x     / x                =========================================================================================  IMAGING.     =========================================================================================    HOME MEDS.    Home Medications:  Bactrim: 1 tab(s) orally 2 times a day (2018 03:29)      =========================================================================================    HOSPITAL MEDS.    MEDICATIONS  (STANDING):  ascorbic acid 500 milliGRAM(s) Oral daily  cyanocobalamin 1000 MICROGram(s) Oral daily  dextrose 5%. 1000 milliLiter(s) (50 mL/Hr) IV Continuous <Continuous>  dextrose 50% Injectable 12.5 Gram(s) IV Push once  docusate sodium 100 milliGRAM(s) Oral daily  epoetin daniel Injectable 72179 Unit(s) SubCutaneous <User Schedule>  folic acid 1 milliGRAM(s) Oral daily  insulin lispro (HumaLOG) corrective regimen sliding scale   SubCutaneous Before meals and at bedtime  iron sucrose IVPB 200 milliGRAM(s) IV Intermittent every 24 hours  metoprolol tartrate 25 milliGRAM(s) Oral two times a day  multivitamin 1 Tablet(s) Oral daily  pantoprazole    Tablet 40 milliGRAM(s) Oral two times a day  saccharomyces boulardii 250 milliGRAM(s) Oral two times a day  sodium chloride 0.9% lock flush 3 milliLiter(s) IV Push every 8 hours  tamsulosin 0.8 milliGRAM(s) Oral at bedtime  trimethoprim  160 mG/sulfamethoxazole 800 mG 1 Tablet(s) Oral two times a day    MEDICATIONS  (PRN):  dextrose 40% Gel 15 Gram(s) Oral once PRN Blood Glucose LESS THAN 70 milliGRAM(s)/deciliter  glucagon  Injectable 1 milliGRAM(s) IntraMuscular once PRN Glucose LESS THAN 70 milligrams/deciliter  ondansetron Injectable 4 milliGRAM(s) IV Push every 6 hours PRN Nausea

## 2018-11-11 NOTE — PROGRESS NOTE ADULT - NSHPATTENDINGPLANDISCUSS_GEN_ALL_CORE
the patient.  All imaging and results of lab/other studies reviewed by me. All questions answered to the satisfaction of the patient. At this time they agree with the current plan of therapy.
patient, rn and consultants

## 2018-11-11 NOTE — PROGRESS NOTE ADULT - SUBJECTIVE AND OBJECTIVE BOX
NEPHROLOGY INTERVAL HPI/OVERNIGHT EVENTS:  HPI:  69 y/o male with history of DM-2 not on meds, legally blind due to B/L Cataracts for which he has declined surgery, Urinary retention with chronic bourne and chronic suppressive abx therapy with last bourne change on , chronic anemia with baseline Hbg of 6-7 based on o/p records, CKD-3 Patient saw his PMD yesterday for his "routine" f/u appt. Denies any other new symptoms. He was sent for blood work and was noted to have a hbg of 5.0 Patient unable to tell if he is having any dark BM or BRBPR as he is legally blind. He got a call from his PMD notifying him of this result and was told to call EMS to go to the nearest ER. As per EMS sheets patient got anxious and started have chest pressure when they arrived. He received aspirin 325 x 1 and SL nitro spray with little relief. His symptoms resolved by the time he got to the ED. EKG in the field did not show STEMI, and 2 subsequent ones in ED where negative for acute changes. His Trop. is 07 and his Hbg is noted ot be 4.9. Stool was brown but occult positive. His vitals are stable. Currently he has no complaints. Patient adamantly declining PRBC transfusion. Willing to proceed with bloodless medicine protocol. (2018 02:31)    Follow up CKD    PAST MEDICAL & SURGICAL HISTORY:  Urinary retention  Other secondary cataract of both eyes  CKD (chronic kidney disease) stage 3, GFR 30-59 ml/min  Chronic anemia  Diabetes  No significant past surgical history      MEDICATIONS  (STANDING):  ascorbic acid 500 milliGRAM(s) Oral daily  cyanocobalamin 1000 MICROGram(s) Oral daily  dextrose 5%. 1000 milliLiter(s) (50 mL/Hr) IV Continuous <Continuous>  dextrose 50% Injectable 12.5 Gram(s) IV Push once  docusate sodium 100 milliGRAM(s) Oral daily  epoetin daniel Injectable 31520 Unit(s) SubCutaneous <User Schedule>  folic acid 1 milliGRAM(s) Oral daily  insulin lispro (HumaLOG) corrective regimen sliding scale   SubCutaneous Before meals and at bedtime  metoprolol tartrate 25 milliGRAM(s) Oral two times a day  multivitamin 1 Tablet(s) Oral daily  pantoprazole Infusion 8 mG/Hr (10 mL/Hr) IV Continuous <Continuous>  saccharomyces boulardii 250 milliGRAM(s) Oral two times a day  sodium chloride 0.9% lock flush 3 milliLiter(s) IV Push every 8 hours  sodium chloride 0.9%. 1000 milliLiter(s) (75 mL/Hr) IV Continuous <Continuous>  tamsulosin 0.8 milliGRAM(s) Oral at bedtime  trimethoprim  160 mG/sulfamethoxazole 800 mG 1 Tablet(s) Oral two times a day    MEDICATIONS  (PRN):  dextrose 40% Gel 15 Gram(s) Oral once PRN Blood Glucose LESS THAN 70 milliGRAM(s)/deciliter  glucagon  Injectable 1 milliGRAM(s) IntraMuscular once PRN Glucose LESS THAN 70 milligrams/deciliter  ondansetron Injectable 4 milliGRAM(s) IV Push every 6 hours PRN Nausea      Allergies    amoxicillin (Rash)    Intolerances        Vital Signs Last 24 Hrs  T(C): 36.9 (10 Nov 2018 05:14), Max: 36.9 (10 Nov 2018 05:14)  T(F): 98.4 (10 Nov 2018 05:14), Max: 98.4 (10 Nov 2018 05:14)  HR: 80 (10 Nov 2018 05:14) (76 - 90)  BP: 110/59 (10 Nov 2018 05:14) (110/59 - 133/68)  BP(mean): --  RR: 16 (10 Nov 2018 05:14) (14 - 18)  SpO2: 98% (10 Nov 2018 05:14) (94% - 99%)  Daily     Daily Weight in k.3 (10 Nov 2018 04:53)    PHYSICAL EXAM:  GENERAL: No distress, comfortable  HEAD:  Atraumatic, Normocephalic  EYES: Conjunctiva and sclera clear  ENMT: Moist mucous membranes, Good dentition, No lesions  NECK: Supple, No JVD  NERVOUS SYSTEM:  Alert & Oriented X3, Good concentration; Motor Strength wnl upper and lower extremities  CHEST/LUNG: Clear to percussion bilaterally; No rales, rhonchi, wheezing, or rubs  HEART: Regular rate and rhythm; No murmurs, rubs, or gallops  ABDOMEN: Soft, Nontender, Nondistended; Bowel sounds present  EXTREMITIES:  No edema B/L  LYMPH: No lymphadenopathy noted  SKIN: No rashes or lesions, pale    LABS:  Reviewed

## 2018-11-12 ENCOUNTER — TRANSCRIPTION ENCOUNTER (OUTPATIENT)
Age: 68
End: 2018-11-12

## 2018-11-12 LAB
ALBUMIN SERPL ELPH-MCNC: 3.6 G/DL — SIGNIFICANT CHANGE UP (ref 3.3–5.2)
ALP SERPL-CCNC: 68 U/L — SIGNIFICANT CHANGE UP (ref 40–120)
ALT FLD-CCNC: 7 U/L — SIGNIFICANT CHANGE UP
ANION GAP SERPL CALC-SCNC: 12 MMOL/L — SIGNIFICANT CHANGE UP (ref 5–17)
AST SERPL-CCNC: 27 U/L — SIGNIFICANT CHANGE UP
BILIRUB SERPL-MCNC: 0.4 MG/DL — SIGNIFICANT CHANGE UP (ref 0.4–2)
BUN SERPL-MCNC: 20 MG/DL — SIGNIFICANT CHANGE UP (ref 8–20)
CALCIUM SERPL-MCNC: 8.9 MG/DL — SIGNIFICANT CHANGE UP (ref 8.6–10.2)
CHLORIDE SERPL-SCNC: 105 MMOL/L — SIGNIFICANT CHANGE UP (ref 98–107)
CO2 SERPL-SCNC: 21 MMOL/L — LOW (ref 22–29)
CREAT SERPL-MCNC: 2.72 MG/DL — HIGH (ref 0.5–1.3)
GLUCOSE BLDC GLUCOMTR-MCNC: 148 MG/DL — HIGH (ref 70–99)
GLUCOSE BLDC GLUCOMTR-MCNC: 150 MG/DL — HIGH (ref 70–99)
GLUCOSE BLDC GLUCOMTR-MCNC: 158 MG/DL — HIGH (ref 70–99)
GLUCOSE BLDC GLUCOMTR-MCNC: 212 MG/DL — HIGH (ref 70–99)
GLUCOSE SERPL-MCNC: 148 MG/DL — HIGH (ref 70–115)
HCT VFR BLD CALC: 17.3 % — CRITICAL LOW (ref 42–52)
HGB BLD-MCNC: 5.2 G/DL — CRITICAL LOW (ref 14–18)
MCHC RBC-ENTMCNC: 20.2 PG — LOW (ref 27–31)
MCHC RBC-ENTMCNC: 30.1 G/DL — LOW (ref 32–36)
MCV RBC AUTO: 67.1 FL — LOW (ref 80–94)
PLATELET # BLD AUTO: 234 K/UL — SIGNIFICANT CHANGE UP (ref 150–400)
POTASSIUM SERPL-MCNC: 5.3 MMOL/L — SIGNIFICANT CHANGE UP (ref 3.5–5.3)
POTASSIUM SERPL-SCNC: 5.3 MMOL/L — SIGNIFICANT CHANGE UP (ref 3.5–5.3)
PROT SERPL-MCNC: 6.3 G/DL — LOW (ref 6.6–8.7)
RBC # BLD: 2.58 M/UL — LOW (ref 4.6–6.2)
RBC # FLD: 19.5 % — HIGH (ref 11–15.6)
SODIUM SERPL-SCNC: 138 MMOL/L — SIGNIFICANT CHANGE UP (ref 135–145)
WBC # BLD: 6.8 K/UL — SIGNIFICANT CHANGE UP (ref 4.8–10.8)
WBC # FLD AUTO: 6.8 K/UL — SIGNIFICANT CHANGE UP (ref 4.8–10.8)

## 2018-11-12 PROCEDURE — 99232 SBSQ HOSP IP/OBS MODERATE 35: CPT

## 2018-11-12 RX ORDER — SODIUM BICARBONATE 1 MEQ/ML
1 SYRINGE (ML) INTRAVENOUS
Qty: 0 | Refills: 0 | DISCHARGE
Start: 2018-11-12

## 2018-11-12 RX ORDER — SACCHAROMYCES BOULARDII 250 MG
1 POWDER IN PACKET (EA) ORAL
Qty: 0 | Refills: 0 | COMMUNITY
Start: 2018-11-12

## 2018-11-12 RX ORDER — DOCUSATE SODIUM 100 MG
1 CAPSULE ORAL
Qty: 0 | Refills: 0 | DISCHARGE
Start: 2018-11-12

## 2018-11-12 RX ORDER — PREGABALIN 225 MG/1
1 CAPSULE ORAL
Qty: 0 | Refills: 0 | COMMUNITY
Start: 2018-11-12

## 2018-11-12 RX ORDER — ASCORBIC ACID 60 MG
1 TABLET,CHEWABLE ORAL
Qty: 0 | Refills: 0 | COMMUNITY
Start: 2018-11-12

## 2018-11-12 RX ORDER — FOLIC ACID 0.8 MG
1 TABLET ORAL
Qty: 0 | Refills: 0 | DISCHARGE
Start: 2018-11-12

## 2018-11-12 RX ORDER — PANTOPRAZOLE SODIUM 20 MG/1
1 TABLET, DELAYED RELEASE ORAL
Qty: 0 | Refills: 0 | DISCHARGE
Start: 2018-11-12

## 2018-11-12 RX ORDER — FERROUS SULFATE 325(65) MG
1 TABLET ORAL
Qty: 90 | Refills: 0
Start: 2018-11-12 | End: 2018-12-11

## 2018-11-12 RX ORDER — TAMSULOSIN HYDROCHLORIDE 0.4 MG/1
2 CAPSULE ORAL
Qty: 0 | Refills: 0 | DISCHARGE
Start: 2018-11-12

## 2018-11-12 RX ORDER — POLYETHYLENE GLYCOL 3350 17 G/17G
17 POWDER, FOR SOLUTION ORAL ONCE
Qty: 0 | Refills: 0 | Status: COMPLETED | OUTPATIENT
Start: 2018-11-12 | End: 2018-11-12

## 2018-11-12 RX ORDER — METOPROLOL TARTRATE 50 MG
1 TABLET ORAL
Qty: 0 | Refills: 0 | COMMUNITY
Start: 2018-11-12

## 2018-11-12 RX ORDER — SODIUM BICARBONATE 1 MEQ/ML
650 SYRINGE (ML) INTRAVENOUS
Qty: 0 | Refills: 0 | Status: DISCONTINUED | OUTPATIENT
Start: 2018-11-12 | End: 2018-11-13

## 2018-11-12 RX ADMIN — Medication 1 TABLET(S): at 05:02

## 2018-11-12 RX ADMIN — Medication 25 MILLIGRAM(S): at 17:28

## 2018-11-12 RX ADMIN — ERYTHROPOIETIN 40000 UNIT(S): 10000 INJECTION, SOLUTION INTRAVENOUS; SUBCUTANEOUS at 01:42

## 2018-11-12 RX ADMIN — Medication 500 MILLIGRAM(S): at 12:11

## 2018-11-12 RX ADMIN — Medication 650 MILLIGRAM(S): at 17:27

## 2018-11-12 RX ADMIN — POLYETHYLENE GLYCOL 3350 17 GRAM(S): 17 POWDER, FOR SOLUTION ORAL at 05:45

## 2018-11-12 RX ADMIN — PANTOPRAZOLE SODIUM 40 MILLIGRAM(S): 20 TABLET, DELAYED RELEASE ORAL at 17:26

## 2018-11-12 RX ADMIN — SODIUM CHLORIDE 3 MILLILITER(S): 9 INJECTION INTRAMUSCULAR; INTRAVENOUS; SUBCUTANEOUS at 12:15

## 2018-11-12 RX ADMIN — Medication 100 MILLIGRAM(S): at 12:13

## 2018-11-12 RX ADMIN — Medication 1 TABLET(S): at 17:27

## 2018-11-12 RX ADMIN — Medication 4: at 12:41

## 2018-11-12 RX ADMIN — PREGABALIN 1000 MICROGRAM(S): 225 CAPSULE ORAL at 17:35

## 2018-11-12 RX ADMIN — PANTOPRAZOLE SODIUM 40 MILLIGRAM(S): 20 TABLET, DELAYED RELEASE ORAL at 05:03

## 2018-11-12 RX ADMIN — Medication 1 TABLET(S): at 12:11

## 2018-11-12 RX ADMIN — Medication 250 MILLIGRAM(S): at 05:02

## 2018-11-12 RX ADMIN — Medication 250 MILLIGRAM(S): at 17:27

## 2018-11-12 RX ADMIN — SODIUM CHLORIDE 3 MILLILITER(S): 9 INJECTION INTRAMUSCULAR; INTRAVENOUS; SUBCUTANEOUS at 05:02

## 2018-11-12 RX ADMIN — Medication 25 MILLIGRAM(S): at 05:03

## 2018-11-12 RX ADMIN — TAMSULOSIN HYDROCHLORIDE 0.8 MILLIGRAM(S): 0.4 CAPSULE ORAL at 21:41

## 2018-11-12 RX ADMIN — Medication 1 MILLIGRAM(S): at 12:11

## 2018-11-12 RX ADMIN — Medication 2: at 21:41

## 2018-11-12 RX ADMIN — IRON SUCROSE 110 MILLIGRAM(S): 20 INJECTION, SOLUTION INTRAVENOUS at 12:10

## 2018-11-12 RX ADMIN — SODIUM CHLORIDE 3 MILLILITER(S): 9 INJECTION INTRAMUSCULAR; INTRAVENOUS; SUBCUTANEOUS at 21:27

## 2018-11-12 NOTE — PROGRESS NOTE ADULT - PROBLEM SELECTOR PLAN 3
Encourage patient to participate in activities  Encourage conversation, explain step by step all procedures (be considerate - patient is blind both eyes)  Keep a safe well lit environment.

## 2018-11-12 NOTE — DISCHARGE NOTE ADULT - OTHER SIGNIFICANT FINDINGS
11-12    138  |  105  |  20.0  ----------------------------<  148<H>  5.3   |  21.0<L>  |  2.72<H>    Ca    8.9      12 Nov 2018 06:01    TPro  6.3<L>  /  Alb  3.6  /  TBili  0.4  /  DBili  x   /  AST  27  /  ALT  7   /  AlkPhos  68  11-12                          5.2    6.8   )-----------( 234      ( 12 Nov 2018 06:01 )             17.3     LIVER FUNCTIONS - ( 12 Nov 2018 06:01 )  Alb: 3.6 g/dL / Pro: 6.3 g/dL / ALK PHOS: 68 U/L / ALT: 7 U/L / AST: 27 U/L / GGT: x           Iron with Total Binding Capacity in AM (11.09.18 @ 09:39)    Iron - Total Binding Capacity.: 409 ug/dL    % Saturation, Iron: 25 %    Iron Total, Serum: 103 ug/dL    Ferritin, Serum (11.09.18 @ 09:39)    Ferritin, Serum: 10 ng/mL    Thyroid Stimulating Hormone, Serum: 2.24 uIU/mL (11.08.18 @ 22:18)    Troponin T, Serum: 0.10: Reference Interval for Troponin T  Less than 0.06 ng/mL - includes the 99th percentile of a healthy  population at a method C.V. of 10% or less.  NOTE: Troponin T is measured by the Roche CLIA method and these  results are not interchangable with Troponin I results since they are  different assays with different reference intervals. ng/mL (11.10.18 @ 13:45)    < from: TTE Echo Complete w/o Doppler (11.10.18 @ 14:20) >    Summary:   1. Technically difficult study.   2. Endocardial visualization was enhanced with intravenous echo contrast.   3. Normal global left ventricular systolic function.   4. Left ventricular ejection fraction, by visual estimation, is 50 to   55%.   5. Normal left ventricular internal cavity size.   6. Spectral Doppler shows impaired relaxation pattern of left   ventricular myocardial filling (Grade I diastolic dysfunction).   7. There is mild concentric left ventricular hypertrophy.   8. There is no significant pericardial fat pad present.   9. Thickening of the anterior and posterior mitral valve leaflets.  10. Trace tricuspid regurgitation.  11. There is no evidence of pericardial effusion.    < end of copied text >

## 2018-11-12 NOTE — DISCHARGE NOTE ADULT - PLAN OF CARE
control Take iron pills 1 tab by mouth 3x daily. Absorption is improved if you also take Vitamin C 500mg daily. If you take medications for acid reflux, be sure to take them at least an hour apart from your iron pills as they can lower the absorption of the iron. Iron pills may cause constipation, you can use an over the counter stool softener or laxative like senna or colace to help with this. Be sure to follow up with the nephrologist to continue getting shots called procrit that may also help stimulate your body to improve your blood count resolved You were noted to have chest pain either on arrival or during your hospitalization, porbably due to your low blood count. Tests to evaluate your heart including blood tests called troponins and EKGs were performed and fortunately you do NOT have signs of heart attack based on these studies. If you have however been instructed to follow up with a Cardiologist for further work up or continued management, it is extremely important that you do so in order to lower your risk of having a heart attack in the future. If you have been prescribed medications for heart health, it is very important that you ALWAYS take them and do not stop doing so unless instructed by a healthcare provider. follow up You were noticed to have blood work with some abnormalities. At this time, you are safe to leave the hospital, we do not suspect that anything immediately will come of these findings, but it is something that should be followed to see if it is getting better, staying the same, or getting worse. Be sure to discuss this at your follow up visit with your Primary Care Doctor to have these tests repeated and to see what work up, if any, is necessary to continue managing it as it is likely due to your low blood count Your kidney function is not optimal and is worsening due to your low blood count. This can be due to a variety of reasons. If you were not aware of this, you should discuss with your Primary Care Doctor about seeing a Nephrologist. If you already have a nephrologist, be sure to continue following up as scheduled. a1c < 8, yours is 7.3 Follow a low carb low sugar diet. Continue to take all antidiabetic medications/insulin as prescribed. Follow up with your Primary Care Doctor regularly for blood sugar/A1c checks. Be sure to see an eye doctor and foot doctor on an annual basis. You were noted to have a problem with urinating effectively prior to or during your hospitalization. For this reason, it is advised that you help yourself to urinate by using a bourne catheter. Some people are able to use the catheter as needed to urinate on a set schedule while others benefit from or prefer to have the catheter remain in to continuously urinate without issue. Either way, if you have a catheter you should be sure to follow up regularly with a Urologist. You should also be sure to keep your catheter clean to avoid infections as possible. We encourage you to treat your diabetes as your cataracts are likely a complication to it.

## 2018-11-12 NOTE — PROGRESS NOTE ADULT - SUBJECTIVE AND OBJECTIVE BOX
This is a 68 year old male PMHx chronic anemia, CKD, DM, admitted for blood work noted to be hgb 5.0. Stool occult blood positive. This is a Palliative care Consult.     HPI:  67 y/o male with history of DM-2 not on meds, legally blind due to B/L Cataracts for which he has declined surgery, Urinary retention with chronic bourne and chronic suppressive abx therapy with last bourne change on 1/26, chronic anemia with baseline Hbg of 6-7 based on o/p records, CKD-3 Patient saw his PMD yesterday for his "routine" f/u appt. Denies any other new symptoms. He was sent for blood work and was noted to have a hbg of 5.0 Patient unable to tell if he is having any dark BM or BRBPR as he is legally blind. He got a call from his PMD notifying him of this result and was told to call EMS to go to the nearest ER. As per EMS sheets patient got anxious and started have chest pressure when they arrived. He received aspirin 325 x 1 and SL nitro spray with little relief. His symptoms resolved by the time he got to the ED. EKG in the field did not show STEMI, and 2 subsequent ones in ED where negative for acute changes. His Trop. is 07 and his Hbg is noted ot be 4.9. Stool was brown but occult positive. His vitals are stable. Currently he has no complaints. Patient adamantly declining PRBC transfusion. Willing to proceed with bloodless medicine protocol. (09 Nov 2018 02:31)<end of copied text>    CC:chest pain, symptomatic anemia    Present Symptoms:   Dyspnea: 0   Nausea/Vomiting: No  Anxiety:  No  Depression: No  Fatigue: No  Loss of appetite: No  Constipation: No    Pain: No pain            Character-            Duration-            Effect-            Factors-            Frequency-            Location-            Severity-    Review of Systems: Reviewed                     Negative: Denies shortness of breath  All others negative    MEDICATIONS  (STANDING):  ascorbic acid 500 milliGRAM(s) Oral daily  cyanocobalamin 1000 MICROGram(s) Oral daily  dextrose 5%. 1000 milliLiter(s) (50 mL/Hr) IV Continuous <Continuous>  dextrose 50% Injectable 12.5 Gram(s) IV Push once  docusate sodium 100 milliGRAM(s) Oral daily  epoetin daniel Injectable 07704 Unit(s) SubCutaneous <User Schedule>  folic acid 1 milliGRAM(s) Oral daily  insulin lispro (HumaLOG) corrective regimen sliding scale   SubCutaneous Before meals and at bedtime  iron sucrose IVPB 200 milliGRAM(s) IV Intermittent every 24 hours  metoprolol tartrate 25 milliGRAM(s) Oral two times a day  multivitamin 1 Tablet(s) Oral daily  pantoprazole    Tablet 40 milliGRAM(s) Oral two times a day  saccharomyces boulardii 250 milliGRAM(s) Oral two times a day  sodium bicarbonate 650 milliGRAM(s) Oral two times a day  sodium chloride 0.9% lock flush 3 milliLiter(s) IV Push every 8 hours  tamsulosin 0.8 milliGRAM(s) Oral at bedtime  trimethoprim  160 mG/sulfamethoxazole 800 mG 1 Tablet(s) Oral two times a day    MEDICATIONS  (PRN):  dextrose 40% Gel 15 Gram(s) Oral once PRN Blood Glucose LESS THAN 70 milliGRAM(s)/deciliter  glucagon  Injectable 1 milliGRAM(s) IntraMuscular once PRN Glucose LESS THAN 70 milligrams/deciliter  ondansetron Injectable 4 milliGRAM(s) IV Push every 6 hours PRN Nausea      PHYSICAL EXAM:    Vital Signs Last 24 Hrs  T(C): 36.7 (12 Nov 2018 04:59), Max: 37.7 (11 Nov 2018 11:09)  T(F): 98 (12 Nov 2018 04:59), Max: 99.9 (11 Nov 2018 11:09)  HR: 82 (12 Nov 2018 04:59) (75 - 83)  BP: 149/66 (12 Nov 2018 04:59) (117/68 - 149/66)  BP(mean): --  RR: 18 (12 Nov 2018 04:59) (16 - 20)  SpO2: 97% (12 Nov 2018 04:59) (97% - 98%)    General: alert  oriented x _4__ legally blind    HEENT: normal      Lungs: comfortable     CV: normal      GI: normal      : normal    MSK: edema + 2 BLE, weakness, unsteady ambulating as per RN    Skin: dry  LABS:                          5.2    6.8   )-----------( 234      ( 12 Nov 2018 06:01 )             17.3     11-12    138  |  105  |  20.0  ----------------------------<  148<H>  5.3   |  21.0<L>  |  2.72<H>    Ca    8.9      12 Nov 2018 06:01    TPro  6.3<L>  /  Alb  3.6  /  TBili  0.4  /  DBili  x   /  AST  27  /  ALT  7   /  AlkPhos  68  11-12        I&O's Summary    11 Nov 2018 07:01  -  12 Nov 2018 07:00  --------------------------------------------------------  IN: 720 mL / OUT: 2375 mL / NET: -1655 mL        ADVANCE DIRECTIVES:   FULL CODE, discussed during consult

## 2018-11-12 NOTE — PROGRESS NOTE ADULT - ASSESSMENT
CKD Stage 4 (Fluctuates between 3b and 4 based on CKD-EPI calculation for GFR)  Severe Anemia  GIB  DM2  Metabolic acidosis    -Renal indices show a slow rise in creatinine likely due to the persistent severely anemic state; Mild acidosis. Potassium is the upper limit of normal  -Agree with Procrit and IV Iron  -The patient refuses blood transfusions despite counseling  -GI and heme follow up noted   -Started on Sodium bicarb tabs 650mg BID  -Low k diet  -No need for kayexalate, unless k is > 5.5    No renal objection to d/c. Will need close outpatient follow up    Thank you

## 2018-11-12 NOTE — PROGRESS NOTE ADULT - SUBJECTIVE AND OBJECTIVE BOX
NEPHROLOGY INTERVAL HPI/OVERNIGHT EVENTS:  HPI:  67 y/o male with history of DM-2 not on meds, legally blind due to B/L Cataracts for which he has declined surgery, Urinary retention with chronic bourne and chronic suppressive abx therapy with last bourne change on 1/26, chronic anemia with baseline Hbg of 6-7 based on o/p records, CKD-3 Patient saw his PMD yesterday for his "routine" f/u appt. Denies any other new symptoms. He was sent for blood work and was noted to have a hbg of 5.0 Patient unable to tell if he is having any dark BM or BRBPR as he is legally blind. He got a call from his PMD notifying him of this result and was told to call EMS to go to the nearest ER. As per EMS sheets patient got anxious and started have chest pressure when they arrived. He received aspirin 325 x 1 and SL nitro spray with little relief. His symptoms resolved by the time he got to the ED. EKG in the field did not show STEMI, and 2 subsequent ones in ED where negative for acute changes. His Trop. is 07 and his Hbg is noted ot be 4.9. Stool was brown but occult positive. His vitals are stable. Currently he has no complaints. Patient adamantly declining PRBC transfusion. Willing to proceed with bloodless medicine protocol. (09 Nov 2018 02:31)    Follow up CKD Stage 4/Anemia  No complaints    PAST MEDICAL & SURGICAL HISTORY:  Urinary retention  Other secondary cataract of both eyes  CKD (chronic kidney disease) stage 3, GFR 30-59 ml/min  Chronic anemia  Diabetes  No significant past surgical history      MEDICATIONS  (STANDING):  ascorbic acid 500 milliGRAM(s) Oral daily  cyanocobalamin 1000 MICROGram(s) Oral daily  dextrose 5%. 1000 milliLiter(s) (50 mL/Hr) IV Continuous <Continuous>  dextrose 50% Injectable 12.5 Gram(s) IV Push once  docusate sodium 100 milliGRAM(s) Oral daily  epoetin daniel Injectable 25178 Unit(s) SubCutaneous <User Schedule>  folic acid 1 milliGRAM(s) Oral daily  insulin lispro (HumaLOG) corrective regimen sliding scale   SubCutaneous Before meals and at bedtime  iron sucrose IVPB 200 milliGRAM(s) IV Intermittent every 24 hours  metoprolol tartrate 25 milliGRAM(s) Oral two times a day  multivitamin 1 Tablet(s) Oral daily  pantoprazole    Tablet 40 milliGRAM(s) Oral two times a day  saccharomyces boulardii 250 milliGRAM(s) Oral two times a day  sodium bicarbonate 650 milliGRAM(s) Oral two times a day  sodium chloride 0.9% lock flush 3 milliLiter(s) IV Push every 8 hours  tamsulosin 0.8 milliGRAM(s) Oral at bedtime  trimethoprim  160 mG/sulfamethoxazole 800 mG 1 Tablet(s) Oral two times a day    MEDICATIONS  (PRN):  dextrose 40% Gel 15 Gram(s) Oral once PRN Blood Glucose LESS THAN 70 milliGRAM(s)/deciliter  glucagon  Injectable 1 milliGRAM(s) IntraMuscular once PRN Glucose LESS THAN 70 milligrams/deciliter  ondansetron Injectable 4 milliGRAM(s) IV Push every 6 hours PRN Nausea      Allergies    amoxicillin (Rash)    Intolerances        Vital Signs Last 24 Hrs  T(C): 36.7 (12 Nov 2018 04:59), Max: 37.7 (11 Nov 2018 11:09)  T(F): 98 (12 Nov 2018 04:59), Max: 99.9 (11 Nov 2018 11:09)  HR: 82 (12 Nov 2018 04:59) (75 - 83)  BP: 149/66 (12 Nov 2018 04:59) (117/68 - 149/66)  BP(mean): --  RR: 18 (12 Nov 2018 04:59) (16 - 20)  SpO2: 97% (12 Nov 2018 04:59) (97% - 98%)  Daily     Daily     PHYSICAL EXAM:  GENERAL: No distress, comfortable  HEAD:  Atraumatic, Normocephalic  EYES: Conjunctiva and sclera clear  ENMT: Moist mucous membranes, Good dentition, No lesions  NECK: Supple, No JVD  NERVOUS SYSTEM:  Alert & Oriented X3, Good concentration; Motor Strength wnl upper and lower extremities  CHEST/LUNG: Clear to percussion bilaterally; No rales, rhonchi, wheezing, or rubs  HEART: Regular rate and rhythm; No murmurs, rubs, or gallops  ABDOMEN: Soft, Nontender, Nondistended; Bowel sounds present  EXTREMITIES:  No edema B/L  LYMPH: No lymphadenopathy noted  SKIN: No rashes or lesions, pale        LABS:                        4.7    6.6   )-----------( 253      ( 11 Nov 2018 05:50 )             15.6     11-12    138  |  105  |  20.0  ----------------------------<  148<H>  5.3   |  21.0<L>  |  2.72<H>    Ca    8.9      12 Nov 2018 06:01    TPro  6.3<L>  /  Alb  3.6  /  TBili  0.4  /  DBili  x   /  AST  27  /  ALT  7   /  AlkPhos  68  11-12              RADIOLOGY & ADDITIONAL TESTS:

## 2018-11-12 NOTE — PHYSICAL THERAPY INITIAL EVALUATION ADULT - MODIFIED CLINICAL TEST OF SENSORY INTEGRATION IN BALANCE TEST
Poor is atnding and ambulating with SAC: Posterior sways without compensation: significant fall risk

## 2018-11-12 NOTE — DISCHARGE NOTE ADULT - CARE PROVIDERS DIRECT ADDRESSES
,DirectAddress_Unknown,DirectAddress_Unknown,DirectAddress_Unknown,[email or web address]

## 2018-11-12 NOTE — PHYSICAL THERAPY INITIAL EVALUATION ADULT - ADDITIONAL COMMENTS
Pt states he was able to walk around the house prior to admission with SAC.   Pt lives in a house with 2 steps to enter with 2 rails and 0 stairs inside  Pt owns medical equipment: [employer]  Pt lives with: Daughter

## 2018-11-12 NOTE — DISCHARGE NOTE ADULT - PROVIDER TOKENS
FREE:[LAST:[Whitney Jones],PHONE:[(   )    -],FAX:[(   )    -],ADDRESS:[Dr. Whitney Jones MD  Family practice physician in [place], [place]  [facility] [place] [address]  Phone: [phone]]],TOKEN:'[id number]',TOKEN:'[id number]',TOKEN:'[id number]'

## 2018-11-12 NOTE — DISCHARGE NOTE ADULT - HOSPITAL COURSE
57 y/o male with PMH Dm2, legal blindness sec to cataracts, chronic anemia, chronic urinary retention presenting with with acute on chronic anemia in setting of occult pos stool + elevated troponin     Iron deficiency anemia due to chronic blood loss.    stable, currently asymptomatic  acute on chronic condition  baseline Hbg of 6-7 with old indicis showing microcytic/hypochromic likely from chronic blood loss vs underlying thalassemia.   discussed @ length with patient, pt understands risks (including sudden death) vs benefits of blood product transfusion but is refusing due to personal preference, is NOT Zoroastrianism, pt with full understanding of situation, can recall and verbalize, has capacity to refuse   sp procrit, IV iron, FA, MVI, Vit. C, ppi inpatient  possible Upper GI source of chronic blood loss, Gi eval appreciated, refusing inpatient scope, considering it outpt but per GI must have hgb 7 prior to intervention, unclear he will reach this goal with supportive care alone  will need close heme onc + nephro fu to cont dosing procrit + venofer outpatient  ferrous sulfate TID with vit c on dc  PT eval appreciated, rec for SILVINA but patient adamantly refused, again pt with capacity to make this decision, lives at home with good family support      Chest pain.   resolved  assoc with elevated trops from demand ischemia in setting of severe anemia cont low dose BB started this admission to reduce hypovolemic induced tachycardia   no asa in setting of anemia and occult pos stool.   TTE reviewed, unremarkable  cardiology cs appreciated, outpt fu    Demand Ischemia  sec to severe anemia  as above      CKD 3/4  worsening  likely secondary to poor intravascular circulation in setting of profound anemia  Nephrology cs appreciated, needs close outpt fu, will likely need procrit as well on regular basis      Type 2 diabetes mellitus with diabetic nephropathy, without long-term current use of insulin.    non compliant with checking BS or taking meds outpatient  HISS/Accuchecks while in hospital.   last a1c < 8 in the 7s so patient despite non compliance within goal range      Urinary retention.   chronic, stable  has chronic bourne to be continued per urology, reinserted on 11/9 for retention of 600ml  cont prophylactic Bactrim per urology   continue flomax, outpt urology follow up    Cataracts BL Eyes  complicated by blindness  likely sec to poorly controlled DM, patient not interested in cataract surgery. Cont. fall risk/ambulate with assistance.    All electrolyte abnormalities were monitored carefully and repleted as necessary during this hospitalization. At the time of discharge patient was hemodynamically stable and amenable to all terms of discharge. The patient has received verbal instructions from myself regarding discharge plans.     Length of Discharge: 45MIN 59 y/o male with PMH Dm2, legal blindness sec to cataracts, chronic anemia, chronic urinary retention presenting with with acute on chronic anemia in setting of occult pos stool + elevated troponin     Iron deficiency anemia due to chronic blood loss.    stable, currently asymptomatic  acute on chronic condition  baseline Hbg of 6-7 with old indicis showing microcytic/hypochromic likely from chronic blood loss vs underlying thalassemia.   discussed @ length with patient, pt understands risks (including sudden death) vs benefits of blood product transfusion but is refusing due to personal preference, is NOT Anglican, pt with full understanding of situation, can recall and verbalize, has capacity to refuse   sp procrit, IV iron, FA, MVI, Vit. C, ppi inpatient  possible Upper GI source of chronic blood loss, Gi eval appreciated, refusing inpatient scope, considering it outpt but per GI must have hgb 7 prior to intervention, unclear he will reach this goal with supportive care alone  will need close heme onc + nephro fu to cont dosing procrit + venofer outpatient  ferrous sulfate TID with vit c on dc  PT eval appreciated, rec for SILVINA but patient adamantly refused, again pt with capacity to make this decision, lives at home with good family support      Chest pain.   resolved  assoc with elevated trops from demand ischemia in setting of severe anemia cont low dose BB started this admission to reduce hypovolemic induced tachycardia   no asa in setting of anemia and occult pos stool.   TTE reviewed, unremarkable  cardiology cs appreciated, outpt fu    Demand Ischemia  sec to severe anemia  as above      CKD 3/4  worsening  likely secondary to poor intravascular circulation in setting of profound anemia  Nephrology cs appreciated, needs close outpt fu, will likely need procrit as well on regular basis      Type 2 diabetes mellitus with diabetic nephropathy, without long-term current use of insulin.    non compliant with checking BS or taking meds outpatient  HISS/Accuchecks while in hospital.   last a1c < 8 in the 7s so patient despite non compliance within goal range      Urinary retention.   chronic, stable  has chronic bourne to be continued per urology, reinserted on 11/9 for retention of 600ml  cont prophylactic Bactrim per urology   continue flomax, outpt urology follow up    Cataracts BL Eyes  complicated by blindness  likely sec to poorly controlled DM, patient not interested in cataract surgery. Cont. fall risk/ambulate with assistance.    Vital Signs Last 24 Hrs  T(C): 37.3 (12 Nov 2018 15:33), Max: 37.3 (12 Nov 2018 15:33)  T(F): 99.2 (12 Nov 2018 15:33), Max: 99.2 (12 Nov 2018 15:33)  HR: 82 (12 Nov 2018 15:33) (78 - 89)  BP: 141/65 (12 Nov 2018 15:33) (112/65 - 149/66)  BP(mean): --  RR: 18 (12 Nov 2018 15:33) (18 - 18)  SpO2: 99% (12 Nov 2018 11:00) (97% - 99%)    PHYSICAL EXAM.    GEN - appears age appropriate. well nourished. pleasant. no distress.   HEENT - NCAT, BL cataracts  RESP - CTA BL, no wheeze/stridor/rhonchi/crackles. not on supplemental O2. able to speak in full sentences without distress.   CARDIO - NS1S2, RRR. No murmurs/rubs/gallops.  ABD - Soft/Non tender/Non distended. Normal BS x4 quadrants. no guarding/rebound tenderness.  Ext - No JASE.  MSK - BL 5/5 strength on upper and lower extremities.   Neuro - AAOx3. cn 2-12 grossly intact  Psych - normal affect  Skin - c/d/i. no rashes/lesions    All electrolyte abnormalities were monitored carefully and repleted as necessary during this hospitalization. At the time of discharge patient was hemodynamically stable and amenable to all terms of discharge. The patient has received verbal instructions from myself regarding discharge plans.     Length of Discharge: 45MIN

## 2018-11-12 NOTE — PROGRESS NOTE ADULT - PROBLEM SELECTOR PLAN 1
- Monitor CBC   - Hematology and GI consulted  - Pt refused egd/colonoscopy here but willing to do it outpatient as per Gi   - Continue with procrit and iron as ordered

## 2018-11-12 NOTE — DISCHARGE NOTE ADULT - PATIENT PORTAL LINK FT
You can access the [facility] Patient Portal, offered by [facility], by registering with the following website: [email or web address]

## 2018-11-12 NOTE — DISCHARGE NOTE ADULT - CARE PROVIDER_API CALL
Whitney Jones,   Dr. Whitney Jones MD  Family practice physician in [place], [place]  Address: [address] [place] [address]  Phone: [phone]  Phone: (   )    -  Fax: (   )    -    Sher Vogel), Cardiovascular Disease; Internal Medicine  [address] [place] [address]  Phone: [phone]  Fax: [phone]    Efe Carr), Nephrology  [address]  [address]  [place] [address]  Phone: [phone]  Fax: [phone]    Jaswant Fonseca), Urology  [address]  [address]  [place] [address]  Phone: [phone]  Fax: [phone]

## 2018-11-12 NOTE — DISCHARGE NOTE ADULT - MEDICATION SUMMARY - MEDICATIONS TO TAKE
I will START or STAY ON the medications listed below when I get home from the hospital:    sodium bicarbonate 650 mg oral tablet  -- 1 tab(s) by mouth 2 times a day  -- Indication: For renal function    tamsulosin 0.4 mg oral capsule  -- 2 cap(s) by mouth once a day (at bedtime)  -- Indication: For Urinary retention    metoprolol tartrate 25 mg oral tablet  -- 1 tab(s) by mouth 2 times a day  -- Indication: For Tachycardia    ferrous sulfate 325 mg (65 mg elemental iron) oral tablet  -- 1 tab(s) by mouth 3 times a day   -- Check with your doctor before becoming pregnant.  Do not chew, break, or crush.  May discolor urine or feces.    -- Indication: For Anemia    docusate sodium 100 mg oral capsule  -- 1 cap(s) by mouth 2 times a day  -- Indication: For Constipation    saccharomyces boulardii lyo 250 mg oral capsule  -- 1 cap(s) by mouth 2 times a day  -- Indication: For while on antibiotics    pantoprazole 40 mg oral delayed release tablet  -- 1 tab(s) by mouth 2 times a day  -- Indication: For reflux    Bactrim  -- 1 tab(s) by mouth 2 times a day  -- Indication: For Prevention of urinary infection    Multiple Vitamins oral tablet  -- 1 tab(s) by mouth once a day  -- Indication: For vitamin    ascorbic acid 500 mg oral tablet  -- 1 tab(s) by mouth once a day  -- Indication: For vitamin    cyanocobalamin 1000 mcg oral tablet  -- 1 tab(s) by mouth once a day  -- Indication: For vitamin    folic acid 1 mg oral tablet  -- 1 tab(s) by mouth once a day  -- Indication: For vitamin

## 2018-11-12 NOTE — PROGRESS NOTE ADULT - SUBJECTIVE AND OBJECTIVE BOX
Chief Complaint: chart and hx reviewed.    HPI: 69 yo male with multiple signif and chronic problems admitted with severe anemia and some chest discomfort-mild troponin elevation. PMH-+ for anemia-had refused prior transfusion. Pt and wife deny they are Jehova's witnesses. Hx of chronic bourne and renal impairment and aodm. Has had a prior cardiac w/u elsewhere-pt says he had a good cardiac cath-but there's no documentation that he did. No hx of MI. Allergy to "all" antibiotics.    PAST MEDICAL & SURGICAL HISTORY:  Urinary retention  Other secondary cataract of both eyes  CKD (chronic kidney disease) stage 3, GFR 30-59 ml/min  Chronic anemia  Diabetes  No significant past surgical history      PREVIOUS DIAGNOSTIC TESTING:      ECHO  FINDINGS: EF 50+% w/o valve disease. Grade I diastolic dysfunction.    STRESS  FINDINGS:    CATHETERIZATION  FINDINGS:    MEDICATIONS  (STANDING):  ascorbic acid 500 milliGRAM(s) Oral daily  cyanocobalamin 1000 MICROGram(s) Oral daily  dextrose 5%. 1000 milliLiter(s) (50 mL/Hr) IV Continuous <Continuous>  dextrose 50% Injectable 12.5 Gram(s) IV Push once  docusate sodium 100 milliGRAM(s) Oral daily  epoetin daniel Injectable 13472 Unit(s) SubCutaneous <User Schedule>  folic acid 1 milliGRAM(s) Oral daily  insulin lispro (HumaLOG) corrective regimen sliding scale   SubCutaneous Before meals and at bedtime  iron sucrose IVPB 200 milliGRAM(s) IV Intermittent every 24 hours  metoprolol tartrate 25 milliGRAM(s) Oral two times a day  multivitamin 1 Tablet(s) Oral daily  pantoprazole    Tablet 40 milliGRAM(s) Oral two times a day  saccharomyces boulardii 250 milliGRAM(s) Oral two times a day  sodium bicarbonate 650 milliGRAM(s) Oral two times a day  sodium chloride 0.9% lock flush 3 milliLiter(s) IV Push every 8 hours  tamsulosin 0.8 milliGRAM(s) Oral at bedtime  trimethoprim  160 mG/sulfamethoxazole 800 mG 1 Tablet(s) Oral two times a day    MEDICATIONS  (PRN):  dextrose 40% Gel 15 Gram(s) Oral once PRN Blood Glucose LESS THAN 70 milliGRAM(s)/deciliter  glucagon  Injectable 1 milliGRAM(s) IntraMuscular once PRN Glucose LESS THAN 70 milligrams/deciliter  ondansetron Injectable 4 milliGRAM(s) IV Push every 6 hours PRN Nausea      FAMILY HISTORY:  Family history of diabetes mellitus      ROS: Negative other than as mentioned in HPI.    Vital Signs Last 24 Hrs  T(C): 36.7 (12 Nov 2018 04:59), Max: 37.7 (11 Nov 2018 11:09)  T(F): 98 (12 Nov 2018 04:59), Max: 99.9 (11 Nov 2018 11:09)  HR: 75 reg (12 Nov 2018 04:59) (75 - 83)  BP: 120/7- RA manually (12 Nov 2018 04:59) (117/68 - 149/66)  BP(mean): --  RR: 184 non-labored.(12 Nov 2018 04:59) (16 - 20)  SpO2: 97% (12 Nov 2018 04:59) (97% - 98%)    PHYSICAL EXAM:  General: Appears well developed, well nourished alert and cooperative. Middle aged afebrile M nad.  HEENT: Head; normocephalic, atraumatic.  Eyes;   Pupils reactive, cornea wnl.  Neck; Supple, no nodes adenopathy, no NVD or carotid bruit or thyromegaly.  CARDIOVASCULAR; No murmur, rub, gallop or lift. Normal S1 and S2.  LUNGS; No rales, rhonchi or wheeze. Normal breath sounds bilaterally.  ABDOMEN ; Soft, nontender without mass or organomegaly. bowel sounds normoactive.  EXTREMITIES; No clubbing, cyanosis or edema.  ROM normal. Bourne cath present.  SKIN; warm and dry with normal turgor.  NEURO; Alert/oriented x 3/normal motor exam.    PSYCH; normal affect.            INTERPRETATION OF TELEMETRY:    ECG: SR lahb  cxr wnl    I&O's Detail    11 Nov 2018 07:01  -  12 Nov 2018 07:00  --------------------------------------------------------  IN:    Oral Fluid: 720 mL  Total IN: 720 mL    OUT:    Indwelling Catheter - Urethral: 2375 mL  Total OUT: 2375 mL    Total NET: -1655 mL          LABS:                        5.2    6.8   )-----------( 234      ( 12 Nov 2018 06:01 )             17.3     11-12    138  |  105  |  20.0  ----------------------------<  148<H>  5.3   |  21.0<L>  |  2.72<H>    Ca    8.9      12 Nov 2018 06:01    TPro  6.3<L>  /  Alb  3.6  /  TBili  0.4  /  DBili  x   /  AST  27  /  ALT  7   /  AlkPhos  68  11-12    CARDIAC MARKERS ( 10 Nov 2018 13:45 )  x     / 0.10 ng/mL / x     / x     / x              I&O's Summary    11 Nov 2018 07:01  -  12 Nov 2018 07:00  --------------------------------------------------------  IN: 720 mL / OUT: 2375 mL / NET: -1655 mL        RADIOLOGY & ADDITIONAL STUDIES:

## 2018-11-12 NOTE — PROGRESS NOTE ADULT - PROBLEM SELECTOR PLAN 4
Met with patient at bedside who was out of bed to chair during encounter  - Patient is AO x 4, legally blind (see palliative consult for details)   - No c/o discomforts including chest pain, sob, n/v/d/constipation, fever, chills, or palpitations  - Plan for possible discharge to home vs [facility]  - Advance directives discussed, patient will remain FULL code  - Daughter is very supportive in care - patient has a large support system with his children   - Will continue to support and monitor    Total time spent 20 minutes    Thank you for the opportunity to assist with the care of this patient.   [facility] [phone].

## 2018-11-12 NOTE — PROGRESS NOTE ADULT - ASSESSMENT
1. Marked microcytic hypochromic anemia-source unclear but chronic blood loss likely.-Pt denies being a Jehova's witness but declines transfusion.  2. CRI and hx of obstructive uropathy.  3. trivial troponin elevations-not consistent w an ischemic pattern and likely secondary to his renal impairment.  4. aodm.

## 2018-11-12 NOTE — DISCHARGE NOTE ADULT - SECONDARY DIAGNOSIS.
Chest pain, unspecified type Demand ischemia CKD (chronic kidney disease) stage 4, GFR 15-29 ml/min Type 2 diabetes mellitus with diabetic nephropathy, without long-term current use of insulin Urinary retention Other secondary cataract of both eyes

## 2018-11-12 NOTE — DISCHARGE NOTE ADULT - CARE PLAN
Principal Discharge DX:	Iron deficiency anemia due to chronic blood loss  Goal:	control  Assessment and plan of treatment:	Take iron pills 1 tab by mouth 3x daily. Absorption is improved if you also take Vitamin C 500mg daily. If you take medications for acid reflux, be sure to take them at least an hour apart from your iron pills as they can lower the absorption of the iron. Iron pills may cause constipation, you can use an over the counter stool softener or laxative like senna or colace to help with this. Be sure to follow up with the nephrologist to continue getting shots called procrit that may also help stimulate your body to improve your blood count  Secondary Diagnosis:	Chest pain, unspecified type  Goal:	resolved  Assessment and plan of treatment:	You were noted to have chest pain either on arrival or during your hospitalization, porbably due to your low blood count. Tests to evaluate your heart including blood tests called troponins and EKGs were performed and fortunately you do NOT have signs of heart attack based on these studies. If you have however been instructed to follow up with a Cardiologist for further work up or continued management, it is extremely important that you do so in order to lower your risk of having a heart attack in the future. If you have been prescribed medications for heart health, it is very important that you ALWAYS take them and do not stop doing so unless instructed by a healthcare provider.  Secondary Diagnosis:	Demand ischemia  Goal:	follow up  Assessment and plan of treatment:	You were noticed to have blood work with some abnormalities. At this time, you are safe to leave the hospital, we do not suspect that anything immediately will come of these findings, but it is something that should be followed to see if it is getting better, staying the same, or getting worse. Be sure to discuss this at your follow up visit with your Primary Care Doctor to have these tests repeated and to see what work up, if any, is necessary to continue managing it as it is likely due to your low blood count  Secondary Diagnosis:	CKD (chronic kidney disease) stage 4, GFR 15-29 ml/min  Goal:	follow up  Assessment and plan of treatment:	Your kidney function is not optimal and is worsening due to your low blood count. This can be due to a variety of reasons. If you were not aware of this, you should discuss with your Primary Care Doctor about seeing a Nephrologist. If you already have a nephrologist, be sure to continue following up as scheduled.  Secondary Diagnosis:	Type 2 diabetes mellitus with diabetic nephropathy, without long-term current use of insulin  Goal:	a1c < 8, yours is 7.3  Assessment and plan of treatment:	Follow a low carb low sugar diet. Continue to take all antidiabetic medications/insulin as prescribed. Follow up with your Primary Care Doctor regularly for blood sugar/A1c checks. Be sure to see an eye doctor and foot doctor on an annual basis.  Secondary Diagnosis:	Urinary retention  Goal:	follow up  Assessment and plan of treatment:	You were noted to have a problem with urinating effectively prior to or during your hospitalization. For this reason, it is advised that you help yourself to urinate by using a bourne catheter. Some people are able to use the catheter as needed to urinate on a set schedule while others benefit from or prefer to have the catheter remain in to continuously urinate without issue. Either way, if you have a catheter you should be sure to follow up regularly with a Urologist. You should also be sure to keep your catheter clean to avoid infections as possible.  Secondary Diagnosis:	Other secondary cataract of both eyes  Goal:	follow up  Assessment and plan of treatment:	We encourage you to treat your diabetes as your cataracts are likely a complication to it.

## 2018-11-13 VITALS — WEIGHT: 128.75 LBS

## 2018-11-13 PROCEDURE — 99239 HOSP IP/OBS DSCHRG MGMT >30: CPT

## 2018-11-27 PROCEDURE — 71046 X-RAY EXAM CHEST 2 VIEWS: CPT

## 2018-11-27 PROCEDURE — 85027 COMPLETE CBC AUTOMATED: CPT

## 2018-11-27 PROCEDURE — 81003 URINALYSIS AUTO W/O SCOPE: CPT

## 2018-11-27 PROCEDURE — 83540 ASSAY OF IRON: CPT

## 2018-11-27 PROCEDURE — 82728 ASSAY OF FERRITIN: CPT

## 2018-11-27 PROCEDURE — 36415 COLL VENOUS BLD VENIPUNCTURE: CPT

## 2018-11-27 PROCEDURE — 93005 ELECTROCARDIOGRAM TRACING: CPT

## 2018-11-27 PROCEDURE — 96374 THER/PROPH/DIAG INJ IV PUSH: CPT

## 2018-11-27 PROCEDURE — 93307 TTE W/O DOPPLER COMPLETE: CPT

## 2018-11-27 PROCEDURE — 84484 ASSAY OF TROPONIN QUANT: CPT

## 2018-11-27 PROCEDURE — 86850 RBC ANTIBODY SCREEN: CPT

## 2018-11-27 PROCEDURE — 86901 BLOOD TYPING SEROLOGIC RH(D): CPT

## 2018-11-27 PROCEDURE — 84466 ASSAY OF TRANSFERRIN: CPT

## 2018-11-27 PROCEDURE — 83550 IRON BINDING TEST: CPT

## 2018-11-27 PROCEDURE — 99285 EMERGENCY DEPT VISIT HI MDM: CPT | Mod: 25

## 2018-11-27 PROCEDURE — 80053 COMPREHEN METABOLIC PANEL: CPT

## 2018-11-27 PROCEDURE — 86900 BLOOD TYPING SEROLOGIC ABO: CPT

## 2018-11-27 PROCEDURE — 82962 GLUCOSE BLOOD TEST: CPT

## 2018-11-27 PROCEDURE — 97163 PT EVAL HIGH COMPLEX 45 MIN: CPT

## 2018-12-24 ENCOUNTER — INPATIENT (INPATIENT)
Facility: HOSPITAL | Age: 68
LOS: 2 days | Discharge: HOME | DRG: 684 | End: 2018-12-27
Attending: HOSPITALIST | Admitting: HOSPITALIST
Payer: MEDICARE

## 2018-12-24 VITALS
TEMPERATURE: 99 F | SYSTOLIC BLOOD PRESSURE: 160 MMHG | OXYGEN SATURATION: 98 % | HEIGHT: 70 IN | DIASTOLIC BLOOD PRESSURE: 81 MMHG | WEIGHT: 240.08 LBS | HEART RATE: 90 BPM | RESPIRATION RATE: 18 BRPM

## 2018-12-24 DIAGNOSIS — N18.9 CHRONIC KIDNEY DISEASE, UNSPECIFIED: ICD-10-CM

## 2018-12-24 PROBLEM — N18.3 CHRONIC KIDNEY DISEASE, STAGE 3 (MODERATE): Chronic | Status: ACTIVE | Noted: 2018-11-09

## 2018-12-24 LAB
ALBUMIN SERPL ELPH-MCNC: 3.5 G/DL — SIGNIFICANT CHANGE UP (ref 3.3–5.2)
ALP SERPL-CCNC: 66 U/L — SIGNIFICANT CHANGE UP (ref 40–120)
ALT FLD-CCNC: 10 U/L — SIGNIFICANT CHANGE UP
ANION GAP SERPL CALC-SCNC: 12 MMOL/L — SIGNIFICANT CHANGE UP (ref 5–17)
ANISOCYTOSIS BLD QL: SIGNIFICANT CHANGE UP
APPEARANCE UR: ABNORMAL
APTT BLD: 27.6 SEC — SIGNIFICANT CHANGE UP (ref 27.5–36.3)
AST SERPL-CCNC: 27 U/L — SIGNIFICANT CHANGE UP
BACTERIA # UR AUTO: ABNORMAL
BASOPHILS # BLD AUTO: 0 K/UL — SIGNIFICANT CHANGE UP (ref 0–0.2)
BASOPHILS NFR BLD AUTO: 0.4 % — SIGNIFICANT CHANGE UP (ref 0–2)
BILIRUB SERPL-MCNC: 0.4 MG/DL — SIGNIFICANT CHANGE UP (ref 0.4–2)
BILIRUB UR-MCNC: NEGATIVE — SIGNIFICANT CHANGE UP
BLD GP AB SCN SERPL QL: SIGNIFICANT CHANGE UP
BUN SERPL-MCNC: 30 MG/DL — HIGH (ref 8–20)
CALCIUM SERPL-MCNC: 8.4 MG/DL — LOW (ref 8.6–10.2)
CHLORIDE SERPL-SCNC: 104 MMOL/L — SIGNIFICANT CHANGE UP (ref 98–107)
CO2 SERPL-SCNC: 20 MMOL/L — LOW (ref 22–29)
COLOR SPEC: YELLOW — SIGNIFICANT CHANGE UP
CREAT SERPL-MCNC: 1.61 MG/DL — HIGH (ref 0.5–1.3)
DACRYOCYTES BLD QL SMEAR: SLIGHT — SIGNIFICANT CHANGE UP
DIFF PNL FLD: ABNORMAL
ELLIPTOCYTES BLD QL SMEAR: SLIGHT — SIGNIFICANT CHANGE UP
EOSINOPHIL # BLD AUTO: 0 K/UL — SIGNIFICANT CHANGE UP (ref 0–0.5)
EOSINOPHIL NFR BLD AUTO: 0.6 % — SIGNIFICANT CHANGE UP (ref 0–6)
EPI CELLS # UR: SIGNIFICANT CHANGE UP
GLUCOSE SERPL-MCNC: 181 MG/DL — HIGH (ref 70–115)
GLUCOSE UR QL: NEGATIVE MG/DL — SIGNIFICANT CHANGE UP
HCT VFR BLD CALC: 16.1 % — CRITICAL LOW (ref 42–52)
HCT VFR BLD CALC: 16.5 % — CRITICAL LOW (ref 42–52)
HGB BLD-MCNC: 4.5 G/DL — CRITICAL LOW (ref 14–18)
HGB BLD-MCNC: 4.8 G/DL — CRITICAL LOW (ref 14–18)
HYPOCHROMIA BLD QL: SIGNIFICANT CHANGE UP
INR BLD: 1.26 RATIO — HIGH (ref 0.88–1.16)
KETONES UR-MCNC: NEGATIVE — SIGNIFICANT CHANGE UP
LACTATE BLDV-MCNC: 1.1 MMOL/L — SIGNIFICANT CHANGE UP (ref 0.5–2)
LEUKOCYTE ESTERASE UR-ACNC: ABNORMAL
LIDOCAIN IGE QN: 36 U/L — SIGNIFICANT CHANGE UP (ref 22–51)
LYMPHOCYTES # BLD AUTO: 1.1 K/UL — SIGNIFICANT CHANGE UP (ref 1–4.8)
LYMPHOCYTES # BLD AUTO: 16.2 % — LOW (ref 20–55)
MACROCYTES BLD QL: SLIGHT — SIGNIFICANT CHANGE UP
MCHC RBC-ENTMCNC: 17.5 PG — LOW (ref 27–31)
MCHC RBC-ENTMCNC: 17.8 PG — LOW (ref 27–31)
MCHC RBC-ENTMCNC: 28.6 G/DL — LOW (ref 32–36)
MCHC RBC-ENTMCNC: 29.1 G/DL — LOW (ref 32–36)
MCV RBC AUTO: 61.2 FL — LOW (ref 80–94)
MCV RBC AUTO: 61.3 FL — LOW (ref 80–94)
MICROCYTES BLD QL: SIGNIFICANT CHANGE UP
MONOCYTES # BLD AUTO: 0.8 K/UL — SIGNIFICANT CHANGE UP (ref 0–0.8)
MONOCYTES NFR BLD AUTO: 12.3 % — HIGH (ref 3–10)
NEUTROPHILS # BLD AUTO: 4.9 K/UL — SIGNIFICANT CHANGE UP (ref 1.8–8)
NEUTROPHILS NFR BLD AUTO: 70.4 % — SIGNIFICANT CHANGE UP (ref 37–73)
NITRITE UR-MCNC: NEGATIVE — SIGNIFICANT CHANGE UP
NT-PROBNP SERPL-SCNC: 382 PG/ML — HIGH (ref 0–300)
OB PNL STL: NEGATIVE — SIGNIFICANT CHANGE UP
OVALOCYTES BLD QL SMEAR: SLIGHT — SIGNIFICANT CHANGE UP
PH UR: 6.5 — SIGNIFICANT CHANGE UP (ref 5–8)
PLAT MORPH BLD: NORMAL — SIGNIFICANT CHANGE UP
PLATELET # BLD AUTO: 292 K/UL — SIGNIFICANT CHANGE UP (ref 150–400)
PLATELET # BLD AUTO: 314 K/UL — SIGNIFICANT CHANGE UP (ref 150–400)
POIKILOCYTOSIS BLD QL AUTO: SLIGHT — SIGNIFICANT CHANGE UP
POLYCHROMASIA BLD QL SMEAR: SLIGHT — SIGNIFICANT CHANGE UP
POTASSIUM SERPL-MCNC: 5.3 MMOL/L — SIGNIFICANT CHANGE UP (ref 3.5–5.3)
POTASSIUM SERPL-SCNC: 5.3 MMOL/L — SIGNIFICANT CHANGE UP (ref 3.5–5.3)
PROT SERPL-MCNC: 6.3 G/DL — LOW (ref 6.6–8.7)
PROT UR-MCNC: 30 MG/DL
PROTHROM AB SERPL-ACNC: 14.6 SEC — HIGH (ref 10–12.9)
RBC # BLD: 2.63 M/UL — LOW (ref 4.6–6.2)
RBC # BLD: 2.69 M/UL — LOW (ref 4.6–6.2)
RBC # FLD: 19.5 % — HIGH (ref 11–15.6)
RBC # FLD: 19.7 % — HIGH (ref 11–15.6)
RBC BLD AUTO: ABNORMAL
RBC CASTS # UR COMP ASSIST: ABNORMAL /HPF (ref 0–4)
SCHISTOCYTES BLD QL AUTO: SLIGHT — SIGNIFICANT CHANGE UP
SODIUM SERPL-SCNC: 136 MMOL/L — SIGNIFICANT CHANGE UP (ref 135–145)
SP GR SPEC: 1.01 — SIGNIFICANT CHANGE UP (ref 1.01–1.02)
STOMATOCYTES BLD QL SMEAR: PRESENT — SIGNIFICANT CHANGE UP
T4 AB SER-ACNC: 6.1 UG/DL — SIGNIFICANT CHANGE UP (ref 4.5–12)
TARGETS BLD QL SMEAR: SLIGHT — SIGNIFICANT CHANGE UP
TROPONIN T SERPL-MCNC: 0.06 NG/ML — SIGNIFICANT CHANGE UP (ref 0–0.06)
TSH SERPL-MCNC: 2.01 UIU/ML — SIGNIFICANT CHANGE UP (ref 0.27–4.2)
TYPE + AB SCN PNL BLD: SIGNIFICANT CHANGE UP
UROBILINOGEN FLD QL: NEGATIVE MG/DL — SIGNIFICANT CHANGE UP
WBC # BLD: 6.4 K/UL — SIGNIFICANT CHANGE UP (ref 4.8–10.8)
WBC # BLD: 6.9 K/UL — SIGNIFICANT CHANGE UP (ref 4.8–10.8)
WBC # FLD AUTO: 6.4 K/UL — SIGNIFICANT CHANGE UP (ref 4.8–10.8)
WBC # FLD AUTO: 6.9 K/UL — SIGNIFICANT CHANGE UP (ref 4.8–10.8)
WBC UR QL: ABNORMAL

## 2018-12-24 PROCEDURE — 99223 1ST HOSP IP/OBS HIGH 75: CPT

## 2018-12-24 PROCEDURE — 71045 X-RAY EXAM CHEST 1 VIEW: CPT | Mod: 26

## 2018-12-24 PROCEDURE — 99285 EMERGENCY DEPT VISIT HI MDM: CPT

## 2018-12-24 PROCEDURE — 74176 CT ABD & PELVIS W/O CONTRAST: CPT | Mod: 26

## 2018-12-24 PROCEDURE — 93010 ELECTROCARDIOGRAM REPORT: CPT

## 2018-12-24 PROCEDURE — 71045 X-RAY EXAM CHEST 1 VIEW: CPT | Mod: 26,77

## 2018-12-24 RX ORDER — CEFTRIAXONE 500 MG/1
1 INJECTION, POWDER, FOR SOLUTION INTRAMUSCULAR; INTRAVENOUS ONCE
Qty: 0 | Refills: 0 | Status: COMPLETED | OUTPATIENT
Start: 2018-12-24 | End: 2018-12-25

## 2018-12-24 RX ORDER — ACETAMINOPHEN 500 MG
650 TABLET ORAL ONCE
Qty: 0 | Refills: 0 | Status: COMPLETED | OUTPATIENT
Start: 2018-12-24 | End: 2018-12-24

## 2018-12-24 RX ORDER — CEFTRIAXONE 500 MG/1
1 INJECTION, POWDER, FOR SOLUTION INTRAMUSCULAR; INTRAVENOUS EVERY 24 HOURS
Qty: 0 | Refills: 0 | Status: DISCONTINUED | OUTPATIENT
Start: 2018-12-24 | End: 2018-12-25

## 2018-12-24 RX ORDER — MINERAL OIL
133 OIL (ML) MISCELLANEOUS ONCE
Qty: 0 | Refills: 0 | Status: COMPLETED | OUTPATIENT
Start: 2018-12-24 | End: 2018-12-24

## 2018-12-24 RX ORDER — MORPHINE SULFATE 50 MG/1
4 CAPSULE, EXTENDED RELEASE ORAL ONCE
Qty: 0 | Refills: 0 | Status: DISCONTINUED | OUTPATIENT
Start: 2018-12-24 | End: 2018-12-24

## 2018-12-24 RX ADMIN — Medication 650 MILLIGRAM(S): at 18:48

## 2018-12-24 RX ADMIN — Medication 650 MILLIGRAM(S): at 19:50

## 2018-12-24 NOTE — ED ADULT NURSE REASSESSMENT NOTE - NS ED NURSE REASSESS COMMENT FT1
pt's bourne / leg bag changed to a new #16 American bourne . no resistance + return ua c+s sent. pt gino it well. bag placed to bedside for gravity drainage. will continue to eval pt.

## 2018-12-24 NOTE — H&P ADULT - HISTORY OF PRESENT ILLNESS
69 y/o male, with h/o diet controlled DM II, chronic Warren's catheter. legally blind male came to the ER because of generalized vague abdominal pain. patient did not have a bowel movement for 1 weekLabs showed Hgb: 4.8 gm/dl. patient and family  denies any blood in the urine or stools. Stools Guaiac test is negative in the ER. U/A: showed UTI. h/o Sickle cell trait. 69 y/o male, with h/o diet controlled DM II, chronic Warren's catheter. chronic anemia, legally blind male came to the ER because of generalized vague abdominal pain. patient did not have a bowel movement for 1 week Labs showed Hgb: 4.8 gm/dl. patient and family  denies any blood in the urine or stools. Stools Guaiac test is negative in the ER. U/A: showed UTI. h/o Sickle cell trait.

## 2018-12-24 NOTE — ED PROCEDURE NOTE - CPROC ED TOLERANCE1
Patient tolerated procedure well./Reversal agents were used./romazicon
Patient tolerated procedure well.

## 2018-12-24 NOTE — ED PROVIDER NOTE - OBJECTIVE STATEMENT
Pressure feeling in abdomen.  fullness, normal appetite  until today, decrease appetite.  (+) flatus.  No BM in 1 week.

## 2018-12-24 NOTE — ED PROVIDER NOTE - CARE PLAN
Principal Discharge DX:	Anemia due to chronic kidney disease, unspecified CKD stage  Secondary Diagnosis:	UTI (urinary tract infection)

## 2018-12-24 NOTE — ED PROVIDER NOTE - PROGRESS NOTE DETAILS
multiple unsuccessful attemps at peripheral access by several nurses and myself including US. good flash, no no flush, attempted radial access for lab draw  unsucessful.  ultimately able to obtain labs from right femoral art. continue to have difficulty gaining vascular access, ICU PA at bedside attempting.  Attempt by me, canulated right carotid. See procedure not.

## 2018-12-24 NOTE — ED ADULT NURSE REASSESSMENT NOTE - NS ED NURSE REASSESS COMMENT FT1
Blood transfusion in progress, well tolerated. Negative adverse effects. Cardiac monitor in place, NSR. Patient A&Ox4, denies any pain or discomfort. Respirations even & unlabored, denies any numbness or tingling. Central line to left side of neck infusing well. Warren cath in place, draining clear yellow urine. Will continue to monitor.

## 2018-12-24 NOTE — PROCEDURE NOTE - ADDITIONAL PROCEDURE DETAILS
called by ED to place line in patient with severe anemia in order to receive blood transfusions necessary to preserve end organ function.  ED attempt R IJ unsuccessful.

## 2018-12-24 NOTE — ED PROCEDURE NOTE - NS_POSTPROCCAREGUIDE_ED_ALL_ED
Patient is now fully awake, with vital signs and temperature stable, hydration is adequate, patients Bre’s  score is at baseline (or greater than 8), patient and escort has received  discharge education.

## 2018-12-24 NOTE — PROCEDURE NOTE - PROCEDURE
<<-----Click on this checkbox to enter Procedure Central line placement  12/24/2018    Active  [id number]

## 2018-12-24 NOTE — ED ADULT NURSE REASSESSMENT NOTE - NS ED NURSE REASSESS COMMENT FT1
Dr. Mayers at bedside able to draw blood from femoral stick after unsuccessful attempts with sono for peripheral IV access. pt gino it well, now awaiting ct scan completed contrast at 5pm

## 2018-12-24 NOTE — ED ADULT NURSE REASSESSMENT NOTE - NS ED NURSE REASSESS COMMENT FT1
lab called a second time about low H/H, aware he needs a t+c updated lab that pt is currently getting a central line placed due to no access and very difficult stick, will get blood once line is placed.  also at this time pt is refusing blood transfusion. wants other options,

## 2018-12-24 NOTE — ED PROVIDER NOTE - MEDICAL DECISION MAKING DETAILS
check labs, change bourne, bladder wall thickening c/w uti possible cause for his abd discomfort considering CT negative. ,  will give abx, pt initially reluctant for transfusion, pt agreeable. will transfuse. admit for w/u of anemia.  appears chronic but severe.

## 2018-12-24 NOTE — ED ADULT NURSE REASSESSMENT NOTE - NS ED NURSE REASSESS COMMENT FT1
Report received from off going RN, charting as noted. Patient A&Ox4, denies any pain or discomfort. Denies any chest pain, shortness of breath, nausea or dizziness. Respirations even & unlabored, denies any numbness or tingling. Patient visually & hearing impaired. No IV access at this time, MD aware & to place central line. Will monitor.

## 2018-12-24 NOTE — ED PROCEDURE NOTE - PROCEDURE ADDITIONAL DETAILS
using crutches
device removed, arterial placement, direct pressure held 5 minutes. no significant hematoma, No external bleeding. sterile dressing applied.

## 2018-12-24 NOTE — ED ADULT NURSE NOTE - NSIMPLEMENTINTERV_GEN_ALL_ED
Implemented All Fall Risk Interventions:  [place] to call system. Call bell, personal items and telephone within reach. Instruct patient to call for assistance. Room bathroom lighting operational. Non-slip footwear when patient is off stretcher. Physically safe environment: no spills, clutter or unnecessary equipment. Stretcher in lowest position, wheels locked, appropriate side rails in place. Provide visual cue, wrist band, yellow gown, etc. Monitor gait and stability. Monitor for mental status changes and reorient to person, place, and time. Review medications for side effects contributing to fall risk. Reinforce activity limits and safety measures with patient and family.

## 2018-12-24 NOTE — ED ADULT NURSE NOTE - ED STAT RN HANDOFF DETAILS
Chart physically handed to receiving RN, physically handed receiving RN in-progress transfusion paperwork.

## 2018-12-24 NOTE — ED PROCEDURE NOTE - CPROC ED INFUS LINE DETAIL1
Ultrasound guidance was used during placement./All lumen(s) aspirated and flushed without difficulty./The catheter was placed using sterile technique./The location was identified, and the area was draped and prepped./The guidewire was recovered.

## 2018-12-25 DIAGNOSIS — N39.0 URINARY TRACT INFECTION, SITE NOT SPECIFIED: ICD-10-CM

## 2018-12-25 DIAGNOSIS — E11.8 TYPE 2 DIABETES MELLITUS WITH UNSPECIFIED COMPLICATIONS: ICD-10-CM

## 2018-12-25 DIAGNOSIS — D64.9 ANEMIA, UNSPECIFIED: ICD-10-CM

## 2018-12-25 DIAGNOSIS — N18.3 CHRONIC KIDNEY DISEASE, STAGE 3 (MODERATE): ICD-10-CM

## 2018-12-25 LAB
BASOPHILS # BLD AUTO: 0 K/UL — SIGNIFICANT CHANGE UP (ref 0–0.2)
BASOPHILS NFR BLD AUTO: 0.2 % — SIGNIFICANT CHANGE UP (ref 0–2)
EOSINOPHIL # BLD AUTO: 0 K/UL — SIGNIFICANT CHANGE UP (ref 0–0.5)
EOSINOPHIL NFR BLD AUTO: 0.4 % — SIGNIFICANT CHANGE UP (ref 0–5)
GLUCOSE BLDC GLUCOMTR-MCNC: 149 MG/DL — HIGH (ref 70–99)
GLUCOSE BLDC GLUCOMTR-MCNC: 183 MG/DL — HIGH (ref 70–99)
GLUCOSE BLDC GLUCOMTR-MCNC: 189 MG/DL — HIGH (ref 70–99)
GLUCOSE BLDC GLUCOMTR-MCNC: 191 MG/DL — HIGH (ref 70–99)
HCT VFR BLD CALC: 23.9 % — LOW (ref 42–52)
HGB BLD-MCNC: 7.3 G/DL — LOW (ref 14–18)
LYMPHOCYTES # BLD AUTO: 0.5 K/UL — LOW (ref 1–4.8)
LYMPHOCYTES # BLD AUTO: 5.9 % — LOW (ref 20–55)
MCHC RBC-ENTMCNC: 20.2 PG — LOW (ref 27–31)
MCHC RBC-ENTMCNC: 30.5 G/DL — LOW (ref 32–36)
MCV RBC AUTO: 66.2 FL — LOW (ref 80–94)
MONOCYTES # BLD AUTO: 0.9 K/UL — HIGH (ref 0–0.8)
MONOCYTES NFR BLD AUTO: 10.4 % — HIGH (ref 3–10)
NEUTROPHILS # BLD AUTO: 7.5 K/UL — SIGNIFICANT CHANGE UP (ref 1.8–8)
NEUTROPHILS NFR BLD AUTO: 83 % — HIGH (ref 37–73)
PLATELET # BLD AUTO: 276 K/UL — SIGNIFICANT CHANGE UP (ref 150–400)
RBC # BLD: 3.61 M/UL — LOW (ref 4.6–6.2)
RBC # FLD: 23.7 % — HIGH (ref 11–15.6)
WBC # BLD: 9 K/UL — SIGNIFICANT CHANGE UP (ref 4.8–10.8)
WBC # FLD AUTO: 9 K/UL — SIGNIFICANT CHANGE UP (ref 4.8–10.8)

## 2018-12-25 PROCEDURE — 99232 SBSQ HOSP IP/OBS MODERATE 35: CPT

## 2018-12-25 RX ORDER — DEXTROSE 50 % IN WATER 50 %
25 SYRINGE (ML) INTRAVENOUS ONCE
Qty: 0 | Refills: 0 | Status: DISCONTINUED | OUTPATIENT
Start: 2018-12-25 | End: 2018-12-27

## 2018-12-25 RX ORDER — DOCUSATE SODIUM 100 MG
100 CAPSULE ORAL
Qty: 0 | Refills: 0 | Status: DISCONTINUED | OUTPATIENT
Start: 2018-12-25 | End: 2018-12-27

## 2018-12-25 RX ORDER — DEXTROSE 50 % IN WATER 50 %
12.5 SYRINGE (ML) INTRAVENOUS ONCE
Qty: 0 | Refills: 0 | Status: DISCONTINUED | OUTPATIENT
Start: 2018-12-25 | End: 2018-12-27

## 2018-12-25 RX ORDER — GLUCAGON INJECTION, SOLUTION 0.5 MG/.1ML
1 INJECTION, SOLUTION SUBCUTANEOUS ONCE
Qty: 0 | Refills: 0 | Status: DISCONTINUED | OUTPATIENT
Start: 2018-12-25 | End: 2018-12-27

## 2018-12-25 RX ORDER — METOPROLOL TARTRATE 50 MG
25 TABLET ORAL
Qty: 0 | Refills: 0 | Status: DISCONTINUED | OUTPATIENT
Start: 2018-12-25 | End: 2018-12-27

## 2018-12-25 RX ORDER — FOLIC ACID 0.8 MG
1 TABLET ORAL DAILY
Qty: 0 | Refills: 0 | Status: DISCONTINUED | OUTPATIENT
Start: 2018-12-25 | End: 2018-12-27

## 2018-12-25 RX ORDER — DEXTROSE 50 % IN WATER 50 %
15 SYRINGE (ML) INTRAVENOUS ONCE
Qty: 0 | Refills: 0 | Status: DISCONTINUED | OUTPATIENT
Start: 2018-12-25 | End: 2018-12-27

## 2018-12-25 RX ORDER — INSULIN LISPRO 100/ML
VIAL (ML) SUBCUTANEOUS
Qty: 0 | Refills: 0 | Status: DISCONTINUED | OUTPATIENT
Start: 2018-12-25 | End: 2018-12-27

## 2018-12-25 RX ORDER — PANTOPRAZOLE SODIUM 20 MG/1
40 TABLET, DELAYED RELEASE ORAL
Qty: 0 | Refills: 0 | Status: DISCONTINUED | OUTPATIENT
Start: 2018-12-25 | End: 2018-12-27

## 2018-12-25 RX ORDER — FERROUS SULFATE 325(65) MG
325 TABLET ORAL
Qty: 0 | Refills: 0 | Status: DISCONTINUED | OUTPATIENT
Start: 2018-12-25 | End: 2018-12-27

## 2018-12-25 RX ORDER — MINERAL OIL
133 OIL (ML) MISCELLANEOUS ONCE
Qty: 0 | Refills: 0 | Status: COMPLETED | OUTPATIENT
Start: 2018-12-25 | End: 2018-12-25

## 2018-12-25 RX ORDER — SODIUM CHLORIDE 9 MG/ML
1000 INJECTION, SOLUTION INTRAVENOUS
Qty: 0 | Refills: 0 | Status: DISCONTINUED | OUTPATIENT
Start: 2018-12-25 | End: 2018-12-27

## 2018-12-25 RX ORDER — TAMSULOSIN HYDROCHLORIDE 0.4 MG/1
0.4 CAPSULE ORAL AT BEDTIME
Qty: 0 | Refills: 0 | Status: DISCONTINUED | OUTPATIENT
Start: 2018-12-25 | End: 2018-12-27

## 2018-12-25 RX ADMIN — Medication 325 MILLIGRAM(S): at 17:18

## 2018-12-25 RX ADMIN — Medication 25 MILLIGRAM(S): at 17:18

## 2018-12-25 RX ADMIN — Medication 25 MILLIGRAM(S): at 06:04

## 2018-12-25 RX ADMIN — TAMSULOSIN HYDROCHLORIDE 0.4 MILLIGRAM(S): 0.4 CAPSULE ORAL at 21:48

## 2018-12-25 RX ADMIN — Medication 1: at 12:00

## 2018-12-25 RX ADMIN — Medication 100 MILLIGRAM(S): at 06:04

## 2018-12-25 RX ADMIN — Medication 133 MILLILITER(S): at 06:03

## 2018-12-25 RX ADMIN — CEFTRIAXONE 100 GRAM(S): 500 INJECTION, POWDER, FOR SOLUTION INTRAMUSCULAR; INTRAVENOUS at 02:20

## 2018-12-25 RX ADMIN — Medication 325 MILLIGRAM(S): at 06:04

## 2018-12-25 RX ADMIN — Medication 100 MILLIGRAM(S): at 17:18

## 2018-12-25 RX ADMIN — CEFTRIAXONE 100 GRAM(S): 500 INJECTION, POWDER, FOR SOLUTION INTRAMUSCULAR; INTRAVENOUS at 06:03

## 2018-12-25 RX ADMIN — Medication 1 TABLET(S): at 11:54

## 2018-12-25 RX ADMIN — Medication 1 MILLIGRAM(S): at 11:54

## 2018-12-25 RX ADMIN — PANTOPRAZOLE SODIUM 40 MILLIGRAM(S): 20 TABLET, DELAYED RELEASE ORAL at 06:04

## 2018-12-25 NOTE — ED ADULT NURSE REASSESSMENT NOTE - NS ED NURSE REASSESS COMMENT FT1
Blood transfusion complete. Patient tolerated well, negative s/s adverse reactions. Patient A&Ox4, denies any pain or discomfort. Denies any chest pain, shortness of breath, nausea or dizziness. Respirations even & unlabored, denies any numbness or tingling. Cardiac monitor in place, NSR. Central line to left side neck patent. Will continue to monitor.

## 2018-12-25 NOTE — CONSULT NOTE ADULT - SUBJECTIVE AND OBJECTIVE BOX
Patient with chronic indwelling bourne.  Is colonized with bacteria in urine  No signs of sepsis or symptomatic UTI  DC rocephin    Continue bourne.  patient ot follow up with his own urologist

## 2018-12-26 LAB
-  AMIKACIN: SIGNIFICANT CHANGE UP
-  AMPICILLIN/SULBACTAM: SIGNIFICANT CHANGE UP
-  AMPICILLIN: SIGNIFICANT CHANGE UP
-  AZTREONAM: SIGNIFICANT CHANGE UP
-  CEFAZOLIN: SIGNIFICANT CHANGE UP
-  CEFEPIME: SIGNIFICANT CHANGE UP
-  CEFOXITIN: SIGNIFICANT CHANGE UP
-  CEFTRIAXONE: SIGNIFICANT CHANGE UP
-  CIPROFLOXACIN: SIGNIFICANT CHANGE UP
-  ERTAPENEM: SIGNIFICANT CHANGE UP
-  GENTAMICIN: SIGNIFICANT CHANGE UP
-  IMIPENEM: SIGNIFICANT CHANGE UP
-  LEVOFLOXACIN: SIGNIFICANT CHANGE UP
-  MEROPENEM: SIGNIFICANT CHANGE UP
-  NITROFURANTOIN: SIGNIFICANT CHANGE UP
-  PIPERACILLIN/TAZOBACTAM: SIGNIFICANT CHANGE UP
-  TIGECYCLINE: SIGNIFICANT CHANGE UP
-  TOBRAMYCIN: SIGNIFICANT CHANGE UP
-  TRIMETHOPRIM/SULFAMETHOXAZOLE: SIGNIFICANT CHANGE UP
ANION GAP SERPL CALC-SCNC: 11 MMOL/L — SIGNIFICANT CHANGE UP (ref 5–17)
BUN SERPL-MCNC: 26 MG/DL — HIGH (ref 8–20)
CALCIUM SERPL-MCNC: 8.4 MG/DL — LOW (ref 8.6–10.2)
CHLORIDE SERPL-SCNC: 107 MMOL/L — SIGNIFICANT CHANGE UP (ref 98–107)
CO2 SERPL-SCNC: 22 MMOL/L — SIGNIFICANT CHANGE UP (ref 22–29)
CREAT SERPL-MCNC: 1.77 MG/DL — HIGH (ref 0.5–1.3)
CULTURE RESULTS: SIGNIFICANT CHANGE UP
GLUCOSE BLDC GLUCOMTR-MCNC: 139 MG/DL — HIGH (ref 70–99)
GLUCOSE BLDC GLUCOMTR-MCNC: 143 MG/DL — HIGH (ref 70–99)
GLUCOSE BLDC GLUCOMTR-MCNC: 148 MG/DL — HIGH (ref 70–99)
GLUCOSE BLDC GLUCOMTR-MCNC: 163 MG/DL — HIGH (ref 70–99)
GLUCOSE SERPL-MCNC: 122 MG/DL — HIGH (ref 70–115)
HBA1C BLD-MCNC: 5.6 % — SIGNIFICANT CHANGE UP (ref 4–5.6)
HCT VFR BLD CALC: 22.8 % — LOW (ref 42–52)
HGB BLD-MCNC: 6.9 G/DL — CRITICAL LOW (ref 14–18)
MCHC RBC-ENTMCNC: 20.1 PG — LOW (ref 27–31)
MCHC RBC-ENTMCNC: 30.3 G/DL — LOW (ref 32–36)
MCV RBC AUTO: 66.5 FL — LOW (ref 80–94)
METHOD TYPE: SIGNIFICANT CHANGE UP
ORGANISM # SPEC MICROSCOPIC CNT: SIGNIFICANT CHANGE UP
ORGANISM # SPEC MICROSCOPIC CNT: SIGNIFICANT CHANGE UP
PLATELET # BLD AUTO: 260 K/UL — SIGNIFICANT CHANGE UP (ref 150–400)
POTASSIUM SERPL-MCNC: 4.8 MMOL/L — SIGNIFICANT CHANGE UP (ref 3.5–5.3)
POTASSIUM SERPL-SCNC: 4.8 MMOL/L — SIGNIFICANT CHANGE UP (ref 3.5–5.3)
RBC # BLD: 3.43 M/UL — LOW (ref 4.6–6.2)
RBC # FLD: 24.1 % — HIGH (ref 11–15.6)
SODIUM SERPL-SCNC: 140 MMOL/L — SIGNIFICANT CHANGE UP (ref 135–145)
SPECIMEN SOURCE: SIGNIFICANT CHANGE UP
WBC # BLD: 6.6 K/UL — SIGNIFICANT CHANGE UP (ref 4.8–10.8)
WBC # FLD AUTO: 6.6 K/UL — SIGNIFICANT CHANGE UP (ref 4.8–10.8)

## 2018-12-26 PROCEDURE — 99232 SBSQ HOSP IP/OBS MODERATE 35: CPT

## 2018-12-26 RX ADMIN — Medication 100 MILLIGRAM(S): at 05:17

## 2018-12-26 RX ADMIN — Medication 1 MILLIGRAM(S): at 12:07

## 2018-12-26 RX ADMIN — Medication 25 MILLIGRAM(S): at 05:17

## 2018-12-26 RX ADMIN — Medication 100 MILLIGRAM(S): at 17:12

## 2018-12-26 RX ADMIN — Medication 1 TABLET(S): at 12:07

## 2018-12-26 RX ADMIN — Medication 25 MILLIGRAM(S): at 17:12

## 2018-12-26 RX ADMIN — Medication 325 MILLIGRAM(S): at 05:17

## 2018-12-26 RX ADMIN — Medication 1: at 12:07

## 2018-12-26 RX ADMIN — TAMSULOSIN HYDROCHLORIDE 0.4 MILLIGRAM(S): 0.4 CAPSULE ORAL at 22:37

## 2018-12-26 RX ADMIN — PANTOPRAZOLE SODIUM 40 MILLIGRAM(S): 20 TABLET, DELAYED RELEASE ORAL at 05:17

## 2018-12-26 NOTE — PROGRESS NOTE ADULT - ASSESSMENT
67 y/o male with chronic anemia, UTI, DM II, chronic bourne's catheter, chronic renal insuff
67 y/o male with chronic anemia, UTI, DM II, chronic bourne's catheter, chronic renal insuff

## 2018-12-26 NOTE — PROGRESS NOTE ADULT - PROBLEM SELECTOR PLAN 1
Patient is Jeshahnaz's wittiness, he wanted to get blood transfusion this time, he was consented and got transfuse 2 units pRBC, his Hb came up from 4.5 to 7.3, Stools Guaiac: Negative. po ferrous sulfate and multivitamins, he was previously seen by oncology and he had the occult blood positive, told patient to repeat CBC in am if trending down, might need an other unit.
Patient is Juan's wittiness, he wanted to get blood transfusion this time, he was consented and got transfuse 2 units pRBC, his Hb came up from 4.5 to 7.3, now is 6.9, Stools Guaiac: Negative now, po ferrous sulfate and multivitamins, he was previously seen by oncology and he had the occult blood positive, told patient to repeat CBC in am if trending down, might need an other unit, so today Hb was noted to be 6.9, he is being given an other unit of PRBCs, will get CBC again in am if stable then plan D/C, will get PT to see him.

## 2018-12-26 NOTE — PROCEDURE NOTE - NSPOSTCAREGUIDE_GEN_A_CORE
Keep the cast/splint/dressing clean and dry/Instructed patient/caregiver to follow-up with primary care physician/Instructed patient/caregiver regarding signs and symptoms of infection/Verbal/written post procedure instructions were given to patient/caregiver Verbal/written post procedure instructions were given to patient/caregiver/Instructed patient/caregiver regarding signs and symptoms of infection/Care for catheter as per unit/ICU protocols/Keep the cast/splint/dressing clean and dry/Instructed patient/caregiver to follow-up with primary care physician

## 2018-12-26 NOTE — PROGRESS NOTE ADULT - PROBLEM SELECTOR PROBLEM 3
Urinary tract infection without hematuria, site unspecified
Urinary tract infection without hematuria, site unspecified

## 2018-12-26 NOTE — PROGRESS NOTE ADULT - SUBJECTIVE AND OBJECTIVE BOX
ANTOINETTE WILSON    [id number]    68y      Male    Patient is a 68y old  Male who presents with a chief complaint of anemia (25 Dec 2018 11:52)      INTERVAL HPI/OVERNIGHT EVENTS:    patient is feeling better, he has no more dizziness, has no nausea, vomiting, dizziness, fever, chills    REVIEW OF SYSTEMS:    CONSTITUTIONAL: No fever, some fatigue  RESPIRATORY: No cough, No shortness of breath  CARDIOVASCULAR: No chest pain, palpitations  GASTROINTESTINAL: No abdominal, No nausea, vomiting  NEUROLOGICAL: No headaches,  loss of strength.  MISCELLANEOUS: No joint swelling or pain       Vital Signs Last 24 Hrs  T(C): 36.8 (25 Dec 2018 16:22), Max: 37.2 (25 Dec 2018 12:16)  T(F): 98.3 (25 Dec 2018 16:22), Max: 99 (25 Dec 2018 12:16)  HR: 80 (25 Dec 2018 16:22) (69 - 88)  BP: 137/75 (25 Dec 2018 16:22) (130/73 - 159/82)  RR: 20 (25 Dec 2018 16:22) (14 - 20)  SpO2: 98% (25 Dec 2018 16:22) (96% - 100%)    PHYSICAL EXAM:    GENERAL: Elderly male looking comfortable  HEENT: Legally blind  NECK: soft, Supple, No JVD,   CHEST/LUNG: Decrease air entry bilaterally; No wheezing  HEART: S1S2+, Regular rate and rhythm; No murmurs  ABDOMEN: Soft, Nontender, Nondistended; Bowel sounds present  EXTREMITIES:  2+ Peripheral Pulses, No edema  SKIN: No rashes or lesions  NEURO: AAOX3, no focal deficits, no motor r sensory loss  PSYCH: normal mood      LABS:                        7.3    9.0   )-----------( 276      ( 25 Dec 2018 08:47 )             23.9     12-24    136  |  104  |  30.0<H>  ----------------------------<  181<H>  5.3   |  20.0<L>  |  1.61<H>    Ca    8.4<L>      24 Dec 2018 18:29    TPro  6.3<L>  /  Alb  3.5  /  TBili  0.4  /  DBili  x   /  AST  27  /  ALT  10  /  AlkPhos  66  12-24    PT/INR - ( 24 Dec 2018 23:27 )   PT: 14.6 sec;   INR: 1.26 ratio         PTT - ( 24 Dec 2018 23:27 )  PTT:27.6 sec  Urinalysis Basic - ( 24 Dec 2018 19:31 )    Color: Yellow / Appearance: Slightly Turbid / S.010 / pH: x  Gluc: x / Ketone: Negative  / Bili: Negative / Urobili: Negative mg/dL   Blood: x / Protein: 30 mg/dL / Nitrite: Negative   Leuk Esterase: Moderate / RBC: 6-10 /HPF / WBC 26-50   Sq Epi: x / Non Sq Epi: Occasional / Bacteria: Many          I&O's Summary    24 Dec 2018 07:01  -  25 Dec 2018 07:00  --------------------------------------------------------  IN: 540 mL / OUT: 1950 mL / NET: -1410 mL        MEDICATIONS  (STANDING):  dextrose 5%. 1000 milliLiter(s) (50 mL/Hr) IV Continuous <Continuous>  dextrose 50% Injectable 12.5 Gram(s) IV Push once  dextrose 50% Injectable 25 Gram(s) IV Push once  dextrose 50% Injectable 25 Gram(s) IV Push once  docusate sodium 100 milliGRAM(s) Oral two times a day  ferrous    sulfate 325 milliGRAM(s) Oral two times a day  folic acid 1 milliGRAM(s) Oral daily  insulin lispro (HumaLOG) corrective regimen sliding scale   SubCutaneous three times a day before meals  metoprolol tartrate 25 milliGRAM(s) Oral two times a day  multivitamin 1 Tablet(s) Oral daily  pantoprazole    Tablet 40 milliGRAM(s) Oral before breakfast  tamsulosin 0.4 milliGRAM(s) Oral at bedtime    MEDICATIONS  (PRN):  dextrose 40% Gel 15 Gram(s) Oral once PRN Blood Glucose LESS THAN 70 milliGRAM(s)/deciliter  glucagon  Injectable 1 milliGRAM(s) IntraMuscular once PRN Glucose LESS THAN 70 milligrams/deciliter
ANTOINETTE WILSON    [id number]    68y      Male    Patient is a 68y old  Male who presents with a chief complaint of anemia (25 Dec 2018 17:49)      INTERVAL HPI/OVERNIGHT EVENTS:    patient is feeling better, has no complains, he has no more dizziness, has no nausea, vomiting, dizziness, fever, chills    REVIEW OF SYSTEMS:    CONSTITUTIONAL: No fever, some fatigue  RESPIRATORY: No cough, No shortness of breath  CARDIOVASCULAR: No chest pain, palpitations  GASTROINTESTINAL: No abdominal, No nausea, vomiting  NEUROLOGICAL: No headaches,  loss of strength.  MISCELLANEOUS: No joint swelling or pain       Vital Signs Last 24 Hrs  T(C): 36.9 (26 Dec 2018 15:55), Max: 37 (26 Dec 2018 11:34)  T(F): 98.4 (26 Dec 2018 15:55), Max: 98.6 (26 Dec 2018 11:34)  HR: 80 (26 Dec 2018 15:55) (65 - 80)  BP: 142/62 (26 Dec 2018 15:55) (106/62 - 151/77)  RR: 16 (26 Dec 2018 15:55) (14 - 20)  SpO2: 98% (26 Dec 2018 15:55) (94% - 98%)    PHYSICAL EXAM:    GENERAL: Elderly male looking comfortable  HEENT: Legally blind  NECK: soft, Supple, No JVD,   CHEST/LUNG: Decrease air entry bilaterally; No wheezing  HEART: S1S2+, Regular rate and rhythm; No murmurs  ABDOMEN: Soft, Nontender, Nondistended; Bowel sounds present  EXTREMITIES:  2+ Peripheral Pulses, No edema  SKIN: No rashes or lesions  NEURO: AAOX3, no focal deficits, no motor r sensory loss  PSYCH: normal mood        LABS:                        6.9    6.6   )-----------( 260      ( 26 Dec 2018 09:15 )             22.8     12-26    140  |  107  |  26.0<H>  ----------------------------<  122<H>  4.8   |  22.0  |  1.77<H>    Ca    8.4<L>      26 Dec 2018 09:15    TPro  6.3<L>  /  Alb  3.5  /  TBili  0.4  /  DBili  x   /  AST  27  /  ALT  10  /  AlkPhos  66  12-24    PT/INR - ( 24 Dec 2018 23:27 )   PT: 14.6 sec;   INR: 1.26 ratio         PTT - ( 24 Dec 2018 23:27 )  PTT:27.6 sec  Urinalysis Basic - ( 24 Dec 2018 19:31 )    Color: Yellow / Appearance: Slightly Turbid / S.010 / pH: x  Gluc: x / Ketone: Negative  / Bili: Negative / Urobili: Negative mg/dL   Blood: x / Protein: 30 mg/dL / Nitrite: Negative   Leuk Esterase: Moderate / RBC: 6-10 /HPF / WBC 26-50   Sq Epi: x / Non Sq Epi: Occasional / Bacteria: Many          I&O's Summary    25 Dec 2018 07:  -  26 Dec 2018 07:00  --------------------------------------------------------  IN: 680 mL / OUT: 4400 mL / NET: -3720 mL    26 Dec 2018 07:  -  26 Dec 2018 17:03  --------------------------------------------------------  IN: 0 mL / OUT: 400 mL / NET: -400 mL        MEDICATIONS  (STANDING):  dextrose 5%. 1000 milliLiter(s) (50 mL/Hr) IV Continuous <Continuous>  dextrose 50% Injectable 12.5 Gram(s) IV Push once  dextrose 50% Injectable 25 Gram(s) IV Push once  dextrose 50% Injectable 25 Gram(s) IV Push once  docusate sodium 100 milliGRAM(s) Oral two times a day  ferrous    sulfate 325 milliGRAM(s) Oral two times a day  folic acid 1 milliGRAM(s) Oral daily  insulin lispro (HumaLOG) corrective regimen sliding scale   SubCutaneous three times a day before meals  metoprolol tartrate 25 milliGRAM(s) Oral two times a day  multivitamin 1 Tablet(s) Oral daily  pantoprazole    Tablet 40 milliGRAM(s) Oral before breakfast  tamsulosin 0.4 milliGRAM(s) Oral at bedtime    MEDICATIONS  (PRN):  dextrose 40% Gel 15 Gram(s) Oral once PRN Blood Glucose LESS THAN 70 milliGRAM(s)/deciliter  glucagon  Injectable 1 milliGRAM(s) IntraMuscular once PRN Glucose LESS THAN 70 milligrams/deciliter

## 2018-12-26 NOTE — PROCEDURE NOTE - PROCEDURE
<<-----Click on this checkbox to enter Procedure Vascular access with ultrasound guidance  12/26/2018  Patent left median vein  Active  JSTEELE  Peripheral intravenous catheter assessment  12/26/2018  #20 G  1.75"  IV ns flush good heme back left median vein  Active  JSTEELE

## 2018-12-26 NOTE — PROCEDURE NOTE - NSPROCDETAILS_GEN_ALL_CORE
blood seen on insertion/location identified, draped/prepped, sterile technique used/secured in place/sterile technique, catheter placed/ultrasound utilization/dressing applied/flushes easily
sterile technique, catheter placed/lumen(s) aspirated and flushed/sterile dressing applied/guidewire recovered/ultrasound guidance

## 2018-12-27 ENCOUNTER — TRANSCRIPTION ENCOUNTER (OUTPATIENT)
Age: 68
End: 2018-12-27

## 2018-12-27 VITALS
OXYGEN SATURATION: 97 % | DIASTOLIC BLOOD PRESSURE: 68 MMHG | SYSTOLIC BLOOD PRESSURE: 132 MMHG | TEMPERATURE: 99 F | RESPIRATION RATE: 16 BRPM | HEART RATE: 65 BPM

## 2018-12-27 LAB
GLUCOSE BLDC GLUCOMTR-MCNC: 155 MG/DL — HIGH (ref 70–99)
GLUCOSE BLDC GLUCOMTR-MCNC: 180 MG/DL — HIGH (ref 70–99)
HCT VFR BLD CALC: 24.1 % — LOW (ref 42–52)
HGB BLD-MCNC: 7.4 G/DL — LOW (ref 14–18)
MCHC RBC-ENTMCNC: 21 PG — LOW (ref 27–31)
MCHC RBC-ENTMCNC: 30.7 G/DL — LOW (ref 32–36)
MCV RBC AUTO: 68.3 FL — LOW (ref 80–94)
PLATELET # BLD AUTO: 220 K/UL — SIGNIFICANT CHANGE UP (ref 150–400)
RBC # BLD: 3.53 M/UL — LOW (ref 4.6–6.2)
RBC # FLD: 24.5 % — HIGH (ref 11–15.6)
WBC # BLD: 7.2 K/UL — SIGNIFICANT CHANGE UP (ref 4.8–10.8)
WBC # FLD AUTO: 7.2 K/UL — SIGNIFICANT CHANGE UP (ref 4.8–10.8)

## 2018-12-27 PROCEDURE — 81001 URINALYSIS AUTO W/SCOPE: CPT

## 2018-12-27 PROCEDURE — 84484 ASSAY OF TROPONIN QUANT: CPT

## 2018-12-27 PROCEDURE — 82962 GLUCOSE BLOOD TEST: CPT

## 2018-12-27 PROCEDURE — 36415 COLL VENOUS BLD VENIPUNCTURE: CPT

## 2018-12-27 PROCEDURE — 84436 ASSAY OF TOTAL THYROXINE: CPT

## 2018-12-27 PROCEDURE — 71045 X-RAY EXAM CHEST 1 VIEW: CPT

## 2018-12-27 PROCEDURE — 99285 EMERGENCY DEPT VISIT HI MDM: CPT | Mod: 25

## 2018-12-27 PROCEDURE — 83605 ASSAY OF LACTIC ACID: CPT

## 2018-12-27 PROCEDURE — 83690 ASSAY OF LIPASE: CPT

## 2018-12-27 PROCEDURE — 74176 CT ABD & PELVIS W/O CONTRAST: CPT

## 2018-12-27 PROCEDURE — 84443 ASSAY THYROID STIM HORMONE: CPT

## 2018-12-27 PROCEDURE — 87040 BLOOD CULTURE FOR BACTERIA: CPT

## 2018-12-27 PROCEDURE — 86850 RBC ANTIBODY SCREEN: CPT

## 2018-12-27 PROCEDURE — 36430 TRANSFUSION BLD/BLD COMPNT: CPT

## 2018-12-27 PROCEDURE — 82272 OCCULT BLD FECES 1-3 TESTS: CPT

## 2018-12-27 PROCEDURE — 86901 BLOOD TYPING SEROLOGIC RH(D): CPT

## 2018-12-27 PROCEDURE — 51702 INSERT TEMP BLADDER CATH: CPT

## 2018-12-27 PROCEDURE — 87186 SC STD MICRODIL/AGAR DIL: CPT

## 2018-12-27 PROCEDURE — 85730 THROMBOPLASTIN TIME PARTIAL: CPT

## 2018-12-27 PROCEDURE — 83880 ASSAY OF NATRIURETIC PEPTIDE: CPT

## 2018-12-27 PROCEDURE — 86900 BLOOD TYPING SEROLOGIC ABO: CPT

## 2018-12-27 PROCEDURE — 99239 HOSP IP/OBS DSCHRG MGMT >30: CPT

## 2018-12-27 PROCEDURE — 87086 URINE CULTURE/COLONY COUNT: CPT

## 2018-12-27 PROCEDURE — 85610 PROTHROMBIN TIME: CPT

## 2018-12-27 PROCEDURE — 93005 ELECTROCARDIOGRAM TRACING: CPT

## 2018-12-27 PROCEDURE — 83036 HEMOGLOBIN GLYCOSYLATED A1C: CPT

## 2018-12-27 PROCEDURE — 80053 COMPREHEN METABOLIC PANEL: CPT

## 2018-12-27 PROCEDURE — 80048 BASIC METABOLIC PNL TOTAL CA: CPT

## 2018-12-27 PROCEDURE — 85027 COMPLETE CBC AUTOMATED: CPT

## 2018-12-27 PROCEDURE — P9016: CPT

## 2018-12-27 PROCEDURE — 86923 COMPATIBILITY TEST ELECTRIC: CPT

## 2018-12-27 RX ORDER — METOPROLOL TARTRATE 50 MG
1 TABLET ORAL
Qty: 0 | Refills: 0 | DISCHARGE
Start: 2018-12-27

## 2018-12-27 RX ADMIN — Medication 1: at 12:04

## 2018-12-27 RX ADMIN — Medication 100 MILLIGRAM(S): at 05:50

## 2018-12-27 RX ADMIN — Medication 1 TABLET(S): at 12:04

## 2018-12-27 RX ADMIN — Medication 1: at 08:20

## 2018-12-27 RX ADMIN — PANTOPRAZOLE SODIUM 40 MILLIGRAM(S): 20 TABLET, DELAYED RELEASE ORAL at 05:50

## 2018-12-27 RX ADMIN — Medication 1 MILLIGRAM(S): at 12:04

## 2018-12-27 RX ADMIN — Medication 25 MILLIGRAM(S): at 05:50

## 2018-12-27 NOTE — DISCHARGE NOTE ADULT - CARE PLAN
Principal Discharge DX:	Chronic anemia  Goal:	Continue with iron supplement, ask your PMD to check your CBC in 2 weeks  Assessment and plan of treatment:	Follow up with hematologist  Secondary Diagnosis:	CKD (chronic kidney disease) stage 3, GFR 30-59 ml/min  Goal:	Follow with Dr Siu  Secondary Diagnosis:	Urinary retention  Goal:	Follow up with Urologist

## 2018-12-27 NOTE — PHYSICAL THERAPY INITIAL EVALUATION ADULT - ADDITIONAL COMMENTS
per patient he is comfortable navigating his house despite being blind. Per patient he only requires supervision when he is outside of his home or entering the house which has 1 step to enter. Per patient, daughter provides assistance as needed at baseline.

## 2018-12-27 NOTE — DISCHARGE NOTE ADULT - MEDICATION SUMMARY - MEDICATIONS TO TAKE
I will START or STAY ON the medications listed below when I get home from the hospital:    sodium bicarbonate 650 mg oral tablet  -- 1 tab(s) by mouth 2 times a day  -- Indication: For Home meds     tamsulosin 0.4 mg oral capsule  -- 2 cap(s) by mouth once a day (at bedtime)  -- Indication: For Home meds     metoprolol tartrate 25 mg oral tablet  -- 1 tab(s) by mouth 2 times a day  -- Indication: For Home meds     ferrous sulfate 325 mg (65 mg elemental iron) oral tablet  -- 1 tab(s) by mouth 3 times a day   -- Check with your doctor before becoming pregnant.  Do not chew, break, or crush.  May discolor urine or feces.    -- Indication: For Home meds     docusate sodium 100 mg oral capsule  -- 1 cap(s) by mouth 2 times a day  -- Indication: For Home meds     pantoprazole 40 mg oral delayed release tablet  -- 1 tab(s) by mouth 2 times a day  -- Indication: For Home meds     Multiple Vitamins oral tablet  -- 1 tab(s) by mouth once a day  -- Indication: For Home meds     folic acid 1 mg oral tablet  -- 1 tab(s) by mouth once a day  -- Indication: For Home meds

## 2018-12-27 NOTE — DISCHARGE NOTE ADULT - PLAN OF CARE
Continue with iron supplement, ask your PMD to check your CBC in 2 weeks Follow up with hematologist Follow with Dr Siu Follow up with Urologist

## 2018-12-27 NOTE — DISCHARGE NOTE ADULT - PROVIDER TOKENS
FREE:[LAST:[Hematology Office],PHONE:[(   )    -],FAX:[(   )    -],ADDRESS:[[facility]  MD Radha Kingsley MD  [phone]  Answering Sevice : [phone]    Please call this office and get an appiontement]],FREE:[LAST:[PMD],PHONE:[(   )    -],FAX:[(   )    -],ADDRESS:[in 2 weeks, ask your PMD to check your CBC]],FREE:[LAST:[Dr Carr [facility]],PHONE:[(   )    -],FAX:[(   )    -],ADDRESS:[in 2 weeks, please call his office and get an appiotment.]],FREE:[LAST:[Dr [facility]],PHONE:[(   )    -],FAX:[(   )    -],ADDRESS:[in 1 week, please call his office and get an appiotment]]

## 2018-12-27 NOTE — DISCHARGE NOTE ADULT - CARE PROVIDER_API CALL
Hematology Office,   [facility]  MD Radha Kingsley MD  [phone]  Answering Sevice : [phone]    Please call this office and get an appiontement  Phone: (   )    -  Fax: (   )    -    PMD,   in 2 weeks, ask your PMD to check your CBC  Phone: (   )    -  Fax: (   )    -    Dr Carr [facility],   in 2 weeks, please call his office and get an appiotment.  Phone: (   )    -  Fax: (   )    -    Dr [facility],   in 1 week, please call his office and get an appiotment  Phone: (   )    -  Fax: (   )    -

## 2018-12-27 NOTE — DISCHARGE NOTE ADULT - PATIENT PORTAL LINK FT
You can access the [facility] Patient Portal, offered by [facility], by registering with the following website: [email or web address]

## 2018-12-27 NOTE — CHART NOTE - NSCHARTNOTEFT_GEN_A_CORE
Called by RN to remove peripheral IV line from left forearm and I.J. from left jugular vein. Using sterile procedure I removed both IV lines from patient who tolerated procedure well. I applied direct pressure to both sites and noticed no bleeding after five minutes from either site. Sterile dressing applied to both sites and taped in place. He is being discharged today and is waiting for his daughter to arrive to take him home. Patient is in good spirits, laughing and joking around with me. Eager to be discharged to home.

## 2018-12-27 NOTE — DISCHARGE NOTE ADULT - HOSPITAL COURSE
69 y/o male, with h/o diet controlled DM II, chronic Warren's catheter. chronic anemia, legally blind male came to the ER because of generalized vague abdominal pain, patient did not have a bowel movement for 1 week Labs showed Hgb: 4.8 gm/dl. patient and family  denies any blood in the urine or stools. Stools Guaiac test is negative in the ER. U/A: showed UTI. h/o Sickle cell trait.  Patient was admitted under medicine under impression of symptomatic chronic anemia, like iron deficiency with anemia related to CKD, Patient is Juan's wittiness, he wanted to get blood transfusion this time, he was consented and got transfuse 2 units PRBC, his Hb came up from 4.5 to 7.3, rechecked next day and was 6.9, he was given an other unit of PRBCs transfusion now his hb is 7.4,  Stools Guaiac: Negative this time but was positive in the past, he was started on po ferrous sulfate and multivitamins, he was previously seen by Hematologist Dr Siu, he need to follow with him as out patient, and need to get his CBC checked again in 2 weeks.   Patient has Hx of CKD (chronic kidney disease) stage 3, GFR 30-59 ml/min, remained stable.  Patient UA was noted to be having WBCs, inially put on antibiotics for suspected UTI, Seen by urology as patient has chronic foleys, as per urology his urine is contaminated, d/ara antibiotics, he has no fever, no white count, he denies any symptoms, he will follow with urology as out patient.     Patient is doing well, he is seen by PT and he was noted to be doing ok, he has help at home as well as his services will be reinstated as he has legal blindness, he is stable for discharge home.     Vital Signs Last 24 Hrs  T(C): 37 (27 Dec 2018 08:18), Max: 37 (26 Dec 2018 11:34)  T(F): 98.6 (27 Dec 2018 08:18), Max: 98.6 (26 Dec 2018 11:34)  HR: 65 (27 Dec 2018 08:18) (63 - 80)  BP: 135/77 (27 Dec 2018 08:18) (106/62 - 151/77)  RR: 18 (27 Dec 2018 08:18) (14 - 19)  SpO2: 98% (27 Dec 2018 08:18) (94% - 98%)      PHYSICAL EXAM:    GENERAL: Elderly male looking comfortable  HEENT: Legally blind  NECK: soft, Supple, No JVD,   CHEST/LUNG: Decrease air entry bilaterally; No wheezing  HEART: S1S2+, Regular rate and rhythm; No murmurs  ABDOMEN: Soft, Nontender, Nondistended; Bowel sounds present  EXTREMITIES:  2+ Peripheral Pulses, No edema  SKIN: No rashes or lesions  NEURO: AAOX3, no focal deficits, no motor r sensory loss  PSYCH: normal mood    Total time spent 40 minutes

## 2018-12-29 LAB
CULTURE RESULTS: SIGNIFICANT CHANGE UP
CULTURE RESULTS: SIGNIFICANT CHANGE UP
SPECIMEN SOURCE: SIGNIFICANT CHANGE UP
SPECIMEN SOURCE: SIGNIFICANT CHANGE UP

## 2019-08-14 NOTE — PATIENT PROFILE ADULT. - DOES PATIENT HAVE ADVANCE DIRECTIVE
[FreeTextEntry3] : All medical record entries made by the Scribe were at my, Dr. Blakely's direction and personally dictated by me on 08/06/2019  I have reviewed the chart and agree that the record accurately reflects my personal performance of the history, physical exam, assessment and plan. I have also personally directed, reviewed, and agreed with the chart.\par \par 
No

## 2019-08-15 NOTE — PATIENT PROFILE ADULT - NSASFALLNEEDSASSISTWITH_GEN_A_NUR
[FreeTextEntry1] : Pt with above medical issues which requires extra work in addition to the well visit.\par Labs reviewed.\par Needs to lose weight.\par Needs to do fu colonoscopy.\par FU 6 months.
standing/walking/toileting

## 2019-08-27 NOTE — ED ADULT NURSE NOTE - CADM POA URETHRAL CATHETER
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 Bruno is a 70 year old male with a history of penile squamous cell carcinoma. He is s/p craniotomy on 7/25/19. The patient presented to the hospital for recurrent left sided weakness. Workup revealed recurrent metastatic squamous cell carcinoma at the prior surgical site. The patient was admitted for the above stated operation.            Hospital Course:   8/16/19: presented to the hospital. Underwent preoperative workup. Awaiting surgical operation and manufacture of gamma tile.   8/22/19: admitted to 4A post-op after uncomplicated operation.   8/23/19: patient doing well post-operatively and transferred to . The patient developed hyponatremia. The patient was started on salt tablets.   8/24-8/25: ongoing hyponatremia. No acute events.   8/26/19: weaned off 2% infusion.   8/27/19: no acute events overnight. Medically ready for discharge with stable sodium. found to be ambulating at baseline, voiding without a palomino, tolerating an oral diet, and pain was controlled. The patient had his baseline dysphagia secondary to his Zenker's diverticulum. The ENT team recommended outpatient follow-up and video swallow.   Follow-up:   Please follow up  with Dr Alfred Del Rio in Neurosurgery clinic in 2 weeks with repeat MRI brain     Please follow up with your primary Oncologist in 1-2 weeks     Please recheck serum sodium at rehab facility on 9/2/19 due to hyponatremia  Further instructions:    Decadron taper:  4 mg q8h -> beginning on 8/23  3 mg q8h -> beginning on 8/30  2 mg q8h -> beginning on 9/6  1 mg q8h -> beginning on 9/13  0 mg -> beginning on 9/20  Please continue protonix daily while on decadron.   Physical examination:   Temp:  [95.3  F (35.2  C)-97.3  F (36.3  C)] 95.3  F (35.2  C)  Pulse:  [78] 78  Heart Rate:  [58-72] 72  Resp:  [14-16] 16  BP: (129-150)/(60-79) 133/65  SpO2:  [98 %-100 %] 98 %  General: Awake;  Alert, In No Acute Distress  Pulm: Breathing Comfortably on room air  Mental status: Oriented x 3  Cranial Nerves: No dysarthria. Extraocular muscles intact. slight left facial droop.   Strength: 5/5 in right upper and lower. 4+/5 in the left upper and lower.   Sensory: Intact to Light Touch  INCISION: Clean, Dry and Intact with monocryl sutures in place         Discharge Medications:     Current Discharge Medication List      CONTINUE these medications which have NOT CHANGED    Details   acetaminophen (TYLENOL) 325 MG tablet Take 2 tablets (650 mg) by mouth every 4 hours as needed for mild pain or fever  Qty: 150 tablet, Refills: 0    Associated Diagnoses: Urethral cancer (H)      amLODIPine (NORVASC) 10 MG tablet Take 1 tablet (10 mg) by mouth daily    Associated Diagnoses: Benign essential hypertension      benzocaine-menthol (CEPACOL) 15-3.6 MG lozenge Place 1 lozenge inside cheek every hour as needed for sore throat  Qty: 30 lozenge, Refills: 0    Associated Diagnoses: Urethral cancer (H)      calcium carbonate 500 mg, elemental, (OSCAL;OYSTER SHELL CALCIUM) 500 MG tablet Take 1 tablet (500 mg) by mouth 2 times daily (with meals)  Qty: 60 tablet, Refills: 0    Associated Diagnoses: Urethral cancer (H)      cholecalciferol 1000 units TABS  Take 1,000 Units by mouth 2 times daily  Qty: 60 tablet, Refills: 0    Associated Diagnoses: Urethral cancer (H)      fluticasone (FLONASE) 50 MCG/ACT nasal spray Spray 2 sprays into both nostrils daily    Associated Diagnoses: 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)      omeprazole (PRILOSEC OTC) 20 MG EC tablet Take 1 tablet (20 mg) by mouth 2 times daily  Refills: 0    Associated Diagnoses: Gastroesophageal reflux disease with esophagitis      polyethylene glycol (MIRALAX/GLYCOLAX) packet Take 1 packet by mouth daily    Associated Diagnoses: Urethral cancer (H); 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 Be careful and ask for assistance when walking or going up and down stairs. Avoid any activities that could result in trauma to the surgical wound. Do not drive while using narcotics or other sedating medications, such as sleep aids, muscle relaxants, etc.   Discharge follow-up: Please follow up with Dr Alfred Del Rio in Neurosurgery clinic in 2 weeks with repeat MRI brain     Please follow up with your primary Oncologist in 1-2 weeks     Please recheck serum sodium at rehab facility on 9/2/19 due to hyponatremia    Wound care: Ok to shower,however no scrubbing of the wound and no soaking of the wound, meaning no bathtubs or swimming pools. Pat dry only. Leave wound open to air.  Sutures are absorbable.      Please call if you have:  1. increased pain, redness, drainage,  swelling at your incision  2. fevers > 101.5 F degrees  3. with any questions or concerns.  You may reach the Neurosurgery clinic at [phone] during regular work hours. ER at [phone].    and ask for the Neurosurgery Resident on call at [phone], during off hours or weekends.         Discharge Disposition:   Discharged to acute rehabilitation unit                Infection Confirmed

## 2019-11-22 ENCOUNTER — APPOINTMENT (OUTPATIENT)
Dept: UROLOGY | Facility: CLINIC | Age: 69
End: 2019-11-22

## 2020-01-10 ENCOUNTER — EMERGENCY (EMERGENCY)
Facility: HOSPITAL | Age: 70
LOS: 1 days | Discharge: HOME | End: 2020-01-10
Attending: EMERGENCY MEDICINE
Payer: MEDICARE

## 2020-01-10 VITALS
HEART RATE: 90 BPM | RESPIRATION RATE: 20 BRPM | HEIGHT: 66 IN | TEMPERATURE: 97 F | WEIGHT: 184.97 LBS | DIASTOLIC BLOOD PRESSURE: 90 MMHG | SYSTOLIC BLOOD PRESSURE: 183 MMHG | OXYGEN SATURATION: 98 %

## 2020-01-10 PROCEDURE — 99284 EMERGENCY DEPT VISIT MOD MDM: CPT

## 2020-01-10 RX ADMIN — Medication 1 TABLET(S): at 23:48

## 2020-01-10 NOTE — ED PROVIDER NOTE - CLINICAL SUMMARY MEDICAL DECISION MAKING FREE TEXT BOX
Plan: Plan: will check urine, drain the abscess, will treat with Bactrim. Will check basic labs, hemoglobin baseline 9 if all within normal will dc as pt has no other complaints.

## 2020-01-10 NOTE — ED ADULT TRIAGE NOTE - CHIEF COMPLAINT QUOTE
pt states he thinks his bourne is infection he states there is a bad smell. also states he is having difficulty eating his gums hurt.

## 2020-01-10 NOTE — ED PROVIDER NOTE - NSFOLLOWUPCLINICS_GEN_ALL_ED_FT
[facility]  [place] [address]  Phone: [phone]  Fax:   Follow Up Time:

## 2020-01-10 NOTE — ED PROVIDER NOTE - NSFOLLOWUPINSTRUCTIONS_ED_ALL_ED_FT
return to the ED if symptoms worsen, fever/chills , nausea/vomiting, abdominal pain. Follow up with Urology within 1 week. Follow up with PMD within 1 week.    use warm compresses for right axilla abscess 3x per day. take antibiotics    follow up with the dentist for toothache/gum irritation

## 2020-01-10 NOTE — ED PROVIDER NOTE - PATIENT PORTAL LINK FT
You can access the FollowMyHealth Patient Portal offered by [facility] by registering at the following website: [email or web address]. By joining [employer]’s FollowMyHealth portal, you will also be able to view your health information using other applications (apps) compatible with our system.

## 2020-01-10 NOTE — ED PROVIDER NOTE - CARE PLAN
Principal Discharge DX:	Abscess  Goal:	left axilla  Secondary Diagnosis:	Urinary tract infection without hematuria, site unspecified

## 2020-01-10 NOTE — ED PROVIDER NOTE - PHYSICAL EXAMINATION
Head: atraumatic, normacephalic  throat: uvula midline no exudates. No swelling to lips, tongue, or uvula. No erythema to gingiva. No carries. Some decay in teeth.  eyes: perrla eomi  heart: rrr s1s2  lungs: ctab  abd: soft, nt nd +bs no rebound/guarding no cva ttp  skin: Erythematous abscess under right axilla, fluctuance with drainage.   LE: no swelling, no calf ttp  back: no midline cervical/thoracic/lumbar ttp

## 2020-01-10 NOTE — ED PROVIDER NOTE - OBJECTIVE STATEMENT
70 y/o male with a PMHx of chronic anemia, CKD, DM, other secondary cataract of both eyes, urinary retention presents to the ED c/o urinary catheter complications since 68 y/o male with a PMHx of chronic anemia, CKD, DM, other secondary cataract of both eyes, urinary retention presents to the ED c/o urinary catheter complications since 1 month ago. Pt reports a foul odor coming from his [place]. Pt had [place] changed by urologist x2 days ago. Denies hematuria and clotting. Pt placed on [place] 2 years ago due to enlarged prostate and bladder complications. Pt also comes in c/o intermittent gum soreness and pain since 1 week ago. (+)abd pain, constipation. Pt states this pain keeps him awake at night. Pt also c/o of abscess underneath left armpit since yesterday. Pt states he found the abscess yesterday and his aide did not notice it.  No EtOH abuse. No illicit substance abuse. Currently takes Pepcid and Lactulose. Allergic to amoxicillin. Lives with daughter. Has an aide at home. Denies f/c/n/v/cp/sob/palpitations/cough/rash/headache/dizziness/d/dysuria/hematuria 68 y/o male with a PMHx of chronic anemia, CKD, DM, other secondary cataract of both eyes, urinary retention presents to the ED c/o urinary catheter complications since 1 month ago. Pt reports a foul odor coming from his [place]. Pt had [place] changed by urologist x2 days ago. Denies hematuria and clotting. Pt placed on [place] 2 years ago due to enlarged prostate and bladder complications. Pt also comes in c/o intermittent gum soreness and pain since 1 week ago. Pt states this pain keeps him awake at night. Pt also c/o of abscess underneath left armpit since yesterday. Pt states he found the abscess yesterday and his aide did not notice it.  No EtOH abuse. No illicit substance abuse. Currently takes Pepcid and Lactulose. Allergic to amoxicillin. Lives with daughter. Has an aide at home. Denies f/c/n/v/cp/sob/palpitations/cough/rash/headache/dizziness/abdominal pain. /d/dysuria/hematuria. no sick contacts no recent travel

## 2020-01-10 NOTE — ED PROVIDER NOTE - PROGRESS NOTE DETAILS
pt feels better hgb nd cr at baseline as per pt + ua will tx with bactrim for abscess and ua--pt to follow up with pmd and urology will dc

## 2020-01-11 VITALS
OXYGEN SATURATION: 100 % | TEMPERATURE: 98 F | RESPIRATION RATE: 20 BRPM | HEART RATE: 83 BPM | DIASTOLIC BLOOD PRESSURE: 60 MMHG | SYSTOLIC BLOOD PRESSURE: 118 MMHG

## 2020-01-11 LAB
ALBUMIN SERPL ELPH-MCNC: 3.6 G/DL — SIGNIFICANT CHANGE UP (ref 3.3–5.2)
ALP SERPL-CCNC: 65 U/L — SIGNIFICANT CHANGE UP (ref 40–120)
ALT FLD-CCNC: 12 U/L — SIGNIFICANT CHANGE UP
ANION GAP SERPL CALC-SCNC: 12 MMOL/L — SIGNIFICANT CHANGE UP (ref 5–17)
APPEARANCE UR: CLEAR — SIGNIFICANT CHANGE UP
AST SERPL-CCNC: 42 U/L — HIGH
BACTERIA # UR AUTO: ABNORMAL
BASOPHILS # BLD AUTO: 0.04 K/UL — SIGNIFICANT CHANGE UP (ref 0–0.2)
BASOPHILS NFR BLD AUTO: 0.7 % — SIGNIFICANT CHANGE UP (ref 0–2)
BILIRUB SERPL-MCNC: 0.3 MG/DL — LOW (ref 0.4–2)
BILIRUB UR-MCNC: NEGATIVE — SIGNIFICANT CHANGE UP
BUN SERPL-MCNC: 19 MG/DL — SIGNIFICANT CHANGE UP (ref 8–20)
CALCIUM SERPL-MCNC: 8.5 MG/DL — LOW (ref 8.6–10.2)
CHLORIDE SERPL-SCNC: 106 MMOL/L — SIGNIFICANT CHANGE UP (ref 98–107)
CO2 SERPL-SCNC: 20 MMOL/L — LOW (ref 22–29)
COLOR SPEC: YELLOW — SIGNIFICANT CHANGE UP
CREAT SERPL-MCNC: 2.02 MG/DL — HIGH (ref 0.5–1.3)
DIFF PNL FLD: ABNORMAL
EOSINOPHIL # BLD AUTO: 0.12 K/UL — SIGNIFICANT CHANGE UP (ref 0–0.5)
EOSINOPHIL NFR BLD AUTO: 2 % — SIGNIFICANT CHANGE UP (ref 0–6)
EPI CELLS # UR: SIGNIFICANT CHANGE UP
GLUCOSE SERPL-MCNC: 307 MG/DL — HIGH (ref 70–115)
GLUCOSE UR QL: 250
HCT VFR BLD CALC: 28.5 % — LOW (ref 39–50)
HGB BLD-MCNC: 9 G/DL — LOW (ref 13–17)
IMM GRANULOCYTES NFR BLD AUTO: 0.2 % — SIGNIFICANT CHANGE UP (ref 0–1.5)
KETONES UR-MCNC: NEGATIVE — SIGNIFICANT CHANGE UP
LEUKOCYTE ESTERASE UR-ACNC: ABNORMAL
LYMPHOCYTES # BLD AUTO: 0.86 K/UL — LOW (ref 1–3.3)
LYMPHOCYTES # BLD AUTO: 14.4 % — SIGNIFICANT CHANGE UP (ref 13–44)
MCHC RBC-ENTMCNC: 25.6 PG — LOW (ref 27–34)
MCHC RBC-ENTMCNC: 31.6 GM/DL — LOW (ref 32–36)
MCV RBC AUTO: 81 FL — SIGNIFICANT CHANGE UP (ref 80–100)
MONOCYTES # BLD AUTO: 0.62 K/UL — SIGNIFICANT CHANGE UP (ref 0–0.9)
MONOCYTES NFR BLD AUTO: 10.4 % — SIGNIFICANT CHANGE UP (ref 2–14)
NEUTROPHILS # BLD AUTO: 4.32 K/UL — SIGNIFICANT CHANGE UP (ref 1.8–7.4)
NEUTROPHILS NFR BLD AUTO: 72.3 % — SIGNIFICANT CHANGE UP (ref 43–77)
NITRITE UR-MCNC: POSITIVE
PH UR: 6 — SIGNIFICANT CHANGE UP (ref 5–8)
PLATELET # BLD AUTO: 241 K/UL — SIGNIFICANT CHANGE UP (ref 150–400)
POTASSIUM SERPL-MCNC: 4.5 MMOL/L — SIGNIFICANT CHANGE UP (ref 3.5–5.3)
POTASSIUM SERPL-SCNC: 4.5 MMOL/L — SIGNIFICANT CHANGE UP (ref 3.5–5.3)
PROT SERPL-MCNC: 6.7 G/DL — SIGNIFICANT CHANGE UP (ref 6.6–8.7)
PROT UR-MCNC: 30 MG/DL
RBC # BLD: 3.52 M/UL — LOW (ref 4.2–5.8)
RBC # FLD: 15.4 % — HIGH (ref 10.3–14.5)
RBC CASTS # UR COMP ASSIST: ABNORMAL /HPF (ref 0–4)
SODIUM SERPL-SCNC: 138 MMOL/L — SIGNIFICANT CHANGE UP (ref 135–145)
SP GR SPEC: 1.01 — SIGNIFICANT CHANGE UP (ref 1.01–1.02)
UROBILINOGEN FLD QL: NEGATIVE — SIGNIFICANT CHANGE UP
WBC # BLD: 5.97 K/UL — SIGNIFICANT CHANGE UP (ref 3.8–10.5)
WBC # FLD AUTO: 5.97 K/UL — SIGNIFICANT CHANGE UP (ref 3.8–10.5)
WBC UR QL: ABNORMAL

## 2020-01-11 PROCEDURE — 99283 EMERGENCY DEPT VISIT LOW MDM: CPT

## 2020-01-11 PROCEDURE — 85027 COMPLETE CBC AUTOMATED: CPT

## 2020-01-11 PROCEDURE — 87086 URINE CULTURE/COLONY COUNT: CPT

## 2020-01-11 PROCEDURE — 81001 URINALYSIS AUTO W/SCOPE: CPT

## 2020-01-11 PROCEDURE — 36415 COLL VENOUS BLD VENIPUNCTURE: CPT

## 2020-01-11 PROCEDURE — 80053 COMPREHEN METABOLIC PANEL: CPT

## 2020-01-11 RX ORDER — AZTREONAM 2 G
1 VIAL (EA) INJECTION
Qty: 20 | Refills: 0
Start: 2020-01-11 | End: 2020-01-20

## 2020-01-11 NOTE — ED ADULT NURSE REASSESSMENT NOTE - NS ED NURSE REASSESS COMMENT FT1
Leg bag applied. Patient in no apparent distress. States "I am ready to go home". Provided discharge education with printed instructions. Patient verbalized understanding. Accompanied by son on discharge.

## 2020-01-11 NOTE — ED ADULT NURSE NOTE - NSIMPLEMENTINTERV_GEN_ALL_ED
Implemented All Fall Risk Interventions:  [place] to call system. Call bell, personal items and telephone within reach. Instruct patient to call for assistance. Room bathroom lighting operational. Non-slip footwear when patient is off stretcher. Physically safe environment: no spills, clutter or unnecessary equipment. Stretcher in lowest position, wheels locked, appropriate side rails in place. Provide visual cue, wrist band, yellow gown, etc. Monitor gait and stability. Monitor for mental status changes and reorient to person, place, and time. Review medications for side effects contributing to fall risk. Reinforce activity limits and safety measures with patient and family.

## 2020-01-11 NOTE — ED ADULT NURSE NOTE - OBJECTIVE STATEMENT
Patient is a 69 year old male, in no apparent distress. c/o urinary catheter problems. Patient had bourne changed Wednesday and has been having a foul odor. Leg bag changed as per MD Felton. Yellow urine noted in leg bag. No hematuria noted. Patient complaining of gum soreness. Patient had bourne placed for prostate enlargement.

## 2020-01-13 LAB
CULTURE RESULTS: SIGNIFICANT CHANGE UP
SPECIMEN SOURCE: SIGNIFICANT CHANGE UP

## 2020-09-06 ENCOUNTER — EMERGENCY (EMERGENCY)
Facility: HOSPITAL | Age: 70
LOS: 1 days | Discharge: HOME | End: 2020-09-06
Attending: STUDENT IN AN ORGANIZED HEALTH CARE EDUCATION/TRAINING PROGRAM
Payer: MEDICARE

## 2020-09-06 VITALS
HEART RATE: 87 BPM | WEIGHT: 242.95 LBS | OXYGEN SATURATION: 99 % | TEMPERATURE: 98 F | HEIGHT: 71 IN | SYSTOLIC BLOOD PRESSURE: 173 MMHG | RESPIRATION RATE: 18 BRPM | DIASTOLIC BLOOD PRESSURE: 93 MMHG

## 2020-09-06 VITALS
HEART RATE: 79 BPM | SYSTOLIC BLOOD PRESSURE: 157 MMHG | OXYGEN SATURATION: 99 % | DIASTOLIC BLOOD PRESSURE: 90 MMHG | TEMPERATURE: 98 F | RESPIRATION RATE: 18 BRPM

## 2020-09-06 LAB
ALBUMIN SERPL ELPH-MCNC: 3.8 G/DL — SIGNIFICANT CHANGE UP (ref 3.3–5.2)
ALP SERPL-CCNC: 67 U/L — SIGNIFICANT CHANGE UP (ref 40–120)
ALT FLD-CCNC: 17 U/L — SIGNIFICANT CHANGE UP
ANION GAP SERPL CALC-SCNC: 11 MMOL/L — SIGNIFICANT CHANGE UP (ref 5–17)
APPEARANCE UR: ABNORMAL
AST SERPL-CCNC: 64 U/L — HIGH
BILIRUB SERPL-MCNC: 0.3 MG/DL — LOW (ref 0.4–2)
BILIRUB UR-MCNC: NEGATIVE — SIGNIFICANT CHANGE UP
BUN SERPL-MCNC: 28 MG/DL — HIGH (ref 8–20)
CALCIUM SERPL-MCNC: 8.9 MG/DL — SIGNIFICANT CHANGE UP (ref 8.6–10.2)
CHLORIDE SERPL-SCNC: 104 MMOL/L — SIGNIFICANT CHANGE UP (ref 98–107)
CO2 SERPL-SCNC: 21 MMOL/L — LOW (ref 22–29)
COLOR SPEC: ABNORMAL
CREAT SERPL-MCNC: 1.63 MG/DL — HIGH (ref 0.5–1.3)
DIFF PNL FLD: ABNORMAL
GLUCOSE SERPL-MCNC: 222 MG/DL — HIGH (ref 70–99)
GLUCOSE UR QL: NEGATIVE — SIGNIFICANT CHANGE UP
KETONES UR-MCNC: ABNORMAL
LEUKOCYTE ESTERASE UR-ACNC: ABNORMAL
NITRITE UR-MCNC: POSITIVE
PH UR: 6 — SIGNIFICANT CHANGE UP (ref 5–8)
POTASSIUM SERPL-MCNC: 5.1 MMOL/L — SIGNIFICANT CHANGE UP (ref 3.5–5.3)
POTASSIUM SERPL-SCNC: 5.1 MMOL/L — SIGNIFICANT CHANGE UP (ref 3.5–5.3)
PROT SERPL-MCNC: 7.2 G/DL — SIGNIFICANT CHANGE UP (ref 6.6–8.7)
PROT UR-MCNC: 100
SODIUM SERPL-SCNC: 136 MMOL/L — SIGNIFICANT CHANGE UP (ref 135–145)
SP GR SPEC: 1.01 — SIGNIFICANT CHANGE UP (ref 1.01–1.02)
UROBILINOGEN FLD QL: NEGATIVE — SIGNIFICANT CHANGE UP

## 2020-09-06 PROCEDURE — 87086 URINE CULTURE/COLONY COUNT: CPT

## 2020-09-06 PROCEDURE — 80053 COMPREHEN METABOLIC PANEL: CPT

## 2020-09-06 PROCEDURE — 51702 INSERT TEMP BLADDER CATH: CPT

## 2020-09-06 PROCEDURE — 81001 URINALYSIS AUTO W/SCOPE: CPT

## 2020-09-06 PROCEDURE — 85027 COMPLETE CBC AUTOMATED: CPT

## 2020-09-06 PROCEDURE — 99283 EMERGENCY DEPT VISIT LOW MDM: CPT | Mod: 25

## 2020-09-06 PROCEDURE — 99284 EMERGENCY DEPT VISIT MOD MDM: CPT

## 2020-09-06 PROCEDURE — 36415 COLL VENOUS BLD VENIPUNCTURE: CPT

## 2020-09-06 RX ORDER — LIDOCAINE HCL 20 MG/ML
30 VIAL (ML) INJECTION ONCE
Refills: 0 | Status: COMPLETED | OUTPATIENT
Start: 2020-09-06 | End: 2020-09-06

## 2020-09-06 RX ORDER — AZTREONAM 2 G
1 VIAL (EA) INJECTION
Qty: 14 | Refills: 0
Start: 2020-09-06 | End: 2020-09-12

## 2020-09-06 RX ADMIN — Medication 1 TABLET(S): at 15:50

## 2020-09-06 RX ADMIN — Medication 30 MILLILITER(S): at 14:35

## 2020-09-06 NOTE — ED ADULT NURSE REASSESSMENT NOTE - NS ED NURSE REASSESS COMMENT FT1
spoke with wife, on their way to get  to take home, explained pt UTI, has meds, bourne was changed  pt feels better, leg bag applied

## 2020-09-06 NOTE — ED PROVIDER NOTE - ATTENDING CONTRIBUTION TO CARE
I performed a face to face history and physical exam of the patient and discussed their management with the student/resident/ACP. I reviewed the student/resident/ACP's note and agree with the documented findings and plan of care.      Pt with hematuria. Pt with chronic indwelling bourne noticed blood 2 d ago which has improved but was still present today. no urologist currently.    physical - rrr. ctab. abd - soft, nt. no edema. no rash. urine in bourne clear.    plan -  labs reviewed.  bourne replaced. abx and urology f/up.

## 2020-09-06 NOTE — ED PROVIDER NOTE - PATIENT PORTAL LINK FT
You can access the FollowMyHealth Patient Portal offered by [facility] by registering at the following website: [email or web address]. By joining [facility] FollowMyHealth portal, you will also be able to view your health information using other applications (apps) compatible with our system.

## 2020-09-06 NOTE — ED ADULT TRIAGE NOTE - CHIEF COMPLAINT QUOTE
pt arrive by ambulance with reports of hematuria, noted red blood in catheter that is now getting darker. pt assisted by home RN, legally blind. catheter has been present for approx 2 years, unknown why.

## 2020-09-06 NOTE — ED PROVIDER NOTE - OBJECTIVE STATEMENT
Pt is a 71 yo M with CKD, DM, and chronic indwelling urinary catheter presenting with 2 days of hematuria. Pt states he had hematuria a few weeks ago, went to [facility] where he got antibiotics, the hematuria cleared, the hematuria returned 2 days ago, Pt is a 71 yo M with CKD, DM, and chronic indwelling urinary catheter presenting with 2 days of hematuria. Pt states he had hematuria a few weeks ago, went to [facility] where he got antibiotics, the hematuria cleared, the hematuria returned 2 days ago, pt states it was initially bright red and then turned dark. Denies fever, back pain, dysuria, leg swelling, SOB, CP.

## 2020-09-07 LAB
CULTURE RESULTS: SIGNIFICANT CHANGE UP
SPECIMEN SOURCE: SIGNIFICANT CHANGE UP

## 2020-09-08 NOTE — ED POST DISCHARGE NOTE - DETAILS
Results discussed with pts emergency contact Amelia. Pt to follow up with PCP for repeat urine testing

## 2020-12-16 PROBLEM — Z87.440 HISTORY OF URINARY TRACT INFECTION: Status: RESOLVED | Noted: 2018-07-23 | Resolved: 2020-12-16

## 2021-02-22 NOTE — ED ADULT TRIAGE NOTE - HEIGHT IN INCHES
COVID-19 PCR test completed. Patient handout For Patients Who Have Been Tested for Covid-19 (Coronavirus) was given to the patient, which includes test result notification process.    
10

## 2021-04-19 ENCOUNTER — INPATIENT (INPATIENT)
Facility: HOSPITAL | Age: 71
LOS: 11 days | Discharge: HOME | DRG: 698 | End: 2021-05-01
Attending: INTERNAL MEDICINE | Admitting: HOSPITALIST
Payer: MEDICARE

## 2021-04-19 VITALS
WEIGHT: 225.09 LBS | RESPIRATION RATE: 22 BRPM | OXYGEN SATURATION: 97 % | HEART RATE: 103 BPM | DIASTOLIC BLOOD PRESSURE: 73 MMHG | HEIGHT: 71 IN | TEMPERATURE: 101 F | SYSTOLIC BLOOD PRESSURE: 147 MMHG

## 2021-04-19 DIAGNOSIS — H26.493 OTHER SECONDARY CATARACT, BILATERAL: ICD-10-CM

## 2021-04-19 DIAGNOSIS — A41.9 SEPSIS, UNSPECIFIED ORGANISM: ICD-10-CM

## 2021-04-19 DIAGNOSIS — T83.511A INFECTION AND INFLAMMATORY REACTION DUE TO INDWELLING URETHRAL CATHETER, INITIAL ENCOUNTER: ICD-10-CM

## 2021-04-19 DIAGNOSIS — R33.9 RETENTION OF URINE, UNSPECIFIED: ICD-10-CM

## 2021-04-19 DIAGNOSIS — E11.22 TYPE 2 DIABETES MELLITUS WITH DIABETIC CHRONIC KIDNEY DISEASE: ICD-10-CM

## 2021-04-19 DIAGNOSIS — D64.9 ANEMIA, UNSPECIFIED: ICD-10-CM

## 2021-04-19 DIAGNOSIS — I10 ESSENTIAL (PRIMARY) HYPERTENSION: ICD-10-CM

## 2021-04-19 LAB
ALBUMIN SERPL ELPH-MCNC: 3.8 G/DL — SIGNIFICANT CHANGE UP (ref 3.3–5.2)
ALP SERPL-CCNC: 82 U/L — SIGNIFICANT CHANGE UP (ref 40–120)
ALT FLD-CCNC: 14 U/L — SIGNIFICANT CHANGE UP
ANION GAP SERPL CALC-SCNC: 14 MMOL/L — SIGNIFICANT CHANGE UP (ref 5–17)
APPEARANCE UR: ABNORMAL
APTT BLD: 28.2 SEC — SIGNIFICANT CHANGE UP (ref 27.5–35.5)
AST SERPL-CCNC: 39 U/L — SIGNIFICANT CHANGE UP
BASOPHILS # BLD AUTO: 0 K/UL — SIGNIFICANT CHANGE UP (ref 0–0.2)
BASOPHILS NFR BLD AUTO: 0 % — SIGNIFICANT CHANGE UP (ref 0–2)
BILIRUB SERPL-MCNC: 0.9 MG/DL — SIGNIFICANT CHANGE UP (ref 0.4–2)
BILIRUB UR-MCNC: NEGATIVE — SIGNIFICANT CHANGE UP
BUN SERPL-MCNC: 24 MG/DL — HIGH (ref 8–20)
CALCIUM SERPL-MCNC: 8.5 MG/DL — LOW (ref 8.6–10.2)
CHLORIDE SERPL-SCNC: 98 MMOL/L — SIGNIFICANT CHANGE UP (ref 98–107)
CO2 SERPL-SCNC: 21 MMOL/L — LOW (ref 22–29)
COLOR SPEC: YELLOW — SIGNIFICANT CHANGE UP
CREAT SERPL-MCNC: 1.73 MG/DL — HIGH (ref 0.5–1.3)
DIFF PNL FLD: ABNORMAL
EOSINOPHIL # BLD AUTO: 0 K/UL — SIGNIFICANT CHANGE UP (ref 0–0.5)
EOSINOPHIL NFR BLD AUTO: 0 % — SIGNIFICANT CHANGE UP (ref 0–6)
GLUCOSE SERPL-MCNC: 218 MG/DL — HIGH (ref 70–99)
GLUCOSE UR QL: NEGATIVE MG/DL — SIGNIFICANT CHANGE UP
HCT VFR BLD CALC: 27 % — LOW (ref 39–50)
HGB BLD-MCNC: 8.3 G/DL — LOW (ref 13–17)
INR BLD: 1.36 RATIO — HIGH (ref 0.88–1.16)
KETONES UR-MCNC: NEGATIVE — SIGNIFICANT CHANGE UP
LACTATE BLDV-MCNC: 1.6 MMOL/L — SIGNIFICANT CHANGE UP (ref 0.5–2)
LEUKOCYTE ESTERASE UR-ACNC: ABNORMAL
LYMPHOCYTES # BLD AUTO: 1 K/UL — SIGNIFICANT CHANGE UP (ref 1–3.3)
LYMPHOCYTES # BLD AUTO: 9.6 % — LOW (ref 13–44)
MCHC RBC-ENTMCNC: 20.5 PG — LOW (ref 27–34)
MCHC RBC-ENTMCNC: 30.7 GM/DL — LOW (ref 32–36)
MCV RBC AUTO: 66.7 FL — LOW (ref 80–100)
MONOCYTES # BLD AUTO: 1.38 K/UL — HIGH (ref 0–0.9)
MONOCYTES NFR BLD AUTO: 13.2 % — SIGNIFICANT CHANGE UP (ref 2–14)
NEUTROPHILS # BLD AUTO: 8.06 K/UL — HIGH (ref 1.8–7.4)
NEUTROPHILS NFR BLD AUTO: 77.2 % — HIGH (ref 43–77)
NITRITE UR-MCNC: NEGATIVE — SIGNIFICANT CHANGE UP
PH UR: 6 — SIGNIFICANT CHANGE UP (ref 5–8)
PLATELET # BLD AUTO: 280 K/UL — SIGNIFICANT CHANGE UP (ref 150–400)
POTASSIUM SERPL-MCNC: 4.2 MMOL/L — SIGNIFICANT CHANGE UP (ref 3.5–5.3)
POTASSIUM SERPL-SCNC: 4.2 MMOL/L — SIGNIFICANT CHANGE UP (ref 3.5–5.3)
PROT SERPL-MCNC: 7.9 G/DL — SIGNIFICANT CHANGE UP (ref 6.6–8.7)
PROT UR-MCNC: 100 MG/DL
PROTHROM AB SERPL-ACNC: 15.6 SEC — HIGH (ref 10.6–13.6)
RAPID RVP RESULT: SIGNIFICANT CHANGE UP
RBC # BLD: 4.05 M/UL — LOW (ref 4.2–5.8)
RBC # FLD: 20.4 % — HIGH (ref 10.3–14.5)
SARS-COV-2 RNA SPEC QL NAA+PROBE: SIGNIFICANT CHANGE UP
SODIUM SERPL-SCNC: 132 MMOL/L — LOW (ref 135–145)
SP GR SPEC: 1.01 — SIGNIFICANT CHANGE UP (ref 1.01–1.02)
UROBILINOGEN FLD QL: NEGATIVE MG/DL — SIGNIFICANT CHANGE UP
WBC # BLD: 10.44 K/UL — SIGNIFICANT CHANGE UP (ref 3.8–10.5)
WBC # FLD AUTO: 10.44 K/UL — SIGNIFICANT CHANGE UP (ref 3.8–10.5)

## 2021-04-19 PROCEDURE — 99223 1ST HOSP IP/OBS HIGH 75: CPT

## 2021-04-19 PROCEDURE — 99291 CRITICAL CARE FIRST HOUR: CPT | Mod: CS,GC

## 2021-04-19 PROCEDURE — 93010 ELECTROCARDIOGRAM REPORT: CPT

## 2021-04-19 PROCEDURE — 71045 X-RAY EXAM CHEST 1 VIEW: CPT | Mod: 26

## 2021-04-19 RX ORDER — CEFEPIME 1 G/1
2000 INJECTION, POWDER, FOR SOLUTION INTRAMUSCULAR; INTRAVENOUS ONCE
Refills: 0 | Status: COMPLETED | OUTPATIENT
Start: 2021-04-19 | End: 2021-04-19

## 2021-04-19 RX ORDER — ACETAMINOPHEN 500 MG
650 TABLET ORAL ONCE
Refills: 0 | Status: COMPLETED | OUTPATIENT
Start: 2021-04-19 | End: 2021-04-19

## 2021-04-19 RX ORDER — SACCHAROMYCES BOULARDII 250 MG
250 POWDER IN PACKET (EA) ORAL
Refills: 0 | Status: DISCONTINUED | OUTPATIENT
Start: 2021-04-19 | End: 2021-05-01

## 2021-04-19 RX ORDER — CEFEPIME 1 G/1
2000 INJECTION, POWDER, FOR SOLUTION INTRAMUSCULAR; INTRAVENOUS EVERY 8 HOURS
Refills: 0 | Status: DISCONTINUED | OUTPATIENT
Start: 2021-04-19 | End: 2021-04-19

## 2021-04-19 RX ORDER — FOLIC ACID 0.8 MG
1 TABLET ORAL DAILY
Refills: 0 | Status: DISCONTINUED | OUTPATIENT
Start: 2021-04-19 | End: 2021-05-01

## 2021-04-19 RX ORDER — VANCOMYCIN HCL 1 G
1000 VIAL (EA) INTRAVENOUS ONCE
Refills: 0 | Status: COMPLETED | OUTPATIENT
Start: 2021-04-19 | End: 2021-04-19

## 2021-04-19 RX ORDER — SODIUM CHLORIDE 9 MG/ML
2300 INJECTION, SOLUTION INTRAVENOUS ONCE
Refills: 0 | Status: COMPLETED | OUTPATIENT
Start: 2021-04-19 | End: 2021-04-19

## 2021-04-19 RX ORDER — METOPROLOL TARTRATE 50 MG
25 TABLET ORAL
Refills: 0 | Status: DISCONTINUED | OUTPATIENT
Start: 2021-04-19 | End: 2021-05-01

## 2021-04-19 RX ORDER — CEFTRIAXONE 500 MG/1
1000 INJECTION, POWDER, FOR SOLUTION INTRAMUSCULAR; INTRAVENOUS EVERY 24 HOURS
Refills: 0 | Status: DISCONTINUED | OUTPATIENT
Start: 2021-04-20 | End: 2021-04-22

## 2021-04-19 RX ORDER — HEPARIN SODIUM 5000 [USP'U]/ML
5000 INJECTION INTRAVENOUS; SUBCUTANEOUS EVERY 12 HOURS
Refills: 0 | Status: DISCONTINUED | OUTPATIENT
Start: 2021-04-19 | End: 2021-05-01

## 2021-04-19 RX ORDER — CEFEPIME 1 G/1
INJECTION, POWDER, FOR SOLUTION INTRAMUSCULAR; INTRAVENOUS
Refills: 0 | Status: DISCONTINUED | OUTPATIENT
Start: 2021-04-19 | End: 2021-04-19

## 2021-04-19 RX ORDER — TAMSULOSIN HYDROCHLORIDE 0.4 MG/1
0.8 CAPSULE ORAL AT BEDTIME
Refills: 0 | Status: DISCONTINUED | OUTPATIENT
Start: 2021-04-19 | End: 2021-05-01

## 2021-04-19 RX ORDER — PANTOPRAZOLE SODIUM 20 MG/1
40 TABLET, DELAYED RELEASE ORAL
Refills: 0 | Status: DISCONTINUED | OUTPATIENT
Start: 2021-04-19 | End: 2021-04-23

## 2021-04-19 RX ORDER — SODIUM CHLORIDE 9 MG/ML
3 INJECTION INTRAMUSCULAR; INTRAVENOUS; SUBCUTANEOUS EVERY 8 HOURS
Refills: 0 | Status: DISCONTINUED | OUTPATIENT
Start: 2021-04-19 | End: 2021-05-01

## 2021-04-19 RX ORDER — ONDANSETRON 8 MG/1
4 TABLET, FILM COATED ORAL EVERY 6 HOURS
Refills: 0 | Status: DISCONTINUED | OUTPATIENT
Start: 2021-04-19 | End: 2021-05-01

## 2021-04-19 RX ORDER — SODIUM BICARBONATE 1 MEQ/ML
650 SYRINGE (ML) INTRAVENOUS
Refills: 0 | Status: DISCONTINUED | OUTPATIENT
Start: 2021-04-19 | End: 2021-05-01

## 2021-04-19 RX ADMIN — CEFEPIME 100 MILLIGRAM(S): 1 INJECTION, POWDER, FOR SOLUTION INTRAMUSCULAR; INTRAVENOUS at 16:20

## 2021-04-19 RX ADMIN — SODIUM CHLORIDE 2300 MILLILITER(S): 9 INJECTION, SOLUTION INTRAVENOUS at 16:55

## 2021-04-19 RX ADMIN — SODIUM CHLORIDE 3 MILLILITER(S): 9 INJECTION INTRAMUSCULAR; INTRAVENOUS; SUBCUTANEOUS at 22:25

## 2021-04-19 RX ADMIN — CEFTRIAXONE 100 MILLIGRAM(S): 500 INJECTION, POWDER, FOR SOLUTION INTRAMUSCULAR; INTRAVENOUS at 23:18

## 2021-04-19 RX ADMIN — Medication 650 MILLIGRAM(S): at 17:51

## 2021-04-19 RX ADMIN — Medication 250 MILLIGRAM(S): at 18:10

## 2021-04-19 RX ADMIN — CEFEPIME 2000 MILLIGRAM(S): 1 INJECTION, POWDER, FOR SOLUTION INTRAMUSCULAR; INTRAVENOUS at 16:50

## 2021-04-19 RX ADMIN — SODIUM CHLORIDE 2300 MILLILITER(S): 9 INJECTION, SOLUTION INTRAVENOUS at 15:55

## 2021-04-19 RX ADMIN — Medication 650 MILLIGRAM(S): at 16:45

## 2021-04-19 NOTE — ED PROVIDER NOTE - CLINICAL SUMMARY MEDICAL DECISION MAKING FREE TEXT BOX
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 Recently dx with UTI. Code sepsis called in triage. Pt tachy and febrile. Likely Urine as source. will start broad spectrum abx, IVF bolus, cbc/cmp,UA, Blood and urine cultures. ekg, cxr. admit.

## 2021-04-19 NOTE — H&P ADULT - NSICDXFAMILYHX_GEN_ALL_CORE_FT
FAMILY HISTORY:  Mother  Still living? Unknown  Family history of diabetes mellitus, Age at diagnosis: Age Unknown

## 2021-04-19 NOTE — H&P ADULT - PROBLEM SELECTOR PLAN 5
Hbg at baseline, likely on basis of CKD, check occult blood, iron studies, no indication for transfusion at this time. FA/MVI

## 2021-04-19 NOTE — ED PROVIDER NOTE - NS ED MD DISPO SPECIAL CONSIDERATION1
After Visit Summary             Chi Nowak   2017 2:30 PM   Office Visit    Description:  Female : 1956   Provider:  Ivett Farnsworth DO   Department:  [facility]              Your Follow-Up and Future Appointments         Below is a list of your follow-up and future appointments. This may not be a complete list as you may have made appointments directly with providers that we are not aware of or your providers may have made some for you. Please call your providers to confirm appointments. It is important to keep your appointments. Please bring your current insurance card, photo ID, co-pay, and all medication bottles to your appointment. If self-pay, payment is expected at the time of service. Your To-Do List     Future Orders Complete By Expires    ROBI Screening Bilateral [ Custom]  2017 (Approximate) 10/18/2018    ROBI Dexa Bone Density Scan [XVH3403 Custom]  2017 3/27/2018    ALT [DFY909 Custom]  2018    AST [LJG480 Custom]  2018/8231    Basic Metabolic Panel [TQJ50 Custom]  2018    Lipid Panel [LAB18 Custom]  2018    Follow-Up    Return in about 1 year (around 2018) for yearly preventative care appointment.          Information from Your Visit        Department     Name Address Phone Fax    [facility] [address] [address] [phone]      You Were Seen for:         Comments    Preventative health care   [854374]         Vital Signs     Blood Pressure Pulse Temperature Height Weight Breastfeeding?    110/78 (Site: Left Arm, Position: Sitting, Cuff Size: Medium Adult) 78 98 °F (36.7 °C) (Oral) 5' 6\" (1.676 m) 146 lb (66.2 kg) No    Body Mass Index Smoking Status                23.57 kg/m2 Current Every Day Smoker          Instructions    please call Dr. Miguel Walters office to see if / when you are due for follow up    Schedule colon scope Schedule dexa scan    Schedule mammogram    Have [facility] send me the shingles vacc date. Our fax is [phone]            Where to Get Your Medications      These medications were sent to [address] [facility], [address] [phone] Marcia Pinzon [phone]  [address] [place] [address]     Phone:  [phone]     atorvastatin 20 MG tablet         Your Current Medications Are              atorvastatin (LIPITOR) 20 MG tablet Take 1 tablet by mouth daily    loratadine (ALLERGY RELIEF) 10 MG tablet Take 10 mg by mouth daily    therapeutic multivitamin-minerals (THERAGRAN-M) tablet Take 1 tablet by mouth daily. aspirin 81 MG tablet Take 81 mg by mouth daily. Allergies           No Known Allergies      We Ordered/Performed the following           Mynor Diaz MD ([facility])     Scheduling Instructions:    Please call the office noted below to schedule an appointment: [facility] and Estephania Leger MD  [address] [place] [address], [address] [place] [address], [address] [place] [address] Drive  Phone: [phone]    Comments: The patient can be scheduled with any member of the group, including the provider with the first available appointments.           Additional Information        Basic Information     Date Of Birth Sex Race Ethnicity Preferred Language    1956 Female White Non-/Non  English      Problem List as of 9/28/2017  Date Reviewed: 5/5/2015                Colon polyps    Postmenopausal    Hypercholesterolemia    GERD (gastroesophageal reflux disease)    Migraine    Nicotine abuse    MVP (mitral valve prolapse)      Immunizations as of 9/28/2017     Name Date    Influenza Vaccine, unspecified formulation 9/20/2017    Influenza Virus Vaccine 9/26/2014    Tdap (Boostrix, Adacel) 8/27/2013      Preventive Care        Date Due    Pneumococcal Vaccine - Pneumovax for adults aged 19-64 years with: None

## 2021-04-19 NOTE — ED ADULT NURSE REASSESSMENT NOTE - NS ED NURSE REASSESS COMMENT FT1
Report received from outgoing RN Ruthann for continuity of care. Patient calm and cooperative, VS stable at this time, Vancomycin infusing well to PIV. Admission for sepsis, pending bed assignment and further antibiotic management for known UTI. Calm and cooperative. Safety maintained.

## 2021-04-19 NOTE — ED ADULT NURSE NOTE - OBJECTIVE STATEMENT
pt a+ox3, presents to ED c/o worsening weakness. EMS reports pt was in shower with home health aide, c/o sudden weakness and lowered self to ground. pt currently being treated for UTI and d/c'd from [facility]. hx of end stage cancer. code sepsis activated.

## 2021-04-19 NOTE — ED PROVIDER NOTE - PHYSICAL EXAMINATION
General: WN/WD NAD  Head: Atraumatic  ENT: moist mucous membranes  Neurology: A&Ox3, nonfocal, VERMA x 4  Respiratory: CTA B/L, normal respiratory effort, no wheezes, crackles, rales  CV: Tachycardic, reg rhythm, S1S2, no murmurs, rubs or gallops  Abdominal: Soft, NT, ND +BS  Extremities: No edema, + peripheral pulses  Incisions: none  Tubes: none

## 2021-04-19 NOTE — H&P ADULT - PROBLEM/PLAN-4
[de-identified] : Continue conservative management and care. RTO 4 weeks. \par \par \par Diagnosis, prognosis, natural history and treatment was discussed with patient. Patient was advised if the following symptoms develop: chills, fever, loss of bladder control, bladder incontinence or urinary retention, numbness/tingling or weakness is present in upper or lower extremities, to go to the nearest ER and to call the office immediately to inform us.\par \par 
DISPLAY PLAN FREE TEXT

## 2021-04-19 NOTE — H&P ADULT - NSICDXPASTMEDICALHX_GEN_ALL_CORE_FT
PAST MEDICAL HISTORY:  Chronic anemia baseilne Hbg 8-9    CKD (chronic kidney disease) stage 3, GFR 30-59 ml/min     Diabetes     HTN (hypertension)     Other secondary cataract of both eyes legally blind    Urinary retention chronic bourne

## 2021-04-19 NOTE — ED ADULT TRIAGE NOTE - CHIEF COMPLAINT QUOTE
as per EMS patient was getting showered by home health aide when experienced episode of weakness to bilateral lower extremities appx 2 hours ago. weakness has since resolved. VERMA with full strength upper and lower, no sensation loss, A&Ox4 answering all questions appropriately. legally blind, being treated for UTI since Saturday. code sepsis called from triage

## 2021-04-19 NOTE — ED ADULT NURSE REASSESSMENT NOTE - NS ED NURSE REASSESS COMMENT FT1
pt AOX3 in no apparent distress, denies any pain at the moment, report received from RICHARD CROWE pt AOX3 in no apparent distress, denies any pain at the moment, report received from RN Marisol MUHAMMAD. pt has leg bag, [place]. bag emptied

## 2021-04-19 NOTE — ED PROVIDER NOTE - NS ED ROS FT
Gen: normal appetite  Eyes: No eye irritation or discharge  ENT: No ear pain, congestion, sore throat  Resp: No cough or trouble breathing  Cardiovascular: No chest pain or palpitation  Gastroenteric: No nausea/vomiting, diarrhea, constipation  :  No change in urine output; no dysuria  MS: No joint or muscle pain  Skin: No rashes  Neuro: No headache; no abnormal movements  Remainder negative, except as per the HPI

## 2021-04-19 NOTE — ED PROVIDER NOTE - OBJECTIVE STATEMENT
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 Pt was recently diagnosed with UTI and started on keflex. Pt was in the shower today with help of his aid when he got weak and his legs gave out from under him and had to be helped to the ground. Pt drank some water and felt better. Aide noted that pt felt warm. Pt noted to be febrile and tachycardic in triage. Pt denies HA, chest pain, SOB, nausea, abdominal pain, diarrhea, constipation.

## 2021-04-19 NOTE — H&P ADULT - PROBLEM SELECTOR PROBLEM 4
Type 2 diabetes mellitus with stage 3 chronic kidney disease, without long-term current use of insulin, unspecified whether stage 3a or 3b CKD

## 2021-04-19 NOTE — H&P ADULT - HISTORY OF PRESENT ILLNESS
71 y/o male with history of urinary retention due to BPH with chronic indwelling bourne x 3 years, states last changed 3 days ago after it was leaking, HTN, HLD, legally blind, CKD-4 who lives at home with help from family for ADLs. He uses a walker at baseline. He was brought in for feeling generalized weakness earlier today and felt like he was going to pass out. Denies LOC, or falls. No CP, SOB, N/V. He was noted to have low grade temp at home and in ED was 100.6 orally. U/A off new bourne placed in ED with pyuria. No hypotension, no signs of new end organ damage. He was given Cefepime and Vancomycin. Currently feels back to baseline.

## 2021-04-19 NOTE — ED PROVIDER NOTE - ATTENDING CONTRIBUTION TO CARE
I personally saw the patient with the resident, and completed the key components of the history and physical exam. I then discussed the management plan with the resident.    69 y/o M with HTN, DM, BPH, legally blind, metastatic small bowel Ca not on chemo/XRT presents for weakness, near syncope, was dx with UTI on 4/17 and started on Keflex. Noted to be febrile and tachycardic in triage, Code Sepsis called. 30 cc/kg fluid bolus ordered based on IBW, Vanc/Cefepime - urine likely source - will require admission for outpatient ABx failure.  AP - well appearing, tachycardic, diminished breath sounds b/l bases, abd soft, mildly distended, non-distended, no CVAT.    Interpretation of results, consultation with other physicians.

## 2021-04-19 NOTE — H&P ADULT - PROBLEM SELECTOR PLAN 1
Patient with likely contaminated bourne when changed last week. Catheter changed in ED as per Nursing Staff, U/A reflective of new catheter showing active infection. No other obvious source identified. Prior cx with e coli sensitive to Rocephin. No flank pain or concern for Pyelonephritis or stone. Cont. Rocephin pending new cultures. Patient with recurrent Catheter associated UTIs may benefit from SP catheter in future. Does not want to take any medications long term. Will start on Flomax and encouraged to take and attempt TOV in future as O/P with Urology. Add Florastor, trend Temp/WBC, MAP/SBP above goal, Lactate normal. OOB, Ambulate with cane, fall risk, DVT-P, PT program

## 2021-04-19 NOTE — ED PROVIDER NOTE - PROGRESS NOTE DETAILS
Chelsi MÁRQUEZ PGY-5:  Evidence of persistent UTI. No white count. sCr around baseline. CXR without focality.

## 2021-04-20 LAB
ANION GAP SERPL CALC-SCNC: 12 MMOL/L — SIGNIFICANT CHANGE UP (ref 5–17)
BASOPHILS # BLD AUTO: 0.03 K/UL — SIGNIFICANT CHANGE UP (ref 0–0.2)
BASOPHILS NFR BLD AUTO: 0.3 % — SIGNIFICANT CHANGE UP (ref 0–2)
BUN SERPL-MCNC: 20 MG/DL — SIGNIFICANT CHANGE UP (ref 8–20)
CALCIUM SERPL-MCNC: 8.5 MG/DL — LOW (ref 8.6–10.2)
CHLORIDE SERPL-SCNC: 101 MMOL/L — SIGNIFICANT CHANGE UP (ref 98–107)
CK SERPL-CCNC: 129 U/L — SIGNIFICANT CHANGE UP (ref 30–200)
CO2 SERPL-SCNC: 22 MMOL/L — SIGNIFICANT CHANGE UP (ref 22–29)
COVID-19 SPIKE DOMAIN AB INTERP: NEGATIVE — SIGNIFICANT CHANGE UP
COVID-19 SPIKE DOMAIN ANTIBODY RESULT: 0.4 U/ML — SIGNIFICANT CHANGE UP
CREAT SERPL-MCNC: 1.67 MG/DL — HIGH (ref 0.5–1.3)
EOSINOPHIL # BLD AUTO: 0.04 K/UL — SIGNIFICANT CHANGE UP (ref 0–0.5)
EOSINOPHIL NFR BLD AUTO: 0.4 % — SIGNIFICANT CHANGE UP (ref 0–6)
FERRITIN SERPL-MCNC: 73 NG/ML — SIGNIFICANT CHANGE UP (ref 30–400)
GLUCOSE BLDC GLUCOMTR-MCNC: 176 MG/DL — HIGH (ref 70–99)
GLUCOSE SERPL-MCNC: 183 MG/DL — HIGH (ref 70–99)
HCT VFR BLD CALC: 24.4 % — LOW (ref 39–50)
HCV AB S/CO SERPL IA: 0.14 S/CO — SIGNIFICANT CHANGE UP (ref 0–0.99)
HCV AB SERPL-IMP: SIGNIFICANT CHANGE UP
HGB BLD-MCNC: 7.5 G/DL — LOW (ref 13–17)
IMM GRANULOCYTES NFR BLD AUTO: 0.4 % — SIGNIFICANT CHANGE UP (ref 0–1.5)
IRON SATN MFR SERPL: 11 UG/DL — LOW (ref 59–158)
IRON SATN MFR SERPL: 4 % — LOW (ref 16–55)
LYMPHOCYTES # BLD AUTO: 0.66 K/UL — LOW (ref 1–3.3)
LYMPHOCYTES # BLD AUTO: 7.3 % — LOW (ref 13–44)
MAGNESIUM SERPL-MCNC: 2.1 MG/DL — SIGNIFICANT CHANGE UP (ref 1.6–2.6)
MCHC RBC-ENTMCNC: 20.3 PG — LOW (ref 27–34)
MCHC RBC-ENTMCNC: 30.7 GM/DL — LOW (ref 32–36)
MCV RBC AUTO: 65.9 FL — LOW (ref 80–100)
MONOCYTES # BLD AUTO: 1.43 K/UL — HIGH (ref 0–0.9)
MONOCYTES NFR BLD AUTO: 15.9 % — HIGH (ref 2–14)
NEUTROPHILS # BLD AUTO: 6.79 K/UL — SIGNIFICANT CHANGE UP (ref 1.8–7.4)
NEUTROPHILS NFR BLD AUTO: 75.7 % — SIGNIFICANT CHANGE UP (ref 43–77)
PHOSPHATE SERPL-MCNC: 2.4 MG/DL — SIGNIFICANT CHANGE UP (ref 2.4–4.7)
PLATELET # BLD AUTO: 266 K/UL — SIGNIFICANT CHANGE UP (ref 150–400)
POTASSIUM SERPL-MCNC: 4.3 MMOL/L — SIGNIFICANT CHANGE UP (ref 3.5–5.3)
POTASSIUM SERPL-SCNC: 4.3 MMOL/L — SIGNIFICANT CHANGE UP (ref 3.5–5.3)
RBC # BLD: 3.7 M/UL — LOW (ref 4.2–5.8)
RBC # BLD: 3.7 M/UL — LOW (ref 4.2–5.8)
RBC # FLD: 20.4 % — HIGH (ref 10.3–14.5)
RETICS #: 30.3 K/UL — SIGNIFICANT CHANGE UP (ref 25–125)
RETICS/RBC NFR: 0.8 % — SIGNIFICANT CHANGE UP (ref 0.5–2.5)
SARS-COV-2 IGG+IGM SERPL QL IA: 0.4 U/ML — SIGNIFICANT CHANGE UP
SARS-COV-2 IGG+IGM SERPL QL IA: NEGATIVE — SIGNIFICANT CHANGE UP
SODIUM SERPL-SCNC: 135 MMOL/L — SIGNIFICANT CHANGE UP (ref 135–145)
TIBC SERPL-MCNC: 300 UG/DL — SIGNIFICANT CHANGE UP (ref 220–430)
TRANSFERRIN SERPL-MCNC: 210 MG/DL — SIGNIFICANT CHANGE UP (ref 180–329)
WBC # BLD: 8.99 K/UL — SIGNIFICANT CHANGE UP (ref 3.8–10.5)
WBC # FLD AUTO: 8.99 K/UL — SIGNIFICANT CHANGE UP (ref 3.8–10.5)

## 2021-04-20 PROCEDURE — 99233 SBSQ HOSP IP/OBS HIGH 50: CPT

## 2021-04-20 RX ORDER — DEXTROSE 50 % IN WATER 50 %
25 SYRINGE (ML) INTRAVENOUS ONCE
Refills: 0 | Status: DISCONTINUED | OUTPATIENT
Start: 2021-04-20 | End: 2021-05-01

## 2021-04-20 RX ORDER — SODIUM CHLORIDE 9 MG/ML
1000 INJECTION, SOLUTION INTRAVENOUS
Refills: 0 | Status: DISCONTINUED | OUTPATIENT
Start: 2021-04-20 | End: 2021-05-01

## 2021-04-20 RX ORDER — INSULIN LISPRO 100/ML
VIAL (ML) SUBCUTANEOUS AT BEDTIME
Refills: 0 | Status: DISCONTINUED | OUTPATIENT
Start: 2021-04-20 | End: 2021-05-01

## 2021-04-20 RX ORDER — INSULIN LISPRO 100/ML
VIAL (ML) SUBCUTANEOUS
Refills: 0 | Status: DISCONTINUED | OUTPATIENT
Start: 2021-04-20 | End: 2021-05-01

## 2021-04-20 RX ORDER — DEXTROSE 50 % IN WATER 50 %
12.5 SYRINGE (ML) INTRAVENOUS ONCE
Refills: 0 | Status: DISCONTINUED | OUTPATIENT
Start: 2021-04-20 | End: 2021-05-01

## 2021-04-20 RX ORDER — GLUCAGON INJECTION, SOLUTION 0.5 MG/.1ML
1 INJECTION, SOLUTION SUBCUTANEOUS ONCE
Refills: 0 | Status: DISCONTINUED | OUTPATIENT
Start: 2021-04-20 | End: 2021-05-01

## 2021-04-20 RX ORDER — DEXTROSE 50 % IN WATER 50 %
15 SYRINGE (ML) INTRAVENOUS ONCE
Refills: 0 | Status: DISCONTINUED | OUTPATIENT
Start: 2021-04-20 | End: 2021-05-01

## 2021-04-20 RX ADMIN — SODIUM CHLORIDE 3 MILLILITER(S): 9 INJECTION INTRAMUSCULAR; INTRAVENOUS; SUBCUTANEOUS at 16:54

## 2021-04-20 RX ADMIN — Medication 1 MILLIGRAM(S): at 12:31

## 2021-04-20 RX ADMIN — Medication 250 MILLIGRAM(S): at 17:15

## 2021-04-20 RX ADMIN — Medication 1 TABLET(S): at 12:30

## 2021-04-20 RX ADMIN — SODIUM CHLORIDE 3 MILLILITER(S): 9 INJECTION INTRAMUSCULAR; INTRAVENOUS; SUBCUTANEOUS at 22:23

## 2021-04-20 RX ADMIN — Medication 650 MILLIGRAM(S): at 17:15

## 2021-04-20 RX ADMIN — Medication 25 MILLIGRAM(S): at 17:15

## 2021-04-20 RX ADMIN — Medication 2: at 17:16

## 2021-04-20 RX ADMIN — SODIUM CHLORIDE 3 MILLILITER(S): 9 INJECTION INTRAMUSCULAR; INTRAVENOUS; SUBCUTANEOUS at 05:36

## 2021-04-20 RX ADMIN — HEPARIN SODIUM 5000 UNIT(S): 5000 INJECTION INTRAVENOUS; SUBCUTANEOUS at 17:18

## 2021-04-20 NOTE — PROGRESS NOTE ADULT - SUBJECTIVE AND OBJECTIVE BOX
69 y/o male with history of urinary retention due to BPH with chronic indwelling bourne x 3 years, states last changed 3 days ago after it was leaking, HTN, HLD, legally blind, metastatic bowel ca (not on chemo/radiation) CKD-4 who lives at home with help from family for ADLs. He uses a walker at baseline. He was brought in by EMS with generalized weakness and felt like he was going to pass out. Denies LOC, or falls. No CP, SOB, N/V. He was noted to have low grade temp at home and in ED was 100.6 orally. New bourne was placed in ED with pyuria.     Patient admitted to medicine for further management of complicated UTI. Urine cx and blood cultures x2 pending. Patient received dose of Cefepime and Vanco in ER. Patient will continue with Rocephin IV antibiotics pending culture results.      INTERVAL HPI/OVERNIGHT EVENTS:  No overnight events  No patient issues     - Upon assessment patient appears to be fatigued, but arousable and appropriate to situation. Patient denies chest pain, palpitations, SOB, abdominal pain, or dysuria. When asked if patient takes any medications at home, patient denied. Will follow up with daughter. Patient stated he would like to call his urologist Dr. Carr [phone]     Vital Signs Last 24 Hrs  T(C): 37.7 (2021 09:20), Max: 38.1 (2021 15:51)  T(F): 99.8 (2021 09:20), Max: 100.6 (2021 15:51)  HR: 100 (2021 09:20) (76 - 105)  BP: 123/68 (2021 09:20) (123/68 - 151/78)  BP(mean): 97 (2021 23:19) (97 - 97)  RR: 18 (2021 09:20) (18 - 22)  SpO2: 99% (2021 09:20) (96% - 99%)    PHYSICAL EXAM:  General: Well developed; well nourished; in no acute distress, generalized fatigue   Eyes: +cataracts, patient legally blind  Head: Normocephalic; atraumatic  Respiratory: No wheezes, rales or rhonchi  Cardiovascular: Regular rate and rhythm. S1 and S2 Normal; No murmurs, gallops or rubs  Gastrointestinal: Soft non-tender non-distended; Normal bowel sounds  Genitourinary: + Left sided costovertebral angle tenderness present   Extremities: Normal range of motion, No clubbing, cyanosis or edema  Vascular: Peripheral pulses palpable 2+ bilaterally  Neurological: Alert and oriented x4  Skin: Warm and dry. No acute rash  Lymph Nodes: No acute cervical adenopathy  Musculoskeletal: No musculoskeletal deformities noted. Uses walker/cane at baseline   Psychiatric: Cooperative and appropriate    I&O's Detail    2021 07:01  -  2021 07:00  --------------------------------------------------------  IN:  Total IN: 0 mL    OUT:    Voided (mL): 1725 mL  Total OUT: 1725 mL    Total NET: -1725 mL                                    7.5    8.99  )-----------( 266      ( 2021 06:54 )             24.4     2021 06:54    135    |  101    |  20.0   ----------------------------<  183    4.3     |  22.0   |  1.67     Ca    8.5        2021 06:54  Phos  2.4       2021 06:54  Mg     2.1       2021 06:54    TPro  7.9    /  Alb  3.8    /  TBili  0.9    /  DBili  x      /  AST  39     /  ALT  14     /  AlkPhos  82     2021 16:24    PT/INR - ( 2021 16:24 )   PT: 15.6 sec;   INR: 1.36 ratio         PTT - ( 2021 16:24 )  PTT:28.2 sec  CAPILLARY BLOOD GLUCOSE        LIVER FUNCTIONS - ( 2021 16:24 )  Alb: 3.8 g/dL / Pro: 7.9 g/dL / ALK PHOS: 82 U/L / ALT: 14 U/L / AST: 39 U/L / GGT: x           Urinalysis Basic - ( 2021 19:15 )    Color: Yellow / Appearance: Slightly Turbid / S.010 / pH: x  Gluc: x / Ketone: Negative  / Bili: Negative / Urobili: Negative mg/dL   Blood: x / Protein: 100 mg/dL / Nitrite: Negative   Leuk Esterase: Moderate / RBC: 0-2 /HPF / WBC >50   Sq Epi: x / Non Sq Epi: Occasional / Bacteria: Occasional        MEDICATIONS  (STANDING):  cefTRIAXone   IVPB 1000 milliGRAM(s) IV Intermittent every 24 hours  folic acid 1 milliGRAM(s) Oral daily  heparin   Injectable 5000 Unit(s) SubCutaneous every 12 hours  metoprolol tartrate 25 milliGRAM(s) Oral two times a day  multivitamin 1 Tablet(s) Oral daily  pantoprazole    Tablet 40 milliGRAM(s) Oral before breakfast  saccharomyces boulardii 250 milliGRAM(s) Oral two times a day  sodium bicarbonate 650 milliGRAM(s) Oral two times a day  sodium chloride 0.9% lock flush 3 milliLiter(s) IV Push every 8 hours  tamsulosin 0.8 milliGRAM(s) Oral at bedtime    MEDICATIONS  (PRN):  ondansetron Injectable 4 milliGRAM(s) IV Push every 6 hours PRN Nausea      RADIOLOGY & ADDITIONAL TESTS:  Xray Chest 1 View- PORTABLE-Urgent (21 @ 17:34)   IMPRESSION: No acute cardiopulmonary disease process. Cardiomegaly.       71 y/o male with history of urinary retention due to BPH with chronic indwelling bourne x 3 years, states last changed 3 days ago after it was leaking, HTN, HLD, legally blind, metastatic bowel ca (not on chemo/radiation) CKD-4 who lives at home with help from family for ADLs. He uses a walker at baseline. He was brought in by EMS with generalized weakness and felt like he was going to pass out. Denies LOC, or falls. No CP, SOB, N/V. He was noted to have low grade temp at home and in ED was 100.6 orally. New bourne was placed in ED with pyuria.     Patient admitted to medicine for further management of complicated UTI. Urine cx and blood cultures x2 pending. Patient received dose of Cefepime and Vanco in ER. Patient will continue with Rocephin IV antibiotics pending culture results.      INTERVAL HPI/OVERNIGHT EVENTS:  No overnight events  No patient issues     - Upon assessment patient appears to be fatigued, but arousable and appropriate to situation. Patient denies chest pain, palpitations, SOB, abdominal pain, or dysuria. When asked if patient takes any medications at home, patient denied. Will follow up with daughter. Patient stated he would like to call his urologist Dr. Carr [phone]     Vital Signs Last 24 Hrs  T(C): 37.7 (2021 09:20), Max: 38.1 (2021 15:51)  T(F): 99.8 (2021 09:20), Max: 100.6 (2021 15:51)  HR: 100 (2021 09:20) (76 - 105)  BP: 123/68 (2021 09:20) (123/68 - 151/78)  BP(mean): 97 (2021 23:19) (97 - 97)  RR: 18 (2021 09:20) (18 - 22)  SpO2: 99% (2021 09:20) (96% - 99%)    PHYSICAL EXAM:  General: Well developed; well nourished; in no acute distress, generalized fatigue   Eyes: +cataracts, patient legally blind  Head: Normocephalic; atraumatic  Respiratory: No wheezes, rales or rhonchi. Patient on room air   Cardiovascular: Regular rate and rhythm. S1 and S2 Normal; No murmurs, gallops or rubs  Gastrointestinal: Soft non-tender non-distended; Normal bowel sounds  Genitourinary: + Left sided costovertebral angle tenderness present   Extremities: Normal range of motion, No clubbing, cyanosis or edema  Vascular: Peripheral pulses palpable 2+ bilaterally  Neurological: Alert and oriented x4  Skin: Warm and dry. No acute rash  Lymph Nodes: No acute cervical adenopathy  Musculoskeletal: No musculoskeletal deformities noted. Uses walker/cane at baseline   Psychiatric: Cooperative and appropriate    I&O's Detail    2021 07:01  -  2021 07:00  --------------------------------------------------------  IN:  Total IN: 0 mL    OUT:    Voided (mL): 1725 mL  Total OUT: 1725 mL    Total NET: -1725 mL                                    7.5    8.99  )-----------( 266      ( 2021 06:54 )             24.4     2021 06:54    135    |  101    |  20.0   ----------------------------<  183    4.3     |  22.0   |  1.67     Ca    8.5        2021 06:54  Phos  2.4       2021 06:54  Mg     2.1       2021 06:54    TPro  7.9    /  Alb  3.8    /  TBili  0.9    /  DBili  x      /  AST  39     /  ALT  14     /  AlkPhos  82     2021 16:24    PT/INR - ( 2021 16:24 )   PT: 15.6 sec;   INR: 1.36 ratio         PTT - ( 2021 16:24 )  PTT:28.2 sec  CAPILLARY BLOOD GLUCOSE        LIVER FUNCTIONS - ( 2021 16:24 )  Alb: 3.8 g/dL / Pro: 7.9 g/dL / ALK PHOS: 82 U/L / ALT: 14 U/L / AST: 39 U/L / GGT: x           Urinalysis Basic - ( 2021 19:15 )    Color: Yellow / Appearance: Slightly Turbid / S.010 / pH: x  Gluc: x / Ketone: Negative  / Bili: Negative / Urobili: Negative mg/dL   Blood: x / Protein: 100 mg/dL / Nitrite: Negative   Leuk Esterase: Moderate / RBC: 0-2 /HPF / WBC >50   Sq Epi: x / Non Sq Epi: Occasional / Bacteria: Occasional        MEDICATIONS  (STANDING):  cefTRIAXone   IVPB 1000 milliGRAM(s) IV Intermittent every 24 hours  folic acid 1 milliGRAM(s) Oral daily  heparin   Injectable 5000 Unit(s) SubCutaneous every 12 hours  metoprolol tartrate 25 milliGRAM(s) Oral two times a day  multivitamin 1 Tablet(s) Oral daily  pantoprazole    Tablet 40 milliGRAM(s) Oral before breakfast  saccharomyces boulardii 250 milliGRAM(s) Oral two times a day  sodium bicarbonate 650 milliGRAM(s) Oral two times a day  sodium chloride 0.9% lock flush 3 milliLiter(s) IV Push every 8 hours  tamsulosin 0.8 milliGRAM(s) Oral at bedtime    MEDICATIONS  (PRN):  ondansetron Injectable 4 milliGRAM(s) IV Push every 6 hours PRN Nausea      RADIOLOGY & ADDITIONAL TESTS:  Xray Chest 1 View- PORTABLE-Urgent (21 @ 17:34)   IMPRESSION: No acute cardiopulmonary disease process. Cardiomegaly.       69 y/o male with history of urinary retention due to BPH with chronic indwelling bourne x 3 years, states last changed 3 days ago after it was leaking, HTN, HLD, legally blind, metastatic bowel ca (not on chemo/radiation) CKD-4 who lives at home with help from family for ADLs. He uses a walker at baseline. He was brought in by EMS with generalized weakness and felt like he was going to pass out. Denies LOC, or falls. No CP, SOB, N/V. He was noted to have low grade temp at home and in ED was 100.6 orally. New bourne was placed in ED with pyuria.     Patient admitted to medicine for further management of complicated UTI. Urine cx and blood cultures x2 pending. Patient received dose of Cefepime and Vanco in ER. Patient will continue with Rocephin IV antibiotics pending culture results.      INTERVAL HPI/OVERNIGHT EVENTS:  No overnight events  No patient issues     - Upon assessment patient appears to be fatigued, but arousable and appropriate to situation. Patient denies chest pain, palpitations, SOB, abdominal pain, or dysuria. When asked if patient takes any medications at home, patient denied. Will follow up with daughter.     Spoke with daughter Amelia with updated on patient status. As per daughter patient refuses to take any medications at home. Additional information was provided by the daughter including patient's PCP is Dr. Whitney Jones (in [place]). Patient's nephrologist is Dr. Carr. Daughter also gave additional background information regarding metastatic small bowel diagnosis - was diagnosed at [facility], patient refused to undergo chemo/radiation treatment. Patient's daughter also expressed concerns regarding discharge, requesting services/assistance at home.       Vital Signs Last 24 Hrs  T(C): 37.7 (2021 09:20), Max: 38.1 (2021 15:51)  T(F): 99.8 (2021 09:20), Max: 100.6 (2021 15:51)  HR: 100 (2021 09:20) (76 - 105)  BP: 123/68 (2021 09:20) (123/68 - 151/78)  BP(mean): 97 (2021 23:19) (97 - 97)  RR: 18 (2021 09:20) (18 - 22)  SpO2: 99% (2021 09:20) (96% - 99%)    PHYSICAL EXAM:  General: Well developed; well nourished; in no acute distress, generalized fatigue   Eyes: +cataracts, patient legally blind  Head: Normocephalic; atraumatic  Respiratory: No wheezes, rales or rhonchi. Patient on room air   Cardiovascular: Regular rate and rhythm. S1 and S2 Normal; No murmurs, gallops or rubs  Gastrointestinal: Soft non-tender non-distended; Normal bowel sounds  Genitourinary: No costovertebral angle tenderness   Extremities: Normal range of motion, No clubbing, cyanosis or edema  Vascular: Peripheral pulses palpable 2+ bilaterally, Mild +1 b/l lower extremity edema.   Neurological: Alert and oriented x4  Skin: Warm and dry. No acute rash  Lymph Nodes: No acute cervical adenopathy  Musculoskeletal: No musculoskeletal deformities noted. Uses cane at baseline   Psychiatric: Cooperative and appropriate      I&O's Detail    2021 07:01  -  2021 07:00  --------------------------------------------------------  IN:  Total IN: 0 mL    OUT:    Voided (mL): 1725 mL  Total OUT: 1725 mL    Total NET: -1725 mL                                    7.5    8.99  )-----------( 266      ( 2021 06:54 )             24.4     2021 06:54    135    |  101    |  20.0   ----------------------------<  183    4.3     |  22.0   |  1.67     Ca    8.5        2021 06:54  Phos  2.4       2021 06:54  Mg     2.1       2021 06:54    TPro  7.9    /  Alb  3.8    /  TBili  0.9    /  DBili  x      /  AST  39     /  ALT  14     /  AlkPhos  82     2021 16:24    PT/INR - ( 2021 16:24 )   PT: 15.6 sec;   INR: 1.36 ratio         PTT - ( 2021 16:24 )  PTT:28.2 sec  CAPILLARY BLOOD GLUCOSE        LIVER FUNCTIONS - ( 2021 16:24 )  Alb: 3.8 g/dL / Pro: 7.9 g/dL / ALK PHOS: 82 U/L / ALT: 14 U/L / AST: 39 U/L / GGT: x           Urinalysis Basic - ( 2021 19:15 )    Color: Yellow / Appearance: Slightly Turbid / S.010 / pH: x  Gluc: x / Ketone: Negative  / Bili: Negative / Urobili: Negative mg/dL   Blood: x / Protein: 100 mg/dL / Nitrite: Negative   Leuk Esterase: Moderate / RBC: 0-2 /HPF / WBC >50   Sq Epi: x / Non Sq Epi: Occasional / Bacteria: Occasional        MEDICATIONS  (STANDING):  cefTRIAXone   IVPB 1000 milliGRAM(s) IV Intermittent every 24 hours  folic acid 1 milliGRAM(s) Oral daily  heparin   Injectable 5000 Unit(s) SubCutaneous every 12 hours  metoprolol tartrate 25 milliGRAM(s) Oral two times a day  multivitamin 1 Tablet(s) Oral daily  pantoprazole    Tablet 40 milliGRAM(s) Oral before breakfast  saccharomyces boulardii 250 milliGRAM(s) Oral two times a day  sodium bicarbonate 650 milliGRAM(s) Oral two times a day  sodium chloride 0.9% lock flush 3 milliLiter(s) IV Push every 8 hours  tamsulosin 0.8 milliGRAM(s) Oral at bedtime    MEDICATIONS  (PRN):  ondansetron Injectable 4 milliGRAM(s) IV Push every 6 hours PRN Nausea      RADIOLOGY & ADDITIONAL TESTS:  Xray Chest 1 View- PORTABLE-Urgent (21 @ 17:34)   IMPRESSION: No acute cardiopulmonary disease process. Cardiomegaly.       71 y/o male with history of urinary retention due to BPH with chronic indwelling bourne x 3 years, states last changed 3 days ago after it was leaking, HTN, HLD, legally blind, metastatic bowel ca (not on chemo/radiation) CKD-4 who lives at home with help from family for ADLs. He uses a walker at baseline. He was brought in by EMS with generalized weakness and felt like he was going to pass out. Denies LOC, or falls. No CP, SOB, N/V. He was noted to have low grade temp at home and in ED was 100.6 orally. New bourne was placed in ED with pyuria.     Patient admitted to medicine for further management of complicated UTI. Urine cx and blood cultures x2 pending. Patient received dose of Cefepime and Vanco in ER. Patient will continue with Rocephin IV antibiotics pending culture results.      INTERVAL HPI/OVERNIGHT EVENTS:  No overnight events  No patient issues     - Upon assessment patient appears to be fatigued, but arousable and appropriate to situation. Patient denies chest pain, palpitations, SOB, abdominal pain, or dysuria. When asked if patient takes any medications at home, patient denied. Will follow up with daughter.     Spoke with daughter Amelia to update on patient status. As per daughter patient refuses to take any medications at home. Additional information was provided by the daughter including patient's PCP is Dr. Whitney Jones (in [place]). Patient's nephrologist is Dr. Carr. Daughter also gave additional background information regarding metastatic small bowel diagnosis - was diagnosed at [facility], patient refused to undergo chemo/radiation treatment. Patient's daughter also expressed concerns regarding discharge, requesting services/assistance at home.       Vital Signs Last 24 Hrs  T(C): 37.7 (2021 09:20), Max: 38.1 (2021 15:51)  T(F): 99.8 (2021 09:20), Max: 100.6 (2021 15:51)  HR: 100 (2021 09:20) (76 - 105)  BP: 123/68 (2021 09:20) (123/68 - 151/78)  BP(mean): 97 (2021 23:19) (97 - 97)  RR: 18 (2021 09:20) (18 - 22)  SpO2: 99% (2021 09:20) (96% - 99%)    PHYSICAL EXAM:  General: Well developed; well nourished; in no acute distress, generalized fatigue   Eyes: +cataracts, patient legally blind  Head: Normocephalic; atraumatic  Respiratory: No wheezes, rales or rhonchi. Patient on room air   Cardiovascular: Regular rate and rhythm. S1 and S2 Normal; No murmurs, gallops or rubs  Gastrointestinal: Soft non-tender non-distended; Normal bowel sounds  Genitourinary: No costovertebral angle tenderness   Extremities: Normal range of motion, No clubbing, cyanosis or edema  Vascular: Peripheral pulses palpable 2+ bilaterally, Mild +1 b/l lower extremity edema.   Neurological: Alert and oriented x4  Skin: Warm and dry. No acute rash  Lymph Nodes: No acute cervical adenopathy  Musculoskeletal: No musculoskeletal deformities noted. Uses cane at baseline   Psychiatric: Cooperative and appropriate      I&O's Detail    2021 07:01  -  2021 07:00  --------------------------------------------------------  IN:  Total IN: 0 mL    OUT:    Voided (mL): 1725 mL  Total OUT: 1725 mL    Total NET: -1725 mL                                    7.5    8.99  )-----------( 266      ( 2021 06:54 )             24.4     2021 06:54    135    |  101    |  20.0   ----------------------------<  183    4.3     |  22.0   |  1.67     Ca    8.5        2021 06:54  Phos  2.4       2021 06:54  Mg     2.1       2021 06:54    TPro  7.9    /  Alb  3.8    /  TBili  0.9    /  DBili  x      /  AST  39     /  ALT  14     /  AlkPhos  82     2021 16:24    PT/INR - ( 2021 16:24 )   PT: 15.6 sec;   INR: 1.36 ratio         PTT - ( 2021 16:24 )  PTT:28.2 sec  CAPILLARY BLOOD GLUCOSE        LIVER FUNCTIONS - ( 2021 16:24 )  Alb: 3.8 g/dL / Pro: 7.9 g/dL / ALK PHOS: 82 U/L / ALT: 14 U/L / AST: 39 U/L / GGT: x           Urinalysis Basic - ( 2021 19:15 )    Color: Yellow / Appearance: Slightly Turbid / S.010 / pH: x  Gluc: x / Ketone: Negative  / Bili: Negative / Urobili: Negative mg/dL   Blood: x / Protein: 100 mg/dL / Nitrite: Negative   Leuk Esterase: Moderate / RBC: 0-2 /HPF / WBC >50   Sq Epi: x / Non Sq Epi: Occasional / Bacteria: Occasional        MEDICATIONS  (STANDING):  cefTRIAXone   IVPB 1000 milliGRAM(s) IV Intermittent every 24 hours  folic acid 1 milliGRAM(s) Oral daily  heparin   Injectable 5000 Unit(s) SubCutaneous every 12 hours  metoprolol tartrate 25 milliGRAM(s) Oral two times a day  multivitamin 1 Tablet(s) Oral daily  pantoprazole    Tablet 40 milliGRAM(s) Oral before breakfast  saccharomyces boulardii 250 milliGRAM(s) Oral two times a day  sodium bicarbonate 650 milliGRAM(s) Oral two times a day  sodium chloride 0.9% lock flush 3 milliLiter(s) IV Push every 8 hours  tamsulosin 0.8 milliGRAM(s) Oral at bedtime    MEDICATIONS  (PRN):  ondansetron Injectable 4 milliGRAM(s) IV Push every 6 hours PRN Nausea      RADIOLOGY & ADDITIONAL TESTS:  Xray Chest 1 View- PORTABLE-Urgent (21 @ 17:34)   IMPRESSION: No acute cardiopulmonary disease process. Cardiomegaly.

## 2021-04-20 NOTE — PROGRESS NOTE ADULT - ASSESSMENT
69 y/o M with PMHx urinary retention due to BPH requiring chronic bourne for past 3 years, chronic anemia, CKD Stage III, DM II, HTN, metastatic SB ca (not on chemo/rad), b/l cataracts legally blind presents to ED with generalized weakness, low grade temperature at home, with recent outpatient diagnosis of UTI that was being treated with Keflex.     #Catheter associated UTI  -Chronic bourne was replaced in ED - pyuria on placement  -In ED received Cefepime and Vanco IV  -Will continue with Rocephin IV for now  -Pending urine cx and blood cx x2  -Generalized fatigue likely related to infection     #Essential HTN  -BP has been controlled during hospital stay  -Metoprolol 25mg oral 2x daily    # DM II  -Will start appropriate sliding scale  -Glucose 183 from AM labs   -A1c     #CKD III  -Baseline GFR 30-59  -BUN/Cr - 20/1.67  -Bourne in place   -Continue to monitor I&Os    #Chronic anemia  -Baseline hemoglobin 8-9  -7.5/24.4 from this AM  -Check CBC in AM to trend if further decrease in H&H  -Patient hemodynamically stable    #Metastatic Bowel Ca   -Patient is not on chemo/radiation    #Legally Blind   -b/l cataracts   -uses walker/cane at baseline  -Lives at home with daughter     #DVT ppx: Heparin 5000U SQ q12  #GI ppx: Protonix 40mg oral before breakfast  #Diet: DASH/TLC   #Code Status: Full Code  #Disposition: Admit to medicine for further management of complicated UTI        71 y/o M with PMHx urinary retention due to BPH requiring chronic bourne for past 3 years, chronic anemia, CKD Stage III, DM II, HTN, metastatic SB ca (not on chemo/rad), b/l cataracts legally blind presents to ED with generalized weakness, low grade temperature at home, with recent outpatient diagnosis of UTI that was being treated with Keflex.     #Catheter associated UTI  -Chronic bourne was replaced in ED - pyuria on placement  -In ED received Cefepime and Vanco IV  -Will continue with Rocephin IV for now  -Pending urine cx and blood cx x2  -Generalized fatigue likely related to infection   -WBC 8.99  -T-37.7 - TMax 38.1 from 4/19    #Essential HTN  -BP has been controlled during hospital stay  -Metoprolol 25mg oral 2x daily    # DM II  -Will start appropriate sliding scale  -Glucose 183 from AM labs   -A1c     #CKD III  -Baseline GFR 30-59  -BUN/Cr - 20/1.67  -Bourne in place   -Continue to monitor I&Os    #Chronic anemia  -Baseline hemoglobin 8-9  -7.5/24.4 from this AM  -Check CBC in AM to trend if further decrease in H&H  -Patient hemodynamically stable    #Metastatic Bowel Ca   -Patient is not on chemo/radiation    #Legally Blind   -b/l cataracts   -uses walker/cane at baseline  -Lives at home with daughter     #DVT ppx: Heparin 5000U SQ q12  #GI ppx: Protonix 40mg oral before breakfast  #Diet: DASH/TLC   #Code Status: Full Code  #Disposition: Admit to medicine for further management of complicated UTI        69 y/o M with PMHx urinary retention due to BPH requiring chronic bourne for past 3 years, chronic anemia, CKD Stage III, DM II, HTN, metastatic SB ca (not on chemo/rad), b/l cataracts legally blind presents to ED with generalized weakness, low grade temperature at home, with recent outpatient diagnosis of UTI that was being treated with Keflex.     #Catheter associated UTI  -Chronic bourne was replaced in ED - pyuria on placement  -In ED received Cefepime and Vanco IV  -Will continue with Rocephin IV for now  -Pending urine cx and blood cx x2  -Generalized fatigue likely related to infection   -WBC 8.99  -T-37.7 - TMax 38.1 from 4/19    #Fatigue/Weakness - Likely related to infection  -Will check TSH  -Will check CPK     #Essential HTN  -BP has been controlled during hospital stay  -Metoprolol 25mg oral 2x daily    # DM II  -Will start appropriate sliding scale  -Glucose 183 from AM labs   -Will check A1c     #CKD III  -Baseline GFR 30-59  -BUN/Cr - 20/1.67  -Bourne in place   -Continue to monitor I&Os    #Chronic anemia  -Baseline hemoglobin 8-9  -7.5/24.4 from this AM  -Check CBC in AM to trend if further decrease in H&H  -Patient hemodynamically stable    #Metastatic Bowel Ca   -As per conversation with daughter patient was diagnosed at [facility] with metastatic small bowel Ca. Patient refused to undergo chemo/radiation treatment.     #Legally Blind   -b/l cataracts   -uses cane at baseline  -Lives at home with daughter Amelia     #DVT ppx: Heparin 5000U SQ q12  #GI ppx: Protonix 40mg oral before breakfast  #Diet: DASH/TLC   #Code Status: Full Code  #Disposition: Admit to medicine for further management of complicated UTI

## 2021-04-21 LAB
A1C WITH ESTIMATED AVERAGE GLUCOSE RESULT: 7.4 % — HIGH (ref 4–5.6)
ANION GAP SERPL CALC-SCNC: 13 MMOL/L — SIGNIFICANT CHANGE UP (ref 5–17)
BUN SERPL-MCNC: 25 MG/DL — HIGH (ref 8–20)
CALCIUM SERPL-MCNC: 8.9 MG/DL — SIGNIFICANT CHANGE UP (ref 8.6–10.2)
CHLORIDE SERPL-SCNC: 98 MMOL/L — SIGNIFICANT CHANGE UP (ref 98–107)
CO2 SERPL-SCNC: 24 MMOL/L — SIGNIFICANT CHANGE UP (ref 22–29)
CREAT SERPL-MCNC: 1.77 MG/DL — HIGH (ref 0.5–1.3)
ESTIMATED AVERAGE GLUCOSE: 166 MG/DL — HIGH (ref 68–114)
GLUCOSE BLDC GLUCOMTR-MCNC: 136 MG/DL — HIGH (ref 70–99)
GLUCOSE BLDC GLUCOMTR-MCNC: 136 MG/DL — HIGH (ref 70–99)
GLUCOSE BLDC GLUCOMTR-MCNC: 137 MG/DL — HIGH (ref 70–99)
GLUCOSE BLDC GLUCOMTR-MCNC: 170 MG/DL — HIGH (ref 70–99)
GLUCOSE SERPL-MCNC: 155 MG/DL — HIGH (ref 70–99)
HCT VFR BLD CALC: 26.7 % — LOW (ref 39–50)
HGB BLD-MCNC: 8.1 G/DL — LOW (ref 13–17)
MCHC RBC-ENTMCNC: 20.1 PG — LOW (ref 27–34)
MCHC RBC-ENTMCNC: 30.3 GM/DL — LOW (ref 32–36)
MCV RBC AUTO: 66.3 FL — LOW (ref 80–100)
PLATELET # BLD AUTO: 286 K/UL — SIGNIFICANT CHANGE UP (ref 150–400)
POTASSIUM SERPL-MCNC: 4.3 MMOL/L — SIGNIFICANT CHANGE UP (ref 3.5–5.3)
POTASSIUM SERPL-SCNC: 4.3 MMOL/L — SIGNIFICANT CHANGE UP (ref 3.5–5.3)
RBC # BLD: 4.03 M/UL — LOW (ref 4.2–5.8)
RBC # FLD: 20.6 % — HIGH (ref 10.3–14.5)
SODIUM SERPL-SCNC: 135 MMOL/L — SIGNIFICANT CHANGE UP (ref 135–145)
TSH SERPL-MCNC: 2.22 UIU/ML — SIGNIFICANT CHANGE UP (ref 0.27–4.2)
WBC # BLD: 8.16 K/UL — SIGNIFICANT CHANGE UP (ref 3.8–10.5)
WBC # FLD AUTO: 8.16 K/UL — SIGNIFICANT CHANGE UP (ref 3.8–10.5)

## 2021-04-21 PROCEDURE — 99233 SBSQ HOSP IP/OBS HIGH 50: CPT

## 2021-04-21 RX ORDER — SENNA PLUS 8.6 MG/1
2 TABLET ORAL DAILY
Refills: 0 | Status: DISCONTINUED | OUTPATIENT
Start: 2021-04-21 | End: 2021-05-01

## 2021-04-21 RX ADMIN — Medication 650 MILLIGRAM(S): at 18:30

## 2021-04-21 RX ADMIN — CEFTRIAXONE 100 MILLIGRAM(S): 500 INJECTION, POWDER, FOR SOLUTION INTRAMUSCULAR; INTRAVENOUS at 05:37

## 2021-04-21 RX ADMIN — Medication 2: at 11:40

## 2021-04-21 RX ADMIN — Medication 1 TABLET(S): at 13:27

## 2021-04-21 RX ADMIN — SENNA PLUS 2 TABLET(S): 8.6 TABLET ORAL at 16:17

## 2021-04-21 RX ADMIN — Medication 250 MILLIGRAM(S): at 18:30

## 2021-04-21 RX ADMIN — SODIUM CHLORIDE 3 MILLILITER(S): 9 INJECTION INTRAMUSCULAR; INTRAVENOUS; SUBCUTANEOUS at 21:30

## 2021-04-21 RX ADMIN — HEPARIN SODIUM 5000 UNIT(S): 5000 INJECTION INTRAVENOUS; SUBCUTANEOUS at 18:30

## 2021-04-21 RX ADMIN — Medication 1 MILLIGRAM(S): at 13:27

## 2021-04-21 RX ADMIN — Medication 25 MILLIGRAM(S): at 18:30

## 2021-04-21 NOTE — PROGRESS NOTE ADULT - ASSESSMENT
69 y/o M with PMHx urinary retention due to BPH requiring chronic bourne for past 3 years, chronic anemia, CKD Stage III, DM II, HTN, metastatic SB ca (not on chemo/rad), b/l cataracts legally blind presents to ED with generalized weakness, low grade temperature at home, with recent outpatient diagnosis of UTI that was being treated with Keflex.     #Catheter associated UTI  -Chronic bourne was replaced in ED - pyuria on placement  -In ED received Cefepime and Vanco IV  -Will continue with Rocephin IV for now  -Prelim urine culture + for Enterobacter cloacae  -Pending blood cultures x2  -WBC 8.16  -T-37.7 - TMax 38.1 from 4/19    #Fatigue/Weakness - Likely related to infection  -TSH WNL 2.22  -CPK     #Essential HTN  -BP has been controlled during hospital stay  -Metoprolol 25mg oral 2x daily    # DM II  -Patient on SS   -Glucose 155 from labs  -Elevated A1c - 7.4    #CKD III  -Baseline GFR 30-59  -BUN/Cr - 25/1.77  -Bourne in place   -Continue to monitor I&Os    #Chronic anemia  -Baseline hemoglobin 8-9  -CBC rechecked 8.1/26.7 @ appropriate baseline  -Patient hemodynamically stable    #Metastatic Bowel Ca   -As per conversation with daughter patient was diagnosed at [facility] with metastatic small bowel Ca. Patient refused to undergo chemo/radiation treatment.   -Dr. Lew's office (oncology) contacted regarding further information on patient's diagnosis. Call back number provided to office.     #Legally Blind   -b/l cataracts   -uses cane at baseline  -Lives at home with daughter Amelia     #DVT ppx: Heparin 5000U SQ q12  #GI ppx: Protonix 40mg oral before breakfast  #Diet: Diet to be changed to soft w/stated intolerance to starch   #Code Status: Full Code  #Disposition: Admit to medicine for further management, blood cx results pending

## 2021-04-21 NOTE — PROGRESS NOTE ADULT - SUBJECTIVE AND OBJECTIVE BOX
71 y/o male with history of urinary retention due to BPH with chronic indwelling bourne x 3 years, states last changed 3 days ago after it was leaking, HTN, HLD, legally blind, metastatic bowel ca (not on chemo/radiation) CKD-4 who lives at home with help from family for ADLs. He uses a walker at baseline. He was brought in by EMS with generalized weakness and felt like he was going to pass out. Denies LOC, or falls. No CP, SOB, N/V. He was noted to have low grade temp at home and in ED was 100.6 orally. New bourne was placed in ED with pyuria.     Patient admitted to medicine for further management of complicated UTI. Urine cx and blood cultures x2 pending. Patient received dose of Cefepime and Vanco in ER. Patient will continue with Rocephin IV antibiotics pending culture results.      INTERVAL HPI/OVERNIGHT EVENTS:  No overnight events  Patient requesting laxative/stool softener, states that takes mag citrate @ home PRN. Senna ordered, will re-assess response and place order for mag citrate if senna is ineffective.       - Upon assessment patient alert and appropriate to situation. Patient denies chest pain, palpitations, SOB, abdominal pain, or dysuria. States he has some abdominal cramping which he feels is likely related to his constipation that sometimes happens at home. When asked if patient takes any prescribed medications at home, patient denied. States that he feel like he doesn't need to take medications so he chooses when he wants to take certain medications. Patient's daughter Amelia at bedside, plan of care, and questions/concerns addressed with patient and daughter. Discussed that blood culture results are pending and disposition will be based on culture results. Cracked front tooth examined per daughter's request, no acute issues reinforced to patient to follow up outpatient.     Dr. Lew's office contacted regarding metastatic small bowel dx diagnosis. Patient was diagnosed at [facility] May of last year. Call back information provided to office for additional information regarding patient's diagnosis. As per patient's daughter, patient refused to undergo chemo/radiation treatment.           Vital Signs Last 24 Hrs  T(C): 37.7 (2021 11:35), Max: 37.9 (2021 04:28)  T(F): 99.9 (2021 11:35), Max: 100.2 (2021 04:28)  HR: 98 (2021 11:35) (98 - 107)  BP: 130/78 (2021 11:35) (117/72 - 147/69)  BP(mean): --  RR: 18 (2021 11:35) (18 - 20)  SpO2: 99% (2021 11:35) (96% - 99%)    PHYSICAL EXAM:  General: Well developed; well nourished; in no acute distress  Eyes: PERRLA, EOMI; conjunctiva and sclera clear  Head: Normocephalic; atraumatic  ENMT: No nasal discharge; airway clear. Cracked front tooth examined per daughter's request, no acute findings. FU OP   Neck: Supple; non tender; no masses  Respiratory: No wheezes, rales or rhonchi  Cardiovascular: Regular rate and rhythm. S1 and S2 Normal; No murmurs, gallops or rubs  Gastrointestinal: Soft non-tender non-distended; Normal bowel sounds  Genitourinary: No costovertebral angle tenderness  Extremities: Normal range of motion, No clubbing, cyanosis or edema  Vascular: Peripheral pulses palpable 2+ bilaterally  Neurological: Alert and oriented x4  Skin: Warm and dry. No acute rash  Lymph Nodes: No acute cervical adenopathy  Musculoskeletal: No deformities noted, uses cane at baseline  Psychiatric: Cooperative and appropriate    I&O's Detail    2021 07:01  -  2021 07:00  --------------------------------------------------------  IN:  Total IN: 0 mL    OUT:    Ureteral Catheter (mL): 2100 mL  Total OUT: 2100 mL    Total NET: -2100 mL          CARDIAC MARKERS ( 2021 06:54 )  x     / x     / 129 U/L / x     / x                                8.1    8.16  )-----------( 286      ( 2021 13:26 )             26.7     2021 13:26    135    |  98     |  25.0   ----------------------------<  155    4.3     |  24.0   |  1.77     Ca    8.9        2021 13:26  Phos  2.4       2021 06:54  Mg     2.1       2021 06:54    TPro  7.9    /  Alb  3.8    /  TBili  0.9    /  DBili  x      /  AST  39     /  ALT  14     /  AlkPhos  82     2021 16:24    PT/INR - ( 2021 16:24 )   PT: 15.6 sec;   INR: 1.36 ratio         PTT - ( 2021 16:24 )  PTT:28.2 sec  CAPILLARY BLOOD GLUCOSE      POCT Blood Glucose.: 170 mg/dL (2021 11:37)  POCT Blood Glucose.: 136 mg/dL (2021 08:04)  POCT Blood Glucose.: 176 mg/dL (2021 16:23)    LIVER FUNCTIONS - ( 2021 16:24 )  Alb: 3.8 g/dL / Pro: 7.9 g/dL / ALK PHOS: 82 U/L / ALT: 14 U/L / AST: 39 U/L / GGT: x           Urinalysis Basic - ( 2021 19:15 )    Color: Yellow / Appearance: Slightly Turbid / S.010 / pH: x  Gluc: x / Ketone: Negative  / Bili: Negative / Urobili: Negative mg/dL   Blood: x / Protein: 100 mg/dL / Nitrite: Negative   Leuk Esterase: Moderate / RBC: 0-2 /HPF / WBC >50   Sq Epi: x / Non Sq Epi: Occasional / Bacteria: Occasional        MEDICATIONS  (STANDING):  cefTRIAXone   IVPB 1000 milliGRAM(s) IV Intermittent every 24 hours  dextrose 40% Gel 15 Gram(s) Oral once  dextrose 5%. 1000 milliLiter(s) (50 mL/Hr) IV Continuous <Continuous>  dextrose 5%. 1000 milliLiter(s) (100 mL/Hr) IV Continuous <Continuous>  dextrose 50% Injectable 25 Gram(s) IV Push once  dextrose 50% Injectable 12.5 Gram(s) IV Push once  dextrose 50% Injectable 25 Gram(s) IV Push once  folic acid 1 milliGRAM(s) Oral daily  glucagon  Injectable 1 milliGRAM(s) IntraMuscular once  heparin   Injectable 5000 Unit(s) SubCutaneous every 12 hours  insulin lispro (ADMELOG) corrective regimen sliding scale   SubCutaneous three times a day before meals  insulin lispro (ADMELOG) corrective regimen sliding scale   SubCutaneous at bedtime  metoprolol tartrate 25 milliGRAM(s) Oral two times a day  multivitamin 1 Tablet(s) Oral daily  pantoprazole    Tablet 40 milliGRAM(s) Oral before breakfast  saccharomyces boulardii 250 milliGRAM(s) Oral two times a day  senna 2 Tablet(s) Oral daily  sodium bicarbonate 650 milliGRAM(s) Oral two times a day  sodium chloride 0.9% lock flush 3 milliLiter(s) IV Push every 8 hours  tamsulosin 0.8 milliGRAM(s) Oral at bedtime    MEDICATIONS  (PRN):  ondansetron Injectable 4 milliGRAM(s) IV Push every 6 hours PRN Nausea      RADIOLOGY & ADDITIONAL TESTS:  Xray Chest 1 View- PORTABLE-Urgent (21 @ 17:34)   IMPRESSION: No acute cardiopulmonary disease process.Cardiomegaly.

## 2021-04-22 LAB
GLUCOSE BLDC GLUCOMTR-MCNC: 128 MG/DL — HIGH (ref 70–99)
GLUCOSE BLDC GLUCOMTR-MCNC: 151 MG/DL — HIGH (ref 70–99)
GLUCOSE BLDC GLUCOMTR-MCNC: 215 MG/DL — HIGH (ref 70–99)

## 2021-04-22 PROCEDURE — 99233 SBSQ HOSP IP/OBS HIGH 50: CPT

## 2021-04-22 PROCEDURE — 74176 CT ABD & PELVIS W/O CONTRAST: CPT | Mod: 26

## 2021-04-22 RX ORDER — MEROPENEM 1 G/30ML
1000 INJECTION INTRAVENOUS EVERY 12 HOURS
Refills: 0 | Status: DISCONTINUED | OUTPATIENT
Start: 2021-04-22 | End: 2021-04-23

## 2021-04-22 RX ORDER — MEROPENEM 1 G/30ML
1000 INJECTION INTRAVENOUS ONCE
Refills: 0 | Status: COMPLETED | OUTPATIENT
Start: 2021-04-22 | End: 2021-04-22

## 2021-04-22 RX ORDER — MEROPENEM 1 G/30ML
INJECTION INTRAVENOUS
Refills: 0 | Status: DISCONTINUED | OUTPATIENT
Start: 2021-04-22 | End: 2021-04-23

## 2021-04-22 RX ORDER — MULTIVIT WITH MIN/MFOLATE/K2 340-15/3 G
1 POWDER (GRAM) ORAL ONCE
Refills: 0 | Status: COMPLETED | OUTPATIENT
Start: 2021-04-22 | End: 2021-04-22

## 2021-04-22 RX ADMIN — HEPARIN SODIUM 5000 UNIT(S): 5000 INJECTION INTRAVENOUS; SUBCUTANEOUS at 18:33

## 2021-04-22 RX ADMIN — CEFTRIAXONE 100 MILLIGRAM(S): 500 INJECTION, POWDER, FOR SOLUTION INTRAMUSCULAR; INTRAVENOUS at 04:31

## 2021-04-22 RX ADMIN — Medication 10 MILLIGRAM(S): at 21:32

## 2021-04-22 RX ADMIN — Medication 1 BOTTLE: at 06:22

## 2021-04-22 RX ADMIN — MEROPENEM 100 MILLIGRAM(S): 1 INJECTION INTRAVENOUS at 18:33

## 2021-04-22 RX ADMIN — SODIUM CHLORIDE 3 MILLILITER(S): 9 INJECTION INTRAMUSCULAR; INTRAVENOUS; SUBCUTANEOUS at 05:14

## 2021-04-22 NOTE — PROGRESS NOTE ADULT - SUBJECTIVE AND OBJECTIVE BOX
69 y/o male with history of urinary retention due to BPH with chronic indwelling bourne x 3 years, states last changed 3 days ago after it was leaking, HTN, HLD, legally blind, metastatic bowel ca (not on chemo/radiation) CKD-4 who lives at home with help from family for ADLs. He uses a walker at baseline. He was brought in by EMS with generalized weakness and felt like he was going to pass out. Denies LOC, or falls. No CP, SOB, N/V. He was noted to have low grade temp at home and in ED was 100.6 orally. New bourne was placed in ED with pyuria.     Patient admitted to medicine for further management of complicated UTI. Blood cultures x2 pending. Preliminary urine culture results + for enterobacter cloacae. Lab contacted, pending repeat sensitivity results due to MDRO on initial result. Patient received dose of Cefepime and Vanco in ER. Rocephin IV antibiotics changed to meropenem based on preliminary MDRO results.     INTERVAL HPI/OVERNIGHT EVENTS:  No overnight events  Patient received magnesium citrate this AM & senna yesterday due to likely constipation. Patient states that he takes mag citrate @ home PRN. Patient c/o generalized abdominal pain with inability to have BM. Patient additionally expressed that he has been feeling "feverish" Findings consistent with persistent low grade temperature during current hospitalization and current UTI.       4/22 - Upon assessment patient alert and appropriate to situation. Patient denies chest pain, palpitations, SOB or dysuria. States he has some abdominal cramping/pain which he feels is likely related to his constipation that sometimes happens at home. CT abdomen/pelvis ordered to r/o obstruction, no acute findings of obstruction noted on scan. Discussed that blood culture results and urine culture sensitivity results are pending.     Dr. Lew's office contacted regarding metastatic small bowel dx diagnosis yesterday 4/21. Patient was diagnosed at [facility] May of last year. Stated that patient had never come to the office so they do not have any information regarding the patient. Pending additional call back from patient's physician for additional diagnostic information. As per patient's daughter, patient refused to undergo chemo/radiation treatment.     Vital Signs Last 24 Hrs  T(C): 36.9 (22 Apr 2021 12:01), Max: 37.7 (22 Apr 2021 04:40)  T(F): 98.4 (22 Apr 2021 12:01), Max: 99.9 (22 Apr 2021 04:40)  HR: 94 (22 Apr 2021 12:01) (93 - 98)  BP: 165/75 (22 Apr 2021 12:01) (121/74 - 165/75)  BP(mean): --  RR: 16 (22 Apr 2021 12:01) (16 - 18)  SpO2: 98% (22 Apr 2021 12:01) (97% - 98%)      PHYSICAL EXAM:  General: Well developed; well nourished; in no acute distress  Eyes: PERRLA, EOMI; conjunctiva and sclera clear  Head: Normocephalic; atraumatic  ENMT: No nasal discharge; airway clear. Cracked front tooth examined per daughter's request, no acute findings. FU OP   Neck: Supple; non tender; no masses  Respiratory: No wheezes, rales or rhonchi  Cardiovascular: Regular rate and rhythm. S1 and S2 Normal; No murmurs, gallops or rubs  Gastrointestinal: Soft non-tender non-distended; Normal bowel sounds  Genitourinary: No costovertebral angle tenderness  Extremities: Normal range of motion, No clubbing, cyanosis or edema  Vascular: Peripheral pulses palpable 2+ bilaterally  Neurological: Alert and oriented x4  Skin: Warm and dry. No acute rash  Lymph Nodes: No acute cervical adenopathy  Musculoskeletal: No deformities noted, uses cane at baseline  Psychiatric: Cooperative and appropriate      I&O's Detail    21 Apr 2021 07:01  -  22 Apr 2021 07:00  --------------------------------------------------------  IN:    Oral Fluid: 960 mL  Total IN: 960 mL    OUT:    Ureteral Catheter (mL): 2900 mL  Total OUT: 2900 mL    Total NET: -1940 mL                                    8.1    8.16  )-----------( 286      ( 21 Apr 2021 13:26 )             26.7     21 Apr 2021 13:26    135    |  98     |  25.0   ----------------------------<  155    4.3     |  24.0   |  1.77     Ca    8.9        21 Apr 2021 13:26        CAPILLARY BLOOD GLUCOSE      POCT Blood Glucose.: 128 mg/dL (22 Apr 2021 08:35)  POCT Blood Glucose.: 137 mg/dL (21 Apr 2021 22:38)  POCT Blood Glucose.: 136 mg/dL (21 Apr 2021 16:47)          MEDICATIONS  (STANDING):  dextrose 40% Gel 15 Gram(s) Oral once  dextrose 5%. 1000 milliLiter(s) (50 mL/Hr) IV Continuous <Continuous>  dextrose 5%. 1000 milliLiter(s) (100 mL/Hr) IV Continuous <Continuous>  dextrose 50% Injectable 25 Gram(s) IV Push once  dextrose 50% Injectable 12.5 Gram(s) IV Push once  dextrose 50% Injectable 25 Gram(s) IV Push once  folic acid 1 milliGRAM(s) Oral daily  glucagon  Injectable 1 milliGRAM(s) IntraMuscular once  heparin   Injectable 5000 Unit(s) SubCutaneous every 12 hours  insulin lispro (ADMELOG) corrective regimen sliding scale   SubCutaneous three times a day before meals  insulin lispro (ADMELOG) corrective regimen sliding scale   SubCutaneous at bedtime  meropenem  IVPB      meropenem  IVPB 1000 milliGRAM(s) IV Intermittent every 12 hours  meropenem  IVPB 1000 milliGRAM(s) IV Intermittent once  metoprolol tartrate 25 milliGRAM(s) Oral two times a day  multivitamin 1 Tablet(s) Oral daily  pantoprazole    Tablet 40 milliGRAM(s) Oral before breakfast  saccharomyces boulardii 250 milliGRAM(s) Oral two times a day  senna 2 Tablet(s) Oral daily  sodium bicarbonate 650 milliGRAM(s) Oral two times a day  sodium chloride 0.9% lock flush 3 milliLiter(s) IV Push every 8 hours  tamsulosin 0.8 milliGRAM(s) Oral at bedtime    MEDICATIONS  (PRN):  ondansetron Injectable 4 milliGRAM(s) IV Push every 6 hours PRN Nausea        RADIOLOGY & ADDITIONAL TESTS:  Xray Chest 1 View- PORTABLE-Urgent (04.19.21 @ 17:34)   IMPRESSION: No acute cardiopulmonary disease process.Cardiomegaly.      CT Abdomen and Pelvis No Cont (04.22.21 @ 11:56)   IMPRESSION: Soft tissue fullness in the proximal fourth portion of the duodenum, which may correspond to known tumor. Large liver metastasis. Liquid stool filled colon. No evidence of bowel obstruction.

## 2021-04-22 NOTE — PROGRESS NOTE ADULT - ASSESSMENT
71 y/o M with PMHx urinary retention due to BPH requiring chronic bourne for past 3 years, chronic anemia, CKD Stage III, DM II, HTN, metastatic SB ca (not on chemo/rad), b/l cataracts legally blind presents to ED with generalized weakness, low grade temperature at home, with recent outpatient diagnosis of UTI that was being treated with Keflex.     #Catheter associated UTI  -Chronic bourne was replaced in ED - pyuria on placement  -In ED received Cefepime and Vanco IV  -Rocephin IV changed to Meropenem due to preliminary sensitivity reading of MDRO. Lab will run repeat result for confirmation.   -Prelim urine culture + for Enterobacter cloacae  -Pending blood cultures x2  -WBC 8.16  -T-36.9 - TMax 38.1 from 4/19 Patient has had low grade fever throughout current hospitalization.     #Fatigue/Weakness - Likely related to infection  -TSH WNL 2.22  -CPK     #Constipation  -Patient c/o of generalized abdominal pain/cramping  -Received senna yesterday and magnesium citrate this AM but has not had BM  -Patient takes magnesium citrate at home PRN for constipation  -CT abdomen/pelvis ordered to r/o bowel obstruction   -No evidence of bowel obstruction, liquid stool filled colon noted on CT report.  -Will order additional laxative PRN.      #Essential HTN  -BP has been controlled during hospital stay  -Metoprolol 25mg oral 2x daily    # DM II  -Patient on SS   -Glucose controlled on current regimen   -Elevated A1c - 7.4    #CKD III  -Baseline GFR 30-59  -BUN/Cr - 25/1.77  -Bourne in place   -Continue to monitor I&Os    #Chronic anemia  -Baseline hemoglobin 8-9  -CBC 8.1/26.7 @ appropriate baseline  -Patient hemodynamically stable    #Metastatic Bowel Ca   -As per conversation with daughter patient was diagnosed at [facility] with metastatic small bowel Ca. Patient refused to undergo chemo/radiation treatment.   -Dr. Lew's office (oncology) contacted regarding further information on patient's diagnosis. Call back number provided to office.     #Legally Blind   -b/l cataracts   -uses cane at baseline  -Lives at home with daughter Amelia     #DVT ppx: Heparin 5000U SQ q12  #GI ppx: Protonix 40mg oral before breakfast  #Diet: Diet to be changed to soft w/stated intolerance to starch   #Code Status: Full Code  #Disposition: Admit to medicine for further management, blood cx results pending         69 y/o M with PMHx urinary retention due to BPH requiring chronic bourne for past 3 years, chronic anemia, CKD Stage III, DM II, HTN, metastatic SB ca (not on chemo/rad), b/l cataracts legally blind presents to ED with generalized weakness, low grade temperature at home, with recent outpatient diagnosis of UTI that was being treated with Keflex.     #Catheter associated UTI  -Chronic bourne was replaced in ED - pyuria on placement  -In ED received Cefepime and Vanco IV  -Rocephin IV changed to Meropenem due to preliminary sensitivity reading of MDRO. Lab will run repeat result for confirmation.   -Prelim urine culture + for Enterobacter cloacae  -Pending blood cultures x2  -WBC 8.16  -T-36.9 - TMax 38.1 from 4/19 Patient has had low grade fever throughout current hospitalization.     #Fatigue/Weakness - Likely related to infection  -TSH WNL 2.22  -CPK     #Constipation  -Patient c/o of generalized abdominal pain/cramping  -Received senna yesterday and magnesium citrate this AM but has not had BM  -Patient takes magnesium citrate at home PRN for constipation  -CT abdomen/pelvis ordered to r/o bowel obstruction   -No evidence of bowel obstruction, liquid stool filled colon noted on CT report.  -Will order additional laxative PRN.      #Essential HTN  -BP has been controlled during hospital stay  -Metoprolol 25mg oral 2x daily    # DM II  -Patient on SS   -Glucose controlled on current regimen   -Elevated A1c - 7.4    #CKD III  -Baseline GFR 30-59  -BUN/Cr - 25/1.77  -Bourne in place   -Continue to monitor I&Os    #Chronic anemia  -Baseline hemoglobin 8-9  -CBC 8.1/26.7 @ appropriate baseline  -Patient hemodynamically stable    #Metastatic Bowel Ca   -As per conversation with daughter patient was diagnosed at [facility] with metastatic small bowel Ca. Patient refused to undergo chemo/radiation treatment.   -Dr. Lew's office (oncology) contacted regarding further information on patient's diagnosis. Call back number provided to office.   -CT abdomen/pelvis showing soft tissue fullness and large liver metastasis consistent with known diagnosis of metastatic bowel ca.     #Legally Blind   -b/l cataracts   -uses cane at baseline  -Lives at home with daughter Amelia     #DVT ppx: Heparin 5000U SQ q12  #GI ppx: Protonix 40mg oral before breakfast  #Diet: Diet to be changed to soft w/stated intolerance to starch   #Code Status: Full Code  #Disposition: Admited to medicine for further management, blood cx results, and repeat run from lab of urine cx/sensitivity pending

## 2021-04-23 LAB
-  AMIKACIN: SIGNIFICANT CHANGE UP
-  AMOXICILLIN/CLAVULANIC ACID: SIGNIFICANT CHANGE UP
-  AMPICILLIN/SULBACTAM: SIGNIFICANT CHANGE UP
-  AMPICILLIN: SIGNIFICANT CHANGE UP
-  AZTREONAM: SIGNIFICANT CHANGE UP
-  CEFAZOLIN: SIGNIFICANT CHANGE UP
-  CEFEPIME: SIGNIFICANT CHANGE UP
-  CEFOXITIN: SIGNIFICANT CHANGE UP
-  CEFTRIAXONE: SIGNIFICANT CHANGE UP
-  CIPROFLOXACIN: SIGNIFICANT CHANGE UP
-  ERTAPENEM: SIGNIFICANT CHANGE UP
-  GENTAMICIN: SIGNIFICANT CHANGE UP
-  IMIPENEM: SIGNIFICANT CHANGE UP
-  LEVOFLOXACIN: SIGNIFICANT CHANGE UP
-  MEROPENEM: SIGNIFICANT CHANGE UP
-  NITROFURANTOIN: SIGNIFICANT CHANGE UP
-  PIPERACILLIN/TAZOBACTAM: SIGNIFICANT CHANGE UP
-  TIGECYCLINE: SIGNIFICANT CHANGE UP
-  TOBRAMYCIN: SIGNIFICANT CHANGE UP
-  TRIMETHOPRIM/SULFAMETHOXAZOLE: SIGNIFICANT CHANGE UP
ANION GAP SERPL CALC-SCNC: 13 MMOL/L — SIGNIFICANT CHANGE UP (ref 5–17)
BUN SERPL-MCNC: 31 MG/DL — HIGH (ref 8–20)
CALCIUM SERPL-MCNC: 8.9 MG/DL — SIGNIFICANT CHANGE UP (ref 8.6–10.2)
CHLORIDE SERPL-SCNC: 100 MMOL/L — SIGNIFICANT CHANGE UP (ref 98–107)
CO2 SERPL-SCNC: 23 MMOL/L — SIGNIFICANT CHANGE UP (ref 22–29)
CREAT SERPL-MCNC: 2.08 MG/DL — HIGH (ref 0.5–1.3)
CULTURE RESULTS: SIGNIFICANT CHANGE UP
CULTURE RESULTS: SIGNIFICANT CHANGE UP
GLUCOSE BLDC GLUCOMTR-MCNC: 146 MG/DL — HIGH (ref 70–99)
GLUCOSE BLDC GLUCOMTR-MCNC: 163 MG/DL — HIGH (ref 70–99)
GLUCOSE BLDC GLUCOMTR-MCNC: 171 MG/DL — HIGH (ref 70–99)
GLUCOSE BLDC GLUCOMTR-MCNC: 174 MG/DL — HIGH (ref 70–99)
GLUCOSE SERPL-MCNC: 142 MG/DL — HIGH (ref 70–99)
HCT VFR BLD CALC: 23.7 % — LOW (ref 39–50)
HGB BLD-MCNC: 7.3 G/DL — LOW (ref 13–17)
MCHC RBC-ENTMCNC: 20.1 PG — LOW (ref 27–34)
MCHC RBC-ENTMCNC: 30.8 GM/DL — LOW (ref 32–36)
MCV RBC AUTO: 65.3 FL — LOW (ref 80–100)
METHOD TYPE: SIGNIFICANT CHANGE UP
OB PNL STL: NEGATIVE — SIGNIFICANT CHANGE UP
ORGANISM # SPEC MICROSCOPIC CNT: SIGNIFICANT CHANGE UP
ORGANISM # SPEC MICROSCOPIC CNT: SIGNIFICANT CHANGE UP
PLATELET # BLD AUTO: 276 K/UL — SIGNIFICANT CHANGE UP (ref 150–400)
POTASSIUM SERPL-MCNC: 4.5 MMOL/L — SIGNIFICANT CHANGE UP (ref 3.5–5.3)
POTASSIUM SERPL-SCNC: 4.5 MMOL/L — SIGNIFICANT CHANGE UP (ref 3.5–5.3)
RBC # BLD: 3.63 M/UL — LOW (ref 4.2–5.8)
RBC # FLD: 20.2 % — HIGH (ref 10.3–14.5)
SODIUM SERPL-SCNC: 136 MMOL/L — SIGNIFICANT CHANGE UP (ref 135–145)
SPECIMEN SOURCE: SIGNIFICANT CHANGE UP
WBC # BLD: 5.28 K/UL — SIGNIFICANT CHANGE UP (ref 3.8–10.5)
WBC # FLD AUTO: 5.28 K/UL — SIGNIFICANT CHANGE UP (ref 3.8–10.5)

## 2021-04-23 PROCEDURE — 99223 1ST HOSP IP/OBS HIGH 75: CPT

## 2021-04-23 PROCEDURE — 99233 SBSQ HOSP IP/OBS HIGH 50: CPT

## 2021-04-23 RX ORDER — MEROPENEM-VABORBACTAM 1; 1 G/2G; G/2G
2 INJECTION, POWDER, FOR SOLUTION INTRAVENOUS ONCE
Refills: 0 | Status: COMPLETED | OUTPATIENT
Start: 2021-04-23 | End: 2021-04-23

## 2021-04-23 RX ORDER — SUCRALFATE 1 G
1 TABLET ORAL
Refills: 0 | Status: DISCONTINUED | OUTPATIENT
Start: 2021-04-23 | End: 2021-05-01

## 2021-04-23 RX ORDER — PANTOPRAZOLE SODIUM 20 MG/1
40 TABLET, DELAYED RELEASE ORAL DAILY
Refills: 0 | Status: DISCONTINUED | OUTPATIENT
Start: 2021-04-23 | End: 2021-05-01

## 2021-04-23 RX ORDER — MEROPENEM-VABORBACTAM 1; 1 G/2G; G/2G
2 INJECTION, POWDER, FOR SOLUTION INTRAVENOUS EVERY 8 HOURS
Refills: 0 | Status: COMPLETED | OUTPATIENT
Start: 2021-04-23 | End: 2021-04-30

## 2021-04-23 RX ADMIN — PANTOPRAZOLE SODIUM 40 MILLIGRAM(S): 20 TABLET, DELAYED RELEASE ORAL at 17:00

## 2021-04-23 RX ADMIN — SODIUM CHLORIDE 3 MILLILITER(S): 9 INJECTION INTRAMUSCULAR; INTRAVENOUS; SUBCUTANEOUS at 05:39

## 2021-04-23 RX ADMIN — Medication 2: at 16:58

## 2021-04-23 RX ADMIN — MEROPENEM-VABORBACTAM 83.33 GRAM(S): 1; 1 INJECTION, POWDER, FOR SOLUTION INTRAVENOUS at 18:33

## 2021-04-23 RX ADMIN — Medication 1 GRAM(S): at 21:15

## 2021-04-23 RX ADMIN — Medication 2: at 11:58

## 2021-04-23 RX ADMIN — SODIUM CHLORIDE 3 MILLILITER(S): 9 INJECTION INTRAMUSCULAR; INTRAVENOUS; SUBCUTANEOUS at 21:14

## 2021-04-23 RX ADMIN — MEROPENEM 100 MILLIGRAM(S): 1 INJECTION INTRAVENOUS at 05:38

## 2021-04-23 RX ADMIN — MEROPENEM 100 MILLIGRAM(S): 1 INJECTION INTRAVENOUS at 17:02

## 2021-04-23 RX ADMIN — Medication 1 GRAM(S): at 17:01

## 2021-04-23 RX ADMIN — HEPARIN SODIUM 5000 UNIT(S): 5000 INJECTION INTRAVENOUS; SUBCUTANEOUS at 05:38

## 2021-04-23 RX ADMIN — SODIUM CHLORIDE 3 MILLILITER(S): 9 INJECTION INTRAMUSCULAR; INTRAVENOUS; SUBCUTANEOUS at 11:53

## 2021-04-23 NOTE — PROGRESS NOTE ADULT - SUBJECTIVE AND OBJECTIVE BOX
CC: Catheter associated UTI (23 Apr 2021 18:07)    INTERVAL HPI/OVERNIGHT EVENTS:  no acute events overnight  patient with BM x 2    PHYSICAL EXAM:  General: Well developed; in no acute distress  Eyes: conjunctiva and sclera clear; blind in both eyes  Neck: Supple; non tender; no masses  Respiratory: No wheezes, rales or rhonchi  Cardiovascular: Regular rate and rhythm. S1 and S2 Normal; No murmurs, gallops or rubs  Gastrointestinal: Soft less tender less distended; Normal bowel sounds  Extremities: Normal range of motion, No clubbing, cyanosis or edema  Vascular: Peripheral pulses palpable 2+ bilaterally  Neurological: Alert and oriented x4  Skin: Warm and dry. No acute rash  Lymph Nodes: No acute cervical adenopathy  Musculoskeletal: Normal gait, tone, without deformities  Psychiatric: Cooperative and appropriate    I&O's Detail    23 Apr 2021 07:01  -  24 Apr 2021 07:00  --------------------------------------------------------  IN:  Total IN: 0 mL    OUT:    Ureteral Catheter (mL): 900 mL  Total OUT: 900 mL    Total NET: -900 mL                        7.3    5.28  )-----------( 276      ( 23 Apr 2021 07:52 )             23.7     23 Apr 2021 07:52    136    |  100    |  31.0   ----------------------------<  142    4.5     |  23.0   |  2.08     Ca    8.9        23 Apr 2021 07:52    MEDICATIONS  (STANDING):  dextrose 40% Gel 15 Gram(s) Oral once  dextrose 5%. 1000 milliLiter(s) (50 mL/Hr) IV Continuous <Continuous>  dextrose 5%. 1000 milliLiter(s) (100 mL/Hr) IV Continuous <Continuous>  dextrose 50% Injectable 25 Gram(s) IV Push once  dextrose 50% Injectable 12.5 Gram(s) IV Push once  dextrose 50% Injectable 25 Gram(s) IV Push once  folic acid 1 milliGRAM(s) Oral daily  glucagon  Injectable 1 milliGRAM(s) IntraMuscular once  heparin   Injectable 5000 Unit(s) SubCutaneous every 12 hours  insulin lispro (ADMELOG) corrective regimen sliding scale   SubCutaneous three times a day before meals  insulin lispro (ADMELOG) corrective regimen sliding scale   SubCutaneous at bedtime  meropenem/vaborbactam IVPB 2 Gram(s) IV Intermittent every 8 hours  metoprolol tartrate 25 milliGRAM(s) Oral two times a day  multivitamin 1 Tablet(s) Oral daily  pantoprazole  Injectable 40 milliGRAM(s) IV Push daily  saccharomyces boulardii 250 milliGRAM(s) Oral two times a day  senna 2 Tablet(s) Oral daily  sodium bicarbonate 650 milliGRAM(s) Oral two times a day  sodium chloride 0.9% lock flush 3 milliLiter(s) IV Push every 8 hours  sucralfate 1 Gram(s) Oral four times a day  tamsulosin 0.8 milliGRAM(s) Oral at bedtime    MEDICATIONS  (PRN):  ondansetron Injectable 4 milliGRAM(s) IV Push every 6 hours PRN Nausea    RADIOLOGY & ADDITIONAL TESTS:

## 2021-04-23 NOTE — PROGRESS NOTE ADULT - ASSESSMENT
69 y/o M with PMHx urinary retention due to BPH requiring chronic bourne for past 3 years, chronic anemia, CKD Stage III, DM II, HTN, metastatic SB ca (not on chemo/rad), b/l cataracts legally blind presents to ED with generalized weakness, low grade temperature at home, with recent outpatient diagnosis of UTI that was being treated with Keflex.     #Catheter associated UTI  -Chronic bourne was replaced in ED - pyuria on placement  -In ED received Cefepime and Vanco IV  -Rocephin IV changed to Meropenem due to preliminary sensitivity reading of MDRO. Lab will run repeat result for confirmation.   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 Lew office via [facility]    #Essential HTN  -Metoprolol 25mg oral 2x daily    # DM II  -Patient on SS   -Glucose controlled on current regimen   -Elevated A1c - 7.4    #CKD III  -Baseline GFR 30-59  -BUN/Cr - 25/1.77  -Bourne in place   -Continue to monitor I&Os    #Chronic anemia  -Baseline hemoglobin 8-9  -CBC 8.1/26.7 @ appropriate baseline  -Patient hemodynamically stable    #Legally Blind   -b/l cataracts   -uses cane at baseline  -Lives at home with daughter Amelia     #DVT ppx: Heparin 5000U SQ q12  #GI ppx: Protonix 40mg oral before breakfast  #Diet: Diet to be changed to soft w/stated intolerance to starch   #Code Status: Full Code  #Disposition: will have to restart clock on antibiotics (day 1 being yesterday); maybe able to finish abx IV at home or other facility; ID recs pending

## 2021-04-23 NOTE — CONSULT NOTE ADULT - SUBJECTIVE AND OBJECTIVE BOX
[facility]  INFECTIOUS DISEASES AND INTERNAL MEDICINE at [place]  =======================================================  Jamel Horta MD  Diplomates American Board of Internal Medicine and Infectious Diseases  Tel: [phone]      Fax: [phone]  =======================================================      [facility]-[id number]  ANTOINETTE WILSON    CC: Patient is a 70y old  Male who presents with a chief complaint of Catheter associated UTI (22 Apr 2021 12:30)      71 y/o M with PMHx urinary retention due to BPH requiring chronic bourne for past 3 years, chronic anemia, CKD Stage III, DM II, HTN, metastatic SB ca (not on chemo/rad), b/l cataracts legally blind presents to ED with generalized weakness, low grade temperature at home, with recent outpatient diagnosis of UTI that was being treated with Keflex. Last bourne change 4/16/21 due to leak, changed every month. Last CAUTI in March at [facility]. h/o frequent uti  Reports low grade fever and abdominal discomfort, typical of symptoms during uti. Here has been on meropenem, up until yesterday with sligtly elevated temperature, patient continues with abdominal discomfort. no flank pain  UCX enterobacter CRE      Past Medical & Surgical Hx:  PAST MEDICAL & SURGICAL HISTORY:  Diabetes    Chronic anemia  baseilne Hbg 8-9    CKD (chronic kidney disease) stage 3, GFR 30-59 ml/min    Other secondary cataract of both eyes  legally blind    Urinary retention  chronic bourne    HTN (hypertension)    No significant past surgical history            Social Hx:    FAMILY HISTORY:  Family history of diabetes mellitus (Mother)        Allergies    amoxicillin (Rash)    Intolerances             REVIEW OF SYSTEMS:  CONSTITUTIONAL:  No Fever or chills  HEENT:  No diplopia or blurred vision.  No earache, sore throat or runny nose.  CARDIOVASCULAR:  No pressure, squeezing, strangling, tightness, heaviness or aching about the chest, neck, axilla or epigastrium.  RESPIRATORY:  No cough, shortness of breath  GASTROINTESTINAL:  No nausea, vomiting or diarrhea.  GENITOURINARY:  No dysuria, frequency or urgency. No Blood in urine  MUSCULOSKELETAL:  no joint aches, no muscle pain  SKIN:  No change in skin, hair or nails.  NEUROLOGIC:  No Headaches, seizures or weakness.  PSYCHIATRIC:  No disorder of thought or mood.  ENDOCRINE:  No heat or cold intolerance  HEMATOLOGICAL:  No easy bruising or bleeding.       Physical Exam:    GEN: NAD, pleasant legally blind  HEENT: normocephalic and atraumatic. EOMI. PERRL.  Anicteric  NECK: Supple.   LUNGS: Clear to auscultation.  HEART: Regular rate and rhythm without murmur.  ABDOMEN: Soft, nontender, and nondistended.  Positive bowel sounds.    : No CVA tenderness catheter in place  EXTREMITIES: Without any edema.  MSK: No joint swelling  NEUROLOGIC: Cranial nerves II through XII are grossly intact. No Focal Deficits  PSYCHIATRIC: Appropriate affect .  SKIN: No Rash        Vitals:    T(F): 97.3 (23 Apr 2021 10:46), Max: 98.5 (22 Apr 2021 20:11)  HR: 93 (23 Apr 2021 17:20)  BP: 149/84 (23 Apr 2021 17:20)  RR: 18 (23 Apr 2021 17:20)  SpO2: 97% (23 Apr 2021 17:20) (94% - 97%)  temp max in last 48H T(F): , Max: 99.9 (04-22-21 @ 04:40)    Current Antibiotics:  meropenem/vaborbactam IVPB 2 Gram(s) IV Intermittent every 8 hours  meropenem/vaborbactam IVPB 2 Gram(s) IV Intermittent once    Other medications:  dextrose 40% Gel 15 Gram(s) Oral once  dextrose 5%. 1000 milliLiter(s) IV Continuous <Continuous>  dextrose 5%. 1000 milliLiter(s) IV Continuous <Continuous>  dextrose 50% Injectable 25 Gram(s) IV Push once  dextrose 50% Injectable 12.5 Gram(s) IV Push once  dextrose 50% Injectable 25 Gram(s) IV Push once  folic acid 1 milliGRAM(s) Oral daily  glucagon  Injectable 1 milliGRAM(s) IntraMuscular once  heparin   Injectable 5000 Unit(s) SubCutaneous every 12 hours  insulin lispro (ADMELOG) corrective regimen sliding scale   SubCutaneous three times a day before meals  insulin lispro (ADMELOG) corrective regimen sliding scale   SubCutaneous at bedtime  metoprolol tartrate 25 milliGRAM(s) Oral two times a day  multivitamin 1 Tablet(s) Oral daily  pantoprazole  Injectable 40 milliGRAM(s) IV Push daily  saccharomyces boulardii 250 milliGRAM(s) Oral two times a day  senna 2 Tablet(s) Oral daily  sodium bicarbonate 650 milliGRAM(s) Oral two times a day  sodium chloride 0.9% lock flush 3 milliLiter(s) IV Push every 8 hours  sucralfate 1 Gram(s) Oral four times a day  tamsulosin 0.8 milliGRAM(s) Oral at bedtime                            7.3    5.28  )-----------( 276      ( 23 Apr 2021 07:52 )             23.7     04-23    136  |  100  |  31.0<H>  ----------------------------<  142<H>  4.5   |  23.0  |  2.08<H>    Ca    8.9      23 Apr 2021 07:52      RECENT CULTURES:  04-20 @ 00:43 .Urine Clean Catch (Midstream) Enterobacter cloacae (Carbapenem Resistant)    10,000 - 49,000 CFU/mL Enterobacter cloacae complex (Carbapenem Resistant)  Culture in progress        04-19 @ 16:33 .Blood Blood-Peripheral     No growth at 48 hours        04-19 @ 16:32 .Blood Blood-Peripheral     No growth at 48 hours        04-19 @ 16:30          NotDete      WBC Count: 5.28 K/uL (04-23-21 @ 07:52)  WBC Count: 8.16 K/uL (04-21-21 @ 13:26)  WBC Count: 8.99 K/uL (04-20-21 @ 06:54)  WBC Count: 10.44 K/uL (04-19-21 @ 16:24)    Creatinine, Serum: 2.08 mg/dL (04-23-21 @ 07:52)  Creatinine, Serum: 1.77 mg/dL (04-21-21 @ 13:26)  Creatinine, Serum: 1.67 mg/dL (04-20-21 @ 06:54)  Creatinine, Serum: 1.73 mg/dL (04-19-21 @ 16:24)      Ferritin, Serum: 73 ng/mL (04-20-21 @ 06:54)         SARS-CoV-2: NotDetec (04-19-21 @ 16:30)  Rapid RVP Result: NotDetec (04-19-21 @ 16:30)

## 2021-04-24 LAB
-  CEFTAZIDIME/AVIBACTAM: SIGNIFICANT CHANGE UP
-  MEROPENEM/VABORBACTAM: SIGNIFICANT CHANGE UP
CULTURE RESULTS: NO GROWTH — SIGNIFICANT CHANGE UP
CULTURE RESULTS: SIGNIFICANT CHANGE UP
CULTURE RESULTS: SIGNIFICANT CHANGE UP
GLUCOSE BLDC GLUCOMTR-MCNC: 165 MG/DL — HIGH (ref 70–99)
GLUCOSE BLDC GLUCOMTR-MCNC: 180 MG/DL — HIGH (ref 70–99)
GLUCOSE BLDC GLUCOMTR-MCNC: 189 MG/DL — HIGH (ref 70–99)
GLUCOSE BLDC GLUCOMTR-MCNC: 191 MG/DL — HIGH (ref 70–99)
METHOD TYPE: SIGNIFICANT CHANGE UP
ORGANISM # SPEC MICROSCOPIC CNT: SIGNIFICANT CHANGE UP
SPECIMEN SOURCE: SIGNIFICANT CHANGE UP

## 2021-04-24 PROCEDURE — 99233 SBSQ HOSP IP/OBS HIGH 50: CPT

## 2021-04-24 RX ORDER — MULTIVIT WITH MIN/MFOLATE/K2 340-15/3 G
1 POWDER (GRAM) ORAL ONCE
Refills: 0 | Status: COMPLETED | OUTPATIENT
Start: 2021-04-24 | End: 2021-04-24

## 2021-04-24 RX ADMIN — Medication 1 GRAM(S): at 16:55

## 2021-04-24 RX ADMIN — HEPARIN SODIUM 5000 UNIT(S): 5000 INJECTION INTRAVENOUS; SUBCUTANEOUS at 05:30

## 2021-04-24 RX ADMIN — PANTOPRAZOLE SODIUM 40 MILLIGRAM(S): 20 TABLET, DELAYED RELEASE ORAL at 11:35

## 2021-04-24 RX ADMIN — SODIUM CHLORIDE 3 MILLILITER(S): 9 INJECTION INTRAMUSCULAR; INTRAVENOUS; SUBCUTANEOUS at 21:50

## 2021-04-24 RX ADMIN — SODIUM CHLORIDE 3 MILLILITER(S): 9 INJECTION INTRAMUSCULAR; INTRAVENOUS; SUBCUTANEOUS at 11:31

## 2021-04-24 RX ADMIN — Medication 1 BOTTLE: at 18:35

## 2021-04-24 RX ADMIN — MEROPENEM-VABORBACTAM 83.33 GRAM(S): 1; 1 INJECTION, POWDER, FOR SOLUTION INTRAVENOUS at 02:55

## 2021-04-24 RX ADMIN — MEROPENEM-VABORBACTAM 83.33 GRAM(S): 1; 1 INJECTION, POWDER, FOR SOLUTION INTRAVENOUS at 21:52

## 2021-04-24 RX ADMIN — HEPARIN SODIUM 5000 UNIT(S): 5000 INJECTION INTRAVENOUS; SUBCUTANEOUS at 16:55

## 2021-04-24 RX ADMIN — Medication 1 GRAM(S): at 05:30

## 2021-04-24 RX ADMIN — Medication 2: at 08:06

## 2021-04-24 RX ADMIN — SODIUM CHLORIDE 3 MILLILITER(S): 9 INJECTION INTRAMUSCULAR; INTRAVENOUS; SUBCUTANEOUS at 05:30

## 2021-04-24 RX ADMIN — Medication 1 GRAM(S): at 11:35

## 2021-04-24 RX ADMIN — Medication 2: at 11:35

## 2021-04-24 RX ADMIN — Medication 2: at 16:54

## 2021-04-24 RX ADMIN — MEROPENEM-VABORBACTAM 83.33 GRAM(S): 1; 1 INJECTION, POWDER, FOR SOLUTION INTRAVENOUS at 12:37

## 2021-04-24 NOTE — PROGRESS NOTE ADULT - ASSESSMENT
71 y/o M with PMHx urinary retention due to BPH requiring chronic bourne for past 3 years, chronic anemia, CKD Stage III, DM II, HTN, metastatic SB ca (not on chemo/rad), b/l cataracts legally blind presents to ED with generalized weakness, low grade temperature at home, with recent outpatient diagnosis of UTI that was being treated with Keflex.     #Catheter associated UTI  -Chronic bourne was replaced in ED - pyuria on placement  -In ED received Cefepime and Vanco IV  -Rocephin IV changed to Meropenem due to preliminary sensitivity reading of MDRO. Lab will run repeat result for confirmation.   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 Lew office via [facility]    #Essential HTN  -Metoprolol 25mg oral 2x daily    # DM II  -Patient on SS   -Glucose controlled on current regimen   -Elevated A1c - 7.4    #CKD III  -Baseline GFR 30-59  -Buorne in place   -Continue to monitor I&Os    #Chronic anemia  -Baseline hemoglobin 8-9  -Patient hemodynamically stable    #Legally Blind   -b/l cataracts   -uses cane at baseline  -Lives at home with daughter Amelia     #DVT ppx: Heparin 5000U SQ q12  #GI ppx: Protonix 40mg oral before breakfast  #Diet: Diet to be changed to soft w/stated intolerance to starch   #Code Status: Full Code  #Disposition: Pending antibiotic duration.   69 y/o M with PMHx urinary retention due to BPH requiring chronic bourne for past 3 years, chronic anemia, CKD Stage III, DM II, HTN, metastatic SB ca (not on chemo/rad), b/l cataracts legally blind presents to ED with generalized weakness, low grade temperature at home, with recent outpatient diagnosis of UTI that was being treated with Keflex.     #Catheter associated UTI  -Chronic bourne was replaced in ED - pyuria on placement  -In ED received Cefepime and Vanco IV  -Rocephin IV changed to Meropenem due to preliminary sensitivity reading of MDRO. Lab will run repeat result for confirmation.   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 Lew office via [facility]    #Essential HTN  -Metoprolol 25mg oral 2x daily    # DM II  -Patient on SS   -Glucose controlled on current regimen   -Elevated A1c - 7.4    #CKD III  -Baseline GFR 30-59  -Bourne in place   -Continue to monitor I&Os    #Chronic anemia  -Baseline hemoglobin 8-9  -Patient hemodynamically stable    #Legally Blind   -b/l cataracts   -uses cane at baseline  -Lives at home with daughter Amelia     #DVT ppx: Heparin 5000U SQ q12  #GI ppx: Protonix 40mg oral before breakfast  #Diet: Diet to be changed to soft w/stated intolerance to starch   #Code Status: Full Code  #Disposition: Pending antibiotic duration.    Attempted to call daughter, no answer. VM full.

## 2021-04-24 NOTE — CONSULT NOTE ADULT - SUBJECTIVE AND OBJECTIVE BOX
Patient is a 70y old  Male who presents with a chief complaint of Catheter associated UTI (24 Apr 2021 11:36)       HPI:  69 y/o male with history of urinary retention due to BPH with chronic indwelling bourne x 3 years, states last changed 3 days ago after it was leaking, HTN, HLD, legally blind, CKD-4 who lives at home with help from family for ADLs. He uses a walker at baseline. He was brought in for feeling generalized weakness earlier today and felt like he was going to pass out. Denies LOC, or falls. No CP, SOB, N/V. He was noted to have low grade temp at home and in ED was 100.6 orally. U/A off new bourne placed in ED with pyuria. No hypotension, no signs of new end organ damage. He was given Cefepime and Vancomycin. Currently feels back to baseline.  (19 Apr 2021 23:19) Was found to have HARLEEN by KDIGO criteria prompting renal consult.  PAtient sees Dr. Carr [facility].  Bourne catheter was changed in ER.  Has abdominal pain.  Denies any N/V/Diarrhea.        PAST MEDICAL & SURGICAL HISTORY:  Diabetes    Chronic anemia  baseilne Hbg 8-9    CKD (chronic kidney disease) stage 3, GFR 30-59 ml/min    Other secondary cataract of both eyes  legally blind    Urinary retention  chronic bourne    HTN (hypertension)    No significant past surgical history         FAMILY HISTORY:  Family history of diabetes mellitus (Mother)    NC    Social History:Non smoker    MEDICATIONS  (STANDING):  dextrose 40% Gel 15 Gram(s) Oral once  dextrose 5%. 1000 milliLiter(s) (50 mL/Hr) IV Continuous <Continuous>  dextrose 5%. 1000 milliLiter(s) (100 mL/Hr) IV Continuous <Continuous>  dextrose 50% Injectable 25 Gram(s) IV Push once  dextrose 50% Injectable 12.5 Gram(s) IV Push once  dextrose 50% Injectable 25 Gram(s) IV Push once  folic acid 1 milliGRAM(s) Oral daily  glucagon  Injectable 1 milliGRAM(s) IntraMuscular once  heparin   Injectable 5000 Unit(s) SubCutaneous every 12 hours  insulin lispro (ADMELOG) corrective regimen sliding scale   SubCutaneous three times a day before meals  insulin lispro (ADMELOG) corrective regimen sliding scale   SubCutaneous at bedtime  meropenem/vaborbactam IVPB 2 Gram(s) IV Intermittent every 8 hours  metoprolol tartrate 25 milliGRAM(s) Oral two times a day  multivitamin 1 Tablet(s) Oral daily  pantoprazole  Injectable 40 milliGRAM(s) IV Push daily  saccharomyces boulardii 250 milliGRAM(s) Oral two times a day  senna 2 Tablet(s) Oral daily  sodium bicarbonate 650 milliGRAM(s) Oral two times a day  sodium chloride 0.9% lock flush 3 milliLiter(s) IV Push every 8 hours  sucralfate 1 Gram(s) Oral four times a day  tamsulosin 0.8 milliGRAM(s) Oral at bedtime    MEDICATIONS  (PRN):  ondansetron Injectable 4 milliGRAM(s) IV Push every 6 hours PRN Nausea   Meds reviewed    Allergies    amoxicillin (Rash)    Intolerances         REVIEW OF SYSTEMS:    Review of Systems:   Constitutional: Denies fatigue  HEENT: Denies headaches and dizziness  Respiratory: denies SOB, cough, or wheezing  Cardiovascular: denies CP, palpitations  Gastrointestinal: Denies nausea, denies vomiting, diarrhea, constipation, + abdominal pain  Genitourinary: denies painful urination, increased frequency, urgency, or bloody urine  Skin: denies rashes or itching  Musculoskeletal: denies muscle aches, joint swelling  Neurologic: Denies generalized weakness, denies loss of sensation, numbness, or tingling      Vital Signs Last 24 Hrs  T(C): 36.8 (24 Apr 2021 05:28), Max: 36.8 (24 Apr 2021 05:28)  T(F): 98.2 (24 Apr 2021 05:28), Max: 98.2 (24 Apr 2021 05:28)  HR: 89 (24 Apr 2021 05:28) (89 - 93)  BP: 134/78 (24 Apr 2021 05:28) (134/78 - 149/84)  BP(mean): --  RR: 17 (24 Apr 2021 05:28) (17 - 18)  SpO2: 94% (24 Apr 2021 05:28) (94% - 97%)  Daily     Daily     PHYSICAL EXAM:    GENERAL: NAD  HEAD:  Atraumatic, Normocephalic  EYES: Blind  ENMT: No Drainage from nares, No drainage from ears  NECK: Supple, neck  veins full  NERVOUS SYSTEM:  Awake and Alert  CHEST/LUNG: Clear to percussion bilaterally; No rales, rhonchi, wheezing, or rubs  HEART: Regular rate and rhythm; No murmurs, rubs, or gallops  ABDOMEN: Soft, Nontender, Nondistended; Bowel sounds present  EXTREMITIES:  No Edema  SKIN: No rashes No obvious ecchymosis      LABS:                        7.3    5.28  )-----------( 276      ( 23 Apr 2021 07:52 )             23.7     04-23    136  |  100  |  31.0<H>  ----------------------------<  142<H>  4.5   |  23.0  |  2.08<H>    Ca    8.9      23 Apr 2021 07:52                  RADIOLOGY & ADDITIONAL TESTS:

## 2021-04-24 NOTE — PROGRESS NOTE ADULT - SUBJECTIVE AND OBJECTIVE BOX
[facility] Division of Hospital Medicine  Pa Brown [phone]    Chief Complaint:  Patient is a 70y old  Male who presents with a chief complaint of Catheter associated UTI (23 Apr 2021 18:07)      SUBJECTIVE / OVERNIGHT EVENTS:  Pt seen and examined at bedside. No acute events reported overnight. No complaints.    Patient denies chest pain, SOB, abd pain, N/V, fever, chills, dysuria or any other complaints. All remainder ROS negative.     MEDICATIONS  (STANDING):  dextrose 40% Gel 15 Gram(s) Oral once  dextrose 5%. 1000 milliLiter(s) (50 mL/Hr) IV Continuous <Continuous>  dextrose 5%. 1000 milliLiter(s) (100 mL/Hr) IV Continuous <Continuous>  dextrose 50% Injectable 25 Gram(s) IV Push once  dextrose 50% Injectable 12.5 Gram(s) IV Push once  dextrose 50% Injectable 25 Gram(s) IV Push once  folic acid 1 milliGRAM(s) Oral daily  glucagon  Injectable 1 milliGRAM(s) IntraMuscular once  heparin   Injectable 5000 Unit(s) SubCutaneous every 12 hours  insulin lispro (ADMELOG) corrective regimen sliding scale   SubCutaneous three times a day before meals  insulin lispro (ADMELOG) corrective regimen sliding scale   SubCutaneous at bedtime  meropenem/vaborbactam IVPB 2 Gram(s) IV Intermittent every 8 hours  metoprolol tartrate 25 milliGRAM(s) Oral two times a day  multivitamin 1 Tablet(s) Oral daily  pantoprazole  Injectable 40 milliGRAM(s) IV Push daily  saccharomyces boulardii 250 milliGRAM(s) Oral two times a day  senna 2 Tablet(s) Oral daily  sodium bicarbonate 650 milliGRAM(s) Oral two times a day  sodium chloride 0.9% lock flush 3 milliLiter(s) IV Push every 8 hours  sucralfate 1 Gram(s) Oral four times a day  tamsulosin 0.8 milliGRAM(s) Oral at bedtime    MEDICATIONS  (PRN):  ondansetron Injectable 4 milliGRAM(s) IV Push every 6 hours PRN Nausea        I&O's Summary    23 Apr 2021 07:01  -  24 Apr 2021 07:00  --------------------------------------------------------  IN: 0 mL / OUT: 900 mL / NET: -900 mL        PHYSICAL EXAM:  Vital Signs Last 24 Hrs  T(C): 36.8 (24 Apr 2021 05:28), Max: 36.8 (24 Apr 2021 05:28)  T(F): 98.2 (24 Apr 2021 05:28), Max: 98.2 (24 Apr 2021 05:28)  HR: 89 (24 Apr 2021 05:28) (89 - 93)  BP: 134/78 (24 Apr 2021 05:28) (134/78 - 149/84)  BP(mean): --  RR: 17 (24 Apr 2021 05:28) (17 - 18)  SpO2: 94% (24 Apr 2021 05:28) (94% - 97%)        CONSTITUTIONAL: Pleasant, NAD, well-developed, well-groomed. Legally blind.  HEENT: NC/AT, PERRL, no JVD  RESPIRATORY: CTA bilaterally, normal effort  CARDIOVASCULAR: RRR, S1/S2+, no m/g/r  ABDOMEN: Nontender to palpation, normoactive bowel sounds, no rebound/guarding; No hepatosplenomegaly  MUSCLOSKELETAL: No edema, cyanosis or deformities.  PSYCH: A+O to person, place, and time; affect appropriate  NEUROLOGY: CN 2-12 are intact and symmetric; no gross neurological deficits.  SKIN: No rashes; no palpable lesions  VASC: Distal pulses palpable    LABS:                        7.3    5.28  )-----------( 276      ( 23 Apr 2021 07:52 )             23.7     04-23    136  |  100  |  31.0<H>  ----------------------------<  142<H>  4.5   |  23.0  |  2.08<H>    Ca    8.9      23 Apr 2021 07:52                Culture - Urine (collected 23 Apr 2021 13:06)  Source: .Urine Catheterized  Final Report (24 Apr 2021 09:41):    No growth      CAPILLARY BLOOD GLUCOSE      POCT Blood Glucose.: 191 mg/dL (24 Apr 2021 11:34)  POCT Blood Glucose.: 189 mg/dL (24 Apr 2021 08:06)  POCT Blood Glucose.: 171 mg/dL (23 Apr 2021 21:13)  POCT Blood Glucose.: 163 mg/dL (23 Apr 2021 16:58)  POCT Blood Glucose.: 174 mg/dL (23 Apr 2021 11:56)        RADIOLOGY & ADDITIONAL TESTS:  Results Reviewed:   Imaging Personally Reviewed:  Electrocardiogram Personally Reviewed:

## 2021-04-25 LAB
ALBUMIN SERPL ELPH-MCNC: 3.4 G/DL — SIGNIFICANT CHANGE UP (ref 3.3–5.2)
ALP SERPL-CCNC: 79 U/L — SIGNIFICANT CHANGE UP (ref 40–120)
ALT FLD-CCNC: 14 U/L — SIGNIFICANT CHANGE UP
ANION GAP SERPL CALC-SCNC: 16 MMOL/L — SIGNIFICANT CHANGE UP (ref 5–17)
ANISOCYTOSIS BLD QL: SLIGHT — SIGNIFICANT CHANGE UP
AST SERPL-CCNC: 33 U/L — SIGNIFICANT CHANGE UP
BASOPHILS # BLD AUTO: 0.05 K/UL — SIGNIFICANT CHANGE UP (ref 0–0.2)
BASOPHILS NFR BLD AUTO: 0.9 % — SIGNIFICANT CHANGE UP (ref 0–2)
BILIRUB SERPL-MCNC: <0.2 MG/DL — LOW (ref 0.4–2)
BUN SERPL-MCNC: 42 MG/DL — HIGH (ref 8–20)
CALCIUM SERPL-MCNC: 8.8 MG/DL — SIGNIFICANT CHANGE UP (ref 8.6–10.2)
CHLORIDE SERPL-SCNC: 99 MMOL/L — SIGNIFICANT CHANGE UP (ref 98–107)
CO2 SERPL-SCNC: 22 MMOL/L — SIGNIFICANT CHANGE UP (ref 22–29)
CREAT SERPL-MCNC: 1.97 MG/DL — HIGH (ref 0.5–1.3)
ELLIPTOCYTES BLD QL SMEAR: SLIGHT — SIGNIFICANT CHANGE UP
EOSINOPHIL # BLD AUTO: 0.16 K/UL — SIGNIFICANT CHANGE UP (ref 0–0.5)
EOSINOPHIL NFR BLD AUTO: 2.6 % — SIGNIFICANT CHANGE UP (ref 0–6)
GIANT PLATELETS BLD QL SMEAR: PRESENT — SIGNIFICANT CHANGE UP
GLUCOSE BLDC GLUCOMTR-MCNC: 151 MG/DL — HIGH (ref 70–99)
GLUCOSE BLDC GLUCOMTR-MCNC: 160 MG/DL — HIGH (ref 70–99)
GLUCOSE BLDC GLUCOMTR-MCNC: 163 MG/DL — HIGH (ref 70–99)
GLUCOSE BLDC GLUCOMTR-MCNC: 204 MG/DL — HIGH (ref 70–99)
GLUCOSE SERPL-MCNC: 182 MG/DL — HIGH (ref 70–99)
HCT VFR BLD CALC: 25.8 % — LOW (ref 39–50)
HGB BLD-MCNC: 7.8 G/DL — LOW (ref 13–17)
HYPOCHROMIA BLD QL: SLIGHT — SIGNIFICANT CHANGE UP
LYMPHOCYTES # BLD AUTO: 0.74 K/UL — LOW (ref 1–3.3)
LYMPHOCYTES # BLD AUTO: 12.2 % — LOW (ref 13–44)
MANUAL SMEAR VERIFICATION: SIGNIFICANT CHANGE UP
MCHC RBC-ENTMCNC: 19.9 PG — LOW (ref 27–34)
MCHC RBC-ENTMCNC: 30.2 GM/DL — LOW (ref 32–36)
MCV RBC AUTO: 66 FL — LOW (ref 80–100)
MICROCYTES BLD QL: SLIGHT — SIGNIFICANT CHANGE UP
MONOCYTES # BLD AUTO: 0.57 K/UL — SIGNIFICANT CHANGE UP (ref 0–0.9)
MONOCYTES NFR BLD AUTO: 9.5 % — SIGNIFICANT CHANGE UP (ref 2–14)
MYELOCYTES NFR BLD: 0.9 % — HIGH (ref 0–0)
NEUTROPHILS # BLD AUTO: 4.14 K/UL — SIGNIFICANT CHANGE UP (ref 1.8–7.4)
NEUTROPHILS NFR BLD AUTO: 68.7 % — SIGNIFICANT CHANGE UP (ref 43–77)
OVALOCYTES BLD QL SMEAR: SLIGHT — SIGNIFICANT CHANGE UP
PLAT MORPH BLD: NORMAL — SIGNIFICANT CHANGE UP
PLATELET # BLD AUTO: 336 K/UL — SIGNIFICANT CHANGE UP (ref 150–400)
POIKILOCYTOSIS BLD QL AUTO: SLIGHT — SIGNIFICANT CHANGE UP
POTASSIUM SERPL-MCNC: 5 MMOL/L — SIGNIFICANT CHANGE UP (ref 3.5–5.3)
POTASSIUM SERPL-SCNC: 5 MMOL/L — SIGNIFICANT CHANGE UP (ref 3.5–5.3)
PROT SERPL-MCNC: 7.3 G/DL — SIGNIFICANT CHANGE UP (ref 6.6–8.7)
RBC # BLD: 3.91 M/UL — LOW (ref 4.2–5.8)
RBC # FLD: 21.1 % — HIGH (ref 10.3–14.5)
RBC BLD AUTO: ABNORMAL
SMUDGE CELLS # BLD: PRESENT — SIGNIFICANT CHANGE UP
SODIUM SERPL-SCNC: 137 MMOL/L — SIGNIFICANT CHANGE UP (ref 135–145)
TARGETS BLD QL SMEAR: SLIGHT — SIGNIFICANT CHANGE UP
VARIANT LYMPHS # BLD: 5.2 % — SIGNIFICANT CHANGE UP (ref 0–6)
WBC # BLD: 6.03 K/UL — SIGNIFICANT CHANGE UP (ref 3.8–10.5)
WBC # FLD AUTO: 6.03 K/UL — SIGNIFICANT CHANGE UP (ref 3.8–10.5)

## 2021-04-25 PROCEDURE — 99233 SBSQ HOSP IP/OBS HIGH 50: CPT

## 2021-04-25 PROCEDURE — 99222 1ST HOSP IP/OBS MODERATE 55: CPT

## 2021-04-25 PROCEDURE — 99232 SBSQ HOSP IP/OBS MODERATE 35: CPT

## 2021-04-25 RX ORDER — SODIUM CHLORIDE 9 MG/ML
1000 INJECTION, SOLUTION INTRAVENOUS
Refills: 0 | Status: DISCONTINUED | OUTPATIENT
Start: 2021-04-25 | End: 2021-05-01

## 2021-04-25 RX ADMIN — MEROPENEM-VABORBACTAM 83.33 GRAM(S): 1; 1 INJECTION, POWDER, FOR SOLUTION INTRAVENOUS at 16:32

## 2021-04-25 RX ADMIN — Medication 4: at 11:58

## 2021-04-25 RX ADMIN — SODIUM CHLORIDE 3 MILLILITER(S): 9 INJECTION INTRAMUSCULAR; INTRAVENOUS; SUBCUTANEOUS at 21:27

## 2021-04-25 RX ADMIN — Medication 2: at 08:01

## 2021-04-25 RX ADMIN — SODIUM CHLORIDE 3 MILLILITER(S): 9 INJECTION INTRAMUSCULAR; INTRAVENOUS; SUBCUTANEOUS at 05:58

## 2021-04-25 RX ADMIN — SODIUM CHLORIDE 75 MILLILITER(S): 9 INJECTION, SOLUTION INTRAVENOUS at 12:01

## 2021-04-25 RX ADMIN — Medication 2: at 16:39

## 2021-04-25 RX ADMIN — HEPARIN SODIUM 5000 UNIT(S): 5000 INJECTION INTRAVENOUS; SUBCUTANEOUS at 05:58

## 2021-04-25 RX ADMIN — MEROPENEM-VABORBACTAM 83.33 GRAM(S): 1; 1 INJECTION, POWDER, FOR SOLUTION INTRAVENOUS at 05:57

## 2021-04-25 RX ADMIN — Medication 1 GRAM(S): at 05:58

## 2021-04-25 RX ADMIN — SODIUM CHLORIDE 3 MILLILITER(S): 9 INJECTION INTRAMUSCULAR; INTRAVENOUS; SUBCUTANEOUS at 15:12

## 2021-04-25 NOTE — PROGRESS NOTE ADULT - ASSESSMENT
71 y/o M with PMH urinary retention due to BPH requiring chronic bourne for past 3 years, chronic anemia, CKD Stage III, DM II, HTN, metastatic SB ca (not on chemo/rad), b/l cataracts legally blind presents to ED with generalized weakness, low grade temperature at home, with recent outpatient diagnosis of UTI that was being treated with Keflex. Last bourne change 4/16/21 due to leak, changed every month. Last CAUTI in March at [facility]. h/o frequent uti  Reports low grade fever and abdominal discomfort, typical of symptoms during uti. Here has been on meropenem, up until yesterday with slightly elevated temperature, patient continues with abdominal discomfort. No flank pain    UCX enterobacter CRE, (S) to vabomere  BCX (-)  per team bourne was exchanged in ED    suggest vabomere 2g every 8 hours IV x 7 days adjusted for renal function through 4/30/21  patient lives with daughter but states he might not be able to manage IV at home and also does not want to go to rehab  will remain at risk for recurrent uti      will f/u

## 2021-04-25 NOTE — PROGRESS NOTE ADULT - SUBJECTIVE AND OBJECTIVE BOX
[facility] Division of Hospital Medicine  Pa Brown [phone]    Chief Complaint:  Patient is a 70y old  Male who presents with a chief complaint of Catheter associated UTI (24 Apr 2021 12:17)      SUBJECTIVE / OVERNIGHT EVENTS:  Pt seen and examined at bedside. No acute events reported overnight. No new complaints.    Patient denies chest pain, SOB, abd pain, N/V, fever, chills, dysuria or any other complaints. All remainder ROS negative.     MEDICATIONS  (STANDING):  dextrose 40% Gel 15 Gram(s) Oral once  dextrose 5%. 1000 milliLiter(s) (50 mL/Hr) IV Continuous <Continuous>  dextrose 5%. 1000 milliLiter(s) (100 mL/Hr) IV Continuous <Continuous>  dextrose 50% Injectable 25 Gram(s) IV Push once  dextrose 50% Injectable 12.5 Gram(s) IV Push once  dextrose 50% Injectable 25 Gram(s) IV Push once  folic acid 1 milliGRAM(s) Oral daily  glucagon  Injectable 1 milliGRAM(s) IntraMuscular once  heparin   Injectable 5000 Unit(s) SubCutaneous every 12 hours  insulin lispro (ADMELOG) corrective regimen sliding scale   SubCutaneous three times a day before meals  insulin lispro (ADMELOG) corrective regimen sliding scale   SubCutaneous at bedtime  meropenem/vaborbactam IVPB 2 Gram(s) IV Intermittent every 8 hours  metoprolol tartrate 25 milliGRAM(s) Oral two times a day  multivitamin 1 Tablet(s) Oral daily  pantoprazole  Injectable 40 milliGRAM(s) IV Push daily  saccharomyces boulardii 250 milliGRAM(s) Oral two times a day  senna 2 Tablet(s) Oral daily  sodium bicarbonate 650 milliGRAM(s) Oral two times a day  sodium chloride 0.45%. 1000 milliLiter(s) (75 mL/Hr) IV Continuous <Continuous>  sodium chloride 0.9% lock flush 3 milliLiter(s) IV Push every 8 hours  sucralfate 1 Gram(s) Oral four times a day  tamsulosin 0.8 milliGRAM(s) Oral at bedtime    MEDICATIONS  (PRN):  ondansetron Injectable 4 milliGRAM(s) IV Push every 6 hours PRN Nausea        I&O's Summary    24 Apr 2021 07:01  -  25 Apr 2021 07:00  --------------------------------------------------------  IN: 0 mL / OUT: 600 mL / NET: -600 mL        PHYSICAL EXAM:  Vital Signs Last 24 Hrs  T(C): 36.4 (25 Apr 2021 05:56), Max: 36.8 (24 Apr 2021 11:05)  T(F): 97.5 (25 Apr 2021 05:56), Max: 98.2 (24 Apr 2021 11:05)  HR: 82 (25 Apr 2021 05:56) (82 - 92)  BP: 144/80 (25 Apr 2021 05:56) (144/80 - 159/90)  BP(mean): --  RR: 19 (25 Apr 2021 05:56) (17 - 19)  SpO2: 96% (25 Apr 2021 05:56) (94% - 98%)        CONSTITUTIONAL: NAD, well-developed, well-groomed  HEENT: Legally blind  RESPIRATORY: CTA bilaterally, normal effort  CARDIOVASCULAR: RRR, S1/S2+, no m/g/r  ABDOMEN: Nontender to palpation, normoactive bowel sounds, no rebound/guarding; No hepatosplenomegaly  MUSCLOSKELETAL: No edema, cyanosis or deformities.  PSYCH: A+O to person, place, and time; affect appropriate  NEUROLOGY: CN 2-12 are intact and symmetric; no gross neurological deficits.  SKIN: No rashes; no palpable lesions  VASC: Distal pulses palpable    LABS:                        7.8    6.03  )-----------( 336      ( 25 Apr 2021 09:56 )             25.8                     Culture - Urine (collected 23 Apr 2021 13:06)  Source: .Urine Catheterized  Final Report (24 Apr 2021 09:41):    No growth      CAPILLARY BLOOD GLUCOSE      POCT Blood Glucose.: 151 mg/dL (25 Apr 2021 07:51)  POCT Blood Glucose.: 165 mg/dL (24 Apr 2021 21:50)  POCT Blood Glucose.: 180 mg/dL (24 Apr 2021 16:49)  POCT Blood Glucose.: 191 mg/dL (24 Apr 2021 11:34)        RADIOLOGY & ADDITIONAL TESTS:  Results Reviewed:   Imaging Personally Reviewed:  Electrocardiogram Personally Reviewed:

## 2021-04-25 NOTE — PROGRESS NOTE ADULT - SUBJECTIVE AND OBJECTIVE BOX
[facility]  INFECTIOUS DISEASES AND INTERNAL MEDICINE at [place]  =======================================================  Jamel Horta MD  Diplomates American Board of Internal Medicine and Infectious Diseases  Tel: [phone]      Fax: [phone]  =======================================================    ANTOINETTE WILSON [id number]    Follow up: enriquetati  feels better  abdominal  pain has improved      Allergies:  amoxicillin (Rash)           REVIEW OF SYSTEMS:  CONSTITUTIONAL:  No Fever or chills  HEENT:   No diplopia or blurred vision.  No earache, sore throat or runny nose.  CARDIOVASCULAR:  No pressure, squeezing, strangling, tightness, heaviness or aching about the chest, neck, axilla or epigastrium.  RESPIRATORY:  No cough, shortness of breath  GASTROINTESTINAL:  No nausea, vomiting or diarrhea.  GENITOURINARY:  No dysuria, frequency or urgency. No Blood in urine  MUSCULOSKELETAL:  no joint aches, no muscle pain  SKIN:  No change in skin, hair or nails.  NEUROLOGIC:  No Headaches, seizures or weakness.  PSYCHIATRIC:  No disorder of thought or mood.  ENDOCRINE:  No heat or cold intolerance  HEMATOLOGICAL:  No easy bruising or bleeding.       Physical Exam:  GEN: NAD, pleasant, legally blind  HEENT: normocephalic and atraumatic. EOMI. PERRL.  Anicteric   NECK: Supple.   LUNGS: Clear to auscultation.  HEART: Regular rate and rhythm without murmur.  ABDOMEN: Soft, nontender, and nondistended.  Positive bowel sounds.    : No CVA tenderness bourne in place  EXTREMITIES: Without any edema.  MSK: no joint swelling  NEUROLOGIC: Cranial nerves II through XII are grossly intact. No focal deficits  PSYCHIATRIC: Appropriate affect .  SKIN: No Rash      Vitals:    T(F): 98.4 (25 Apr 2021 11:09), Max: 98.4 (25 Apr 2021 11:09)  HR: 76 (25 Apr 2021 11:09)  BP: 161/81 (25 Apr 2021 11:09)  RR: 18 (25 Apr 2021 11:09)  SpO2: 94% (25 Apr 2021 11:09) (94% - 98%)  temp max in last 48H T(F): , Max: 98.4 (04-25-21 @ 11:09)    Current Antibiotics:  meropenem/vaborbactam IVPB 2 Gram(s) IV Intermittent every 8 hours    Other medications:  dextrose 40% Gel 15 Gram(s) Oral once  dextrose 5%. 1000 milliLiter(s) IV Continuous <Continuous>  dextrose 5%. 1000 milliLiter(s) IV Continuous <Continuous>  dextrose 50% Injectable 25 Gram(s) IV Push once  dextrose 50% Injectable 12.5 Gram(s) IV Push once  dextrose 50% Injectable 25 Gram(s) IV Push once  folic acid 1 milliGRAM(s) Oral daily  glucagon  Injectable 1 milliGRAM(s) IntraMuscular once  heparin   Injectable 5000 Unit(s) SubCutaneous every 12 hours  insulin lispro (ADMELOG) corrective regimen sliding scale   SubCutaneous three times a day before meals  insulin lispro (ADMELOG) corrective regimen sliding scale   SubCutaneous at bedtime  metoprolol tartrate 25 milliGRAM(s) Oral two times a day  multivitamin 1 Tablet(s) Oral daily  pantoprazole  Injectable 40 milliGRAM(s) IV Push daily  saccharomyces boulardii 250 milliGRAM(s) Oral two times a day  senna 2 Tablet(s) Oral daily  sodium bicarbonate 650 milliGRAM(s) Oral two times a day  sodium chloride 0.45%. 1000 milliLiter(s) IV Continuous <Continuous>  sodium chloride 0.9% lock flush 3 milliLiter(s) IV Push every 8 hours  sucralfate 1 Gram(s) Oral four times a day  tamsulosin 0.8 milliGRAM(s) Oral at bedtime                            7.8    6.03  )-----------( 336      ( 25 Apr 2021 09:56 )             25.8     04-25    137  |  99  |  42.0<H>  ----------------------------<  182<H>  5.0   |  22.0  |  1.97<H>    Ca    8.8      25 Apr 2021 09:56    TPro  7.3  /  Alb  3.4  /  TBili  <0.2<L>  /  DBili  x   /  AST  33  /  ALT  14  /  AlkPhos  79  04-25    RECENT CULTURES:  04-23 @ 13:06 .Urine Catheterized     No growth        04-20 @ 00:43 .Urine Clean Catch (Midstream) Enterobacter cloacae (Carbapenem Resistant)    10,000 - 49,000 CFU/mL Enterobacter cloacae complex (Carbapenem Resistant)  Culture in progress        04-19 @ 16:33 .Blood Blood-Peripheral     No growth at 5 days.        04-19 @ 16:32 .Blood Blood-Peripheral     No growth at 5 days.        04-19 @ 16:30          NotDetec      WBC Count: 6.03 K/uL (04-25-21 @ 09:56)  WBC Count: 5.28 K/uL (04-23-21 @ 07:52)  WBC Count: 8.16 K/uL (04-21-21 @ 13:26)    Creatinine, Serum: 1.97 mg/dL (04-25-21 @ 09:56)  Creatinine, Serum: 2.08 mg/dL (04-23-21 @ 07:52)  Creatinine, Serum: 1.77 mg/dL (04-21-21 @ 13:26)      Ferritin, Serum: 73 ng/mL (04-20-21 @ 06:54)         SARS-CoV-2: NotDetec (04-19-21 @ 16:30)  Rapid RVP Result: NotDetec (04-19-21 @ 16:30)

## 2021-04-25 NOTE — PROGRESS NOTE ADULT - ASSESSMENT
HARLEEN on CKD 3b  UTI  Chronic Warren  HTN  Anemia CKD    -BLCr 1.7  -HARLEEN unclear etiology, creatinine stablizing  -Check Urine lytes  -CT abd -Moderate bilateral perinephric stranding. Left renal upper pole proteinaceous cyst and pole simple cyst. Mildly atrophic right kidney. No evidence of hydronephrosis or stone bilaterally.  -Abx per ID, currently on vabomere  -Gentle IVF  -Check Iron studies   -BP controlled  -Cont Sodium bicarb oral

## 2021-04-25 NOTE — PROGRESS NOTE ADULT - ASSESSMENT
69 y/o M with PMHx urinary retention due to BPH requiring chronic bourne for past 3 years, chronic anemia, CKD Stage III, DM II, HTN, metastatic SB ca (not on chemo/rad), b/l cataracts legally blind presents to ED with generalized weakness, low grade temperature at home, with recent outpatient diagnosis of UTI that was being treated with Keflex.     #Catheter associated UTI  - Chronic bourne was replaced in ED - pyuria on placement  - In ED received Cefepime and Vanco IV. Rocephin IV changed to Meropenem due to preliminary sensitivity reading of MDRO.    - Prelim urine culture + for Enterobacter cloacae  - all other cultures negative  - Repeat UCx: No growth.  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 Lew office via [facility]    #Essential HTN  - Metoprolol 25mg oral BID    # DM II  -Patient on SS   -Glucose controlled on current regimen   -Elevated A1c - 7.4    #HARLEEN on CKD III  - Baseline GFR 30-59  - Bourne in place   - Nephro following  - Monitor BMP    #Chronic anemia  -Baseline hemoglobin 8-9  -Patient hemodynamically stable    #Legally Blind   -b/l cataracts   -uses cane at baseline  -Lives at home with daughter Amelia     #DVT ppx: Heparin 5000U SQ q12  #GI ppx: Protonix 40mg oral before breakfast  #Diet: Diet to be changed to soft w/stated intolerance to starch   #Code Status: Full Code. Pallaitive consulted for assistance w/ GOC.  #Disposition: Pending antibiotic duration.    Attempted to call daughter, no answer. VM full.

## 2021-04-25 NOTE — CONSULT NOTE ADULT - ASSESSMENT
69 y/o M with PMH urinary retention due to BPH requiring chronic bourne for past 3 years, chronic anemia, CKD Stage III, DM II, HTN, metastatic SB ca (not on chemo/rad), b/l cataracts legally blind presents to ED with generalized weakness, low grade temperature at home, with recent outpatient diagnosis of UTI that was being treated with Keflex. Last bourne change 4/16/21 due to leak, changed every month. Last CAUTI in March at [facility]. h/o frequent uti  Reports low grade fever and abdominal discomfort, typical of symptoms during uti. Here has been on meropenem, up until yesterday with slightly elevated temperature, patient continues with abdominal discomfort. No flank pain  UCX enterobacter CRE, BCX (-)  per team bourne was exchanged in ED  Asked core lab to check (S) to avycaz and vabomere  Dc meroepenem-->vabomere  repeat ucx sent will f/u      d/w team, pharmacy      
HARLEEN on CKD 3b  UTI  Chronic Warren  HTN  Anemia CKD    -BLCr 1.7  -HARLEEN unclear etiology  -Check Urine lytes  -CT abd -Moderate bilateral perinephric stranding. Left renal upper pole proteinaceous cyst and pole simple cyst. Mildly atrophic right kidney. No evidence of hydronephrosis or stone bilaterally.  -Abx per ID, currently on vabomere  -Gentle IVF x 16 hours  -Check Iron studies   -BP controlled  -Cont Sodium bicarb oral 
71 y/o M with PMHx urinary retention due to BPH requiring chronic bourne for past 3 years, chronic anemia, CKD Stage III, DM II, HTN, metastatic SB ca (not on chemo/rad), b/l cataracts legally blind presents to ED with generalized weakness, low grade temperature at home, with recent outpatient diagnosis of UTI that was being treated with Keflex.     #Catheter associated UTI  - Chronic bourne was replaced in ED - pyuria on placement  - Prelim urine culture + for Enterobacter cloacae  - Repeat UCx: No growth.  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 He is not interested any surgical or cancer directed treatments.   - pt is resistant to discuss GOC or hospice care.     #HARLEEN on CKD III  - Baseline GFR 30-59  - Bourne in place   - Nephro following    #Chronic anemia  -Baseline hemoglobin 8-9  -Patient hemodynamically stable    #Legally Blind   -b/l cataracts   -uses cane at baseline  -Lives at home with daughter Amelia. He stated she's HCP.

## 2021-04-25 NOTE — PROGRESS NOTE ADULT - SUBJECTIVE AND OBJECTIVE BOX
Patient is a 70y old  Male who presents with a chief complaint of Catheter associated UTI (24 Apr 2021 11:36)       HPI:  71 y/o male with history of urinary retention due to BPH with chronic indwelling bourne x 3 years, states last changed 3 days ago after it was leaking, HTN, HLD, legally blind, CKD-4 who lives at home with help from family for ADLs. He uses a walker at baseline. He was brought in for feeling generalized weakness earlier today and felt like he was going to pass out. Denies LOC, or falls. No CP, SOB, N/V. He was noted to have low grade temp at home and in ED was 100.6 orally. U/A off new bourne placed in ED with pyuria. No hypotension, no signs of new end organ damage. He was given Cefepime and Vancomycin. Currently feels back to baseline.  (19 Apr 2021 23:19) Was found to have HARLEEN by KDIGO criteria prompting renal consult.  PAtient sees Dr. Carr Outpatient.  Bourne catheter was changed in ER.  Has abdominal pain.  Denies any N/V/Diarrhea.      Eating, still has some abd discomfot    PAST MEDICAL & SURGICAL HISTORY:  Diabetes    Chronic anemia  baseilne Hbg 8-9    CKD (chronic kidney disease) stage 3, GFR 30-59 ml/min    Other secondary cataract of both eyes  legally blind    Urinary retention  chronic bourne    HTN (hypertension)    No significant past surgical history         FAMILY HISTORY:  Family history of diabetes mellitus (Mother)    NC    Social History:Non smoker    MEDICATIONS  (STANDING):  dextrose 40% Gel 15 Gram(s) Oral once  dextrose 5%. 1000 milliLiter(s) (50 mL/Hr) IV Continuous <Continuous>  dextrose 5%. 1000 milliLiter(s) (100 mL/Hr) IV Continuous <Continuous>  dextrose 50% Injectable 25 Gram(s) IV Push once  dextrose 50% Injectable 12.5 Gram(s) IV Push once  dextrose 50% Injectable 25 Gram(s) IV Push once  folic acid 1 milliGRAM(s) Oral daily  glucagon  Injectable 1 milliGRAM(s) IntraMuscular once  heparin   Injectable 5000 Unit(s) SubCutaneous every 12 hours  insulin lispro (ADMELOG) corrective regimen sliding scale   SubCutaneous three times a day before meals  insulin lispro (ADMELOG) corrective regimen sliding scale   SubCutaneous at bedtime  meropenem/vaborbactam IVPB 2 Gram(s) IV Intermittent every 8 hours  metoprolol tartrate 25 milliGRAM(s) Oral two times a day  multivitamin 1 Tablet(s) Oral daily  pantoprazole  Injectable 40 milliGRAM(s) IV Push daily  saccharomyces boulardii 250 milliGRAM(s) Oral two times a day  senna 2 Tablet(s) Oral daily  sodium bicarbonate 650 milliGRAM(s) Oral two times a day  sodium chloride 0.9% lock flush 3 milliLiter(s) IV Push every 8 hours  sucralfate 1 Gram(s) Oral four times a day  tamsulosin 0.8 milliGRAM(s) Oral at bedtime    MEDICATIONS  (PRN):  ondansetron Injectable 4 milliGRAM(s) IV Push every 6 hours PRN Nausea   Meds reviewed    Allergies    amoxicillin (Rash)    Intolerances         REVIEW OF SYSTEMS:    Review of Systems:   Constitutional: Denies fatigue  HEENT: Denies headaches and dizziness  Respiratory: denies SOB, cough, or wheezing  Cardiovascular: denies CP, palpitations  Gastrointestinal: Denies nausea, denies vomiting, diarrhea, constipation, + abdominal pain  Genitourinary: denies painful urination, increased frequency, urgency, or bloody urine  Skin: denies rashes or itching  Musculoskeletal: denies muscle aches, joint swelling  Neurologic: Denies generalized weakness, denies loss of sensation, numbness, or tingling      VICU Vital Signs Last 24 Hrs  T(C): 36.4 (25 Apr 2021 05:56), Max: 36.7 (24 Apr 2021 17:41)  T(F): 97.5 (25 Apr 2021 05:56), Max: 98.1 (24 Apr 2021 17:41)  HR: 82 (25 Apr 2021 05:56) (82 - 92)  BP: 144/80 (25 Apr 2021 05:56) (144/80 - 159/90)  BP(mean): --  ABP: --  ABP(mean): --  RR: 19 (25 Apr 2021 05:56) (17 - 19)  SpO2: 96% (25 Apr 2021 05:56) (96% - 98%)    Daily     PHYSICAL EXAM:    GENERAL: NAD  HEAD:  Atraumatic, Normocephalic  EYES: Blind  ENMT: No Drainage from nares, No drainage from ears  NECK: Supple, neck  veins full  NERVOUS SYSTEM:  Awake and Alert  CHEST/LUNG: Clear to percussion bilaterally; No rales, rhonchi, wheezing, or rubs  HEART: Regular rate and rhythm; No murmurs, rubs, or gallops  ABDOMEN: Soft, Nontender, Nondistended; Bowel sounds present  EXTREMITIES:  No Edema  SKIN: No rashes No obvious ecchymosis      LABS:                                   7.8    6.03  )-----------( 336      ( 25 Apr 2021 09:56 )             25.8     04-25    137  |  99  |  42.0<H>  ----------------------------<  182<H>  5.0   |  22.0  |  1.97<H>    Ca    8.8      25 Apr 2021 09:56    TPro  7.3  /  Alb  3.4  /  TBili  <0.2<L>  /  DBili  x   /  AST  33  /  ALT  14  /  AlkPhos  79  04-25                    RADIOLOGY & ADDITIONAL TESTS:

## 2021-04-25 NOTE — CONSULT NOTE ADULT - SUBJECTIVE AND OBJECTIVE BOX
HPI:  71 y/o male with history of urinary retention due to BPH with chronic indwelling bourne x 3 years, states last changed 3 days ago after it was leaking, HTN, HLD, legally blind, CKD-4 who lives at home with help from family for ADLs. He uses a walker at baseline. He was brought in for feeling generalized weakness earlier today and felt like he was going to pass out. Denies LOC, or falls. No CP, SOB, N/V. He was noted to have low grade temp at home and in ED was 100.6 orally. U/A off new bourne placed in ED with pyuria. No hypotension, no signs of new end organ damage. He was given Cefepime and Vancomycin. Currently feels back to baseline.  (19 Apr 2021 23:19)    PERTINENT PM/SXH:   Diabetes    Chronic anemia    CKD (chronic kidney disease) stage 3, GFR 30-59 ml/min    Other secondary cataract of both eyes    Urinary retention    HTN (hypertension)      No significant past surgical history      FAMILY HISTORY:  Family history of diabetes mellitus (Mother)      ITEMS NOT CHECKED ARE NOT PRESENT    SOCIAL HISTORY:   Significant other/partner[ ]  Children[ ]  Amish/Spirituality:  Substance hx:  [ ]   Tobacco hx:  [ ]   Alcohol hx: [ ]   Home Opioid hx:  [ ] I-Stop Reference No:  Living Situation: [ ]Home  [ ]Long term care  [ ]Rehab [ ]Other    ADVANCE DIRECTIVES:    DNR  MOLST  [ ]  Living Will  [ ]   DECISION MAKER(s):  [ ] Health Care Proxy(s)  [ ] Surrogate(s)  [ ] Guardian           Name(s): Phone Number(s):    BASELINE (I)ADL(s) (prior to admission):  [place]: [ ]Total  [ ] Moderate [ ]Dependent    Allergies    amoxicillin (Rash)    Intolerances    MEDICATIONS  (STANDING):  dextrose 40% Gel 15 Gram(s) Oral once  dextrose 5%. 1000 milliLiter(s) (50 mL/Hr) IV Continuous <Continuous>  dextrose 5%. 1000 milliLiter(s) (100 mL/Hr) IV Continuous <Continuous>  dextrose 50% Injectable 25 Gram(s) IV Push once  dextrose 50% Injectable 12.5 Gram(s) IV Push once  dextrose 50% Injectable 25 Gram(s) IV Push once  folic acid 1 milliGRAM(s) Oral daily  glucagon  Injectable 1 milliGRAM(s) IntraMuscular once  heparin   Injectable 5000 Unit(s) SubCutaneous every 12 hours  insulin lispro (ADMELOG) corrective regimen sliding scale   SubCutaneous three times a day before meals  insulin lispro (ADMELOG) corrective regimen sliding scale   SubCutaneous at bedtime  meropenem/vaborbactam IVPB 2 Gram(s) IV Intermittent every 8 hours  metoprolol tartrate 25 milliGRAM(s) Oral two times a day  multivitamin 1 Tablet(s) Oral daily  pantoprazole  Injectable 40 milliGRAM(s) IV Push daily  saccharomyces boulardii 250 milliGRAM(s) Oral two times a day  senna 2 Tablet(s) Oral daily  sodium bicarbonate 650 milliGRAM(s) Oral two times a day  sodium chloride 0.45%. 1000 milliLiter(s) (75 mL/Hr) IV Continuous <Continuous>  sodium chloride 0.9% lock flush 3 milliLiter(s) IV Push every 8 hours  sucralfate 1 Gram(s) Oral four times a day  tamsulosin 0.8 milliGRAM(s) Oral at bedtime    MEDICATIONS  (PRN):  ondansetron Injectable 4 milliGRAM(s) IV Push every 6 hours PRN Nausea    PRESENT SYMPTOMS: [ ]Unable to obtain due to poor mentation   Source if other than patient:  [ ]Family   [ ]Team     Pain: [ ]yes [ ]no  QOL impact -   Location -                    Aggravating factors -  Quality -  Radiation -  Timing-  Severity (0-10 scale):  Minimal acceptable level (0-10 scale):     CPOT:    https://www.[facility].org/getattachment/[email or web address]/Critical-Care-Pain-Observation-Tool-(CPOT)      PAIN AD Score:     http://geriatrictoolkit.[place].[email or web address]/cog/painad.pdf (press ctrl +  left click to view)    Dyspnea:                           [ ]Mild [ ]Moderate [ ]Severe  Anxiety:                             [ ]Mild [ ]Moderate [ ]Severe  Fatigue:                             [ ]Mild [ ]Moderate [ ]Severe  Nausea:                             [ ]Mild [ ]Moderate [ ]Severe  Loss of appetite:              [ ]Mild [ ]Moderate [ ]Severe  Constipation:                    [ ]Mild [ ]Moderate [ ]Severe    Other Symptoms:  [ ]All other review of systems negative     Palliative Performance Status Version 2:         %    http://npcrc.org/files/news/palliative_performance_scale_ppsv2.pdf  PHYSICAL EXAM:  Vital Signs Last 24 Hrs  T(C): 36.9 (25 Apr 2021 11:09), Max: 36.9 (25 Apr 2021 11:09)  T(F): 98.4 (25 Apr 2021 11:09), Max: 98.4 (25 Apr 2021 11:09)  HR: 76 (25 Apr 2021 11:09) (76 - 92)  BP: 161/81 (25 Apr 2021 11:09) (144/80 - 161/81)  BP(mean): --  RR: 18 (25 Apr 2021 11:09) (17 - 19)  SpO2: 94% (25 Apr 2021 11:09) (94% - 98%) I&O's Summary    24 Apr 2021 07:01  -  25 Apr 2021 07:00  --------------------------------------------------------  IN: 0 mL / OUT: 600 mL / NET: -600 mL      GENERAL:  [ ]Alert  [ ]Oriented x   [ ]Lethargic  [ ]Cachexia  [ ]Unarousable  [ ]Verbal  [ ]Non-Verbal  Behavioral:   [ ] Anxiety  [ ] Delirium [ ] Agitation [ ] Other  HEENT:  [ ]Normal   [ ]Dry mouth   [ ]ET Tube/Trach  [ ]Oral lesions  PULMONARY:   [ ]Clear [ ]Tachypnea  [ ]Audible excessive secretions   [ ]Rhonchi        [ ]Right [ ]Left [ ]Bilateral  [ ]Crackles        [ ]Right [ ]Left [ ]Bilateral  [ ]Wheezing     [ ]Right [ ]Left [ ]Bilateral  [ ]Diminished breath sounds [ ]right [ ]left [ ]bilateral  CARDIOVASCULAR:    [ ]Regular [ ]Irregular [ ]Tachy  [ ]Maxwell [ ]Murmur [ ]Other  GASTROINTESTINAL:  [ ]Soft  [ ]Distended   [ ]+BS  [ ]Non tender [ ]Tender  [ ]PEG [ ]OGT/ NGT  Last BM:     GENITOURINARY:  [ ]Normal [ ] Incontinent   [ ]Oliguria/Anuria   [ ]Bourne  MUSCULOSKELETAL:   [ ]Normal   [ ]Weakness  [ ]Bed/Wheelchair bound [ ]Edema  NEUROLOGIC:   [ ]No focal deficits  [ ]Cognitive impairment  [ ]Dysphagia [ ]Dysarthria [ ]Paresis [ ]Other   SKIN:   [ ]Normal    [ ]Rash  [ ]Pressure ulcer(s)       Present on admission [ ]y [ ]n    CRITICAL CARE:  [ ] Shock Present  [ ]Septic [ ]Cardiogenic [ ]Neurologic [ ]Hypovolemic  [ ]  Vasopressors [ ]  Inotropes   [ ]Respiratory failure present [ ]Mechanical ventilation [ ]Non-invasive ventilatory support [ ]High flow    [ ]Acute  [ ]Chronic [ ]Hypoxic  [ ]Hypercarbic [ ]Other  [ ]Other organ failure     LABS:                        7.8    6.03  )-----------( 336      ( 25 Apr 2021 09:56 )             25.8   04-25    137  |  99  |  42.0<H>  ----------------------------<  182<H>  5.0   |  22.0  |  1.97<H>    Ca    8.8      25 Apr 2021 09:56    TPro  7.3  /  Alb  3.4  /  TBili  <0.2<L>  /  DBili  x   /  AST  33  /  ALT  14  /  AlkPhos  79  04-25        RADIOLOGY & ADDITIONAL STUDIES:    PROTEIN CALORIE MALNUTRITION PRESENT: [ ]mild [ ]moderate [ ]severe [ ]underweight [ ]morbid obesity  https://www.andeal.org/vault/2440/web/files/ONC/Table_Clinical%20Characteristics%20to%20Document%20Malnutrition-White%20JV%20et%20al%202012.pdf    Height (cm): 180.3 (04-19-21 @ 15:51), 180.34 (09-06-20 @ 13:26)  Weight (kg): 102.1 (04-19-21 @ 15:51), 110.2 (09-06-20 @ 13:26)  BMI (kg/m2): 31.4 (04-19-21 @ 15:51), 33.9 (09-06-20 @ 13:26)    [ ]PPSV2 < or = to 30% [ ]significant weight loss  [ ]poor nutritional intake  [ ]anasarca     Albumin, Serum: 3.4 g/dL (04-25-21 @ 09:56)   [ ]Artificial Nutrition      REFERRALS:   [ ]Chaplaincy  [ ]Hospice  [ ]Child Life  [ ]Social Work  [ ]Case management [ ]Holistic Therapy     Goals of Care Document:  HPI:  71 y/o male with history of urinary retention due to BPH with chronic indwelling bourne x 3 years, states last changed 3 days ago after it was leaking, HTN, HLD, legally blind, CKD-4 who lives at home with help from family for ADLs. He uses a walker at baseline. He was brought in for feeling generalized weakness earlier today and felt like he was going to pass out. Denies LOC, or falls. No CP, SOB, N/V. He was noted to have low grade temp at home and in ED was 100.6 orally. U/A off new bourne placed in ED with pyuria. No hypotension, no signs of new end organ damage. He was given Cefepime and Vancomycin. Currently feels back to baseline.  (19 Apr 2021 23:19)    PERTINENT PM/SXH:   Diabetes    Chronic anemia    CKD (chronic kidney disease) stage 3, GFR 30-59 ml/min    Other secondary cataract of both eyes    Urinary retention    HTN (hypertension)      No significant past surgical history      FAMILY HISTORY:  Family history of diabetes mellitus (Mother)      ITEMS NOT CHECKED ARE NOT PRESENT    SOCIAL HISTORY:   Significant other/partner[ ]  Children[ x]  Nondenominational/Spirituality: Anabaptist  Substance hx:  [ ]   Tobacco hx:  [ ]   Alcohol hx: [ ]   Home Opioid hx:  [ ] I-Stop Reference No:  Living Situation: [x ]Home  [ ]Long term care  [ ]Rehab [ ]Other    ADVANCE DIRECTIVES:    DNR  MOLST  [ ]  Living Will  [ ]   DECISION MAKER(s):  [x ] Health Care Proxy(s)  [ ] Surrogate(s)  [ ] Guardian           Name(s): Breanne Cisneros  Phone Number(s):    BASELINE (I)ADL(s) (prior to admission):  [place]: [ ]Total  [ ] Moderate [ x]Dependent    Allergies    amoxicillin (Rash)    Intolerances    MEDICATIONS  (STANDING):  dextrose 40% Gel 15 Gram(s) Oral once  dextrose 5%. 1000 milliLiter(s) (50 mL/Hr) IV Continuous <Continuous>  dextrose 5%. 1000 milliLiter(s) (100 mL/Hr) IV Continuous <Continuous>  dextrose 50% Injectable 25 Gram(s) IV Push once  dextrose 50% Injectable 12.5 Gram(s) IV Push once  dextrose 50% Injectable 25 Gram(s) IV Push once  folic acid 1 milliGRAM(s) Oral daily  glucagon  Injectable 1 milliGRAM(s) IntraMuscular once  heparin   Injectable 5000 Unit(s) SubCutaneous every 12 hours  insulin lispro (ADMELOG) corrective regimen sliding scale   SubCutaneous three times a day before meals  insulin lispro (ADMELOG) corrective regimen sliding scale   SubCutaneous at bedtime  meropenem/vaborbactam IVPB 2 Gram(s) IV Intermittent every 8 hours  metoprolol tartrate 25 milliGRAM(s) Oral two times a day  multivitamin 1 Tablet(s) Oral daily  pantoprazole  Injectable 40 milliGRAM(s) IV Push daily  saccharomyces boulardii 250 milliGRAM(s) Oral two times a day  senna 2 Tablet(s) Oral daily  sodium bicarbonate 650 milliGRAM(s) Oral two times a day  sodium chloride 0.45%. 1000 milliLiter(s) (75 mL/Hr) IV Continuous <Continuous>  sodium chloride 0.9% lock flush 3 milliLiter(s) IV Push every 8 hours  sucralfate 1 Gram(s) Oral four times a day  tamsulosin 0.8 milliGRAM(s) Oral at bedtime    MEDICATIONS  (PRN):  ondansetron Injectable 4 milliGRAM(s) IV Push every 6 hours PRN Nausea    PRESENT SYMPTOMS: [ ]Unable to obtain due to poor mentation   Source if other than patient:  [ ]Family   [ ]Team     Pain: [ ]yes [x ]no  QOL impact -   Location -                    Aggravating factors -  Quality -  Radiation -  Timing-  Severity (0-10 scale):  Minimal acceptable level (0-10 scale):     CPOT:    https://www.[facility].org/getattachment/[email or web address]-[email or web address]-4001k3919m5u/Critical-Care-Pain-Observation-Tool-(CPOT)      PAIN AD Score:     http://geriatrictoolkit.[place].[facility]/cog/painad.pdf (press ctrl +  left click to view)    Dyspnea:                           [ ]Mild [ ]Moderate [ ]Severe  Anxiety:                             [ ]Mild [ ]Moderate [ ]Severe  Fatigue:                             [ ]Mild [ ]Moderate [ ]Severe  Nausea:                             [ ]Mild [ ]Moderate [ ]Severe  Loss of appetite:              [ ]Mild [ ]Moderate [ ]Severe  Constipation:                    [ ]Mild [ ]Moderate [ ]Severe    Other Symptoms:  [x ]All other review of systems negative     Palliative Performance Status Version 2:        60 %    http://npcrc.org/files/news/palliative_performance_scale_ppsv2.pdf  PHYSICAL EXAM:  Vital Signs Last 24 Hrs  T(C): 36.9 (25 Apr 2021 11:09), Max: 36.9 (25 Apr 2021 11:09)  T(F): 98.4 (25 Apr 2021 11:09), Max: 98.4 (25 Apr 2021 11:09)  HR: 76 (25 Apr 2021 11:09) (76 - 92)  BP: 161/81 (25 Apr 2021 11:09) (144/80 - 161/81)  BP(mean): --  RR: 18 (25 Apr 2021 11:09) (17 - 19)  SpO2: 94% (25 Apr 2021 11:09) (94% - 98%) I&O's Summary    24 Apr 2021 07:01  -  25 Apr 2021 07:00  --------------------------------------------------------  IN: 0 mL / OUT: 600 mL / NET: -600 mL    CONSTITUTIONAL: NAD, well-developed, well-groomed  HEENT: Legally blind  RESPIRATORY: CTA bilaterally, normal effort  CARDIOVASCULAR: RRR, S1/S2+, no m/g/r  ABDOMEN: Nontender to palpation, normoactive bowel sounds, no rebound/guarding; No hepatosplenomegaly  MUSCLOSKELETAL: No edema, cyanosis or deformities.  PSYCH: A+O to person, place, and time; affect appropriate  NEUROLOGY: CN 2-12 are intact and symmetric; no gross neurological deficits.  SKIN: No rashes; no palpable lesions  VASC: Distal pulses palpable    LABS:                        7.8    6.03  )-----------( 336      ( 25 Apr 2021 09:56 )             25.8   04-25    137  |  99  |  42.0<H>  ----------------------------<  182<H>  5.0   |  22.0  |  1.97<H>    Ca    8.8      25 Apr 2021 09:56    TPro  7.3  /  Alb  3.4  /  TBili  <0.2<L>  /  DBili  x   /  AST  33  /  ALT  14  /  AlkPhos  79  04-25        RADIOLOGY & ADDITIONAL STUDIES: reviewed    PROTEIN CALORIE MALNUTRITION PRESENT: [ ]mild [ ]moderate [ ]severe [ ]underweight [ ]morbid obesity  https://www.andeal.org/vault/2440/web/files/ONC/Table_Clinical%20Characteristics%20to%20Document%20Malnutrition-White%20JV%20et%20al%202012.pdf    Height (cm): 180.3 (04-19-21 @ 15:51), 180.34 (09-06-20 @ 13:26)  Weight (kg): 102.1 (04-19-21 @ 15:51), 110.2 (09-06-20 @ 13:26)  BMI (kg/m2): 31.4 (04-19-21 @ 15:51), 33.9 (09-06-20 @ 13:26)    [ ]PPSV2 < or = to 30% [ ]significant weight loss  [ ]poor nutritional intake  [ ]anasarca     Albumin, Serum: 3.4 g/dL (04-25-21 @ 09:56)   [ ]Artificial Nutrition      REFERRALS:   [ ]Chaplaincy  [ ]Hospice  [ ]Child Life  [ ]Social Work  [ ]Case management [ ]Holistic Therapy     Goals of Care Document:

## 2021-04-25 NOTE — CONSULT NOTE ADULT - CONVERSATION DETAILS
Reviewed patient's medical and social history as well as events leading to patient's hospitalization. 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 Inquired about patient's wishes regarding extent of medical care to be provided. Patient stated that "everything has been taken care of". He is not interested in any surgical or cancer directed treatments. He doesn't believe the cancer is going to progress and is not going to cause him any harm. Writer gently provided anticipatory guidance and education regarding disease trajectory. Explained likelihood of recurrent infections and debility i/s/o stage 4 cancer with recurrent hospitalization. Recommended to consider home hospice care. Pt is was resistant to discuss GOC or hospice care as he believes he is doing well and there is no problems to arise. Patient was going through denial stage of grief. Psychosocial support provided.

## 2021-04-26 LAB
ALBUMIN SERPL ELPH-MCNC: 3.3 G/DL — SIGNIFICANT CHANGE UP (ref 3.3–5.2)
ALP SERPL-CCNC: 80 U/L — SIGNIFICANT CHANGE UP (ref 40–120)
ALT FLD-CCNC: 17 U/L — SIGNIFICANT CHANGE UP
ANION GAP SERPL CALC-SCNC: 14 MMOL/L — SIGNIFICANT CHANGE UP (ref 5–17)
AST SERPL-CCNC: 40 U/L — HIGH
BILIRUB SERPL-MCNC: 0.2 MG/DL — LOW (ref 0.4–2)
BUN SERPL-MCNC: 40 MG/DL — HIGH (ref 8–20)
CALCIUM SERPL-MCNC: 9.1 MG/DL — SIGNIFICANT CHANGE UP (ref 8.6–10.2)
CHLORIDE SERPL-SCNC: 100 MMOL/L — SIGNIFICANT CHANGE UP (ref 98–107)
CO2 SERPL-SCNC: 22 MMOL/L — SIGNIFICANT CHANGE UP (ref 22–29)
CREAT ?TM UR-MCNC: 46 MG/DL — SIGNIFICANT CHANGE UP
CREAT SERPL-MCNC: 1.95 MG/DL — HIGH (ref 0.5–1.3)
CULTURE RESULTS: SIGNIFICANT CHANGE UP
EOSINOPHIL NFR URNS MANUAL: PRESENT
GLUCOSE BLDC GLUCOMTR-MCNC: 126 MG/DL — HIGH (ref 70–99)
GLUCOSE BLDC GLUCOMTR-MCNC: 136 MG/DL — HIGH (ref 70–99)
GLUCOSE BLDC GLUCOMTR-MCNC: 142 MG/DL — HIGH (ref 70–99)
GLUCOSE BLDC GLUCOMTR-MCNC: 200 MG/DL — HIGH (ref 70–99)
GLUCOSE SERPL-MCNC: 123 MG/DL — HIGH (ref 70–99)
HCT VFR BLD CALC: 26 % — LOW (ref 39–50)
HGB BLD-MCNC: 8 G/DL — LOW (ref 13–17)
MCHC RBC-ENTMCNC: 20 PG — LOW (ref 27–34)
MCHC RBC-ENTMCNC: 30.8 GM/DL — LOW (ref 32–36)
MCV RBC AUTO: 65 FL — LOW (ref 80–100)
PLATELET # BLD AUTO: 336 K/UL — SIGNIFICANT CHANGE UP (ref 150–400)
POTASSIUM SERPL-MCNC: 5.4 MMOL/L — HIGH (ref 3.5–5.3)
POTASSIUM SERPL-SCNC: 5.4 MMOL/L — HIGH (ref 3.5–5.3)
PROT SERPL-MCNC: 7.4 G/DL — SIGNIFICANT CHANGE UP (ref 6.6–8.7)
RBC # BLD: 4 M/UL — LOW (ref 4.2–5.8)
RBC # FLD: 21.3 % — HIGH (ref 10.3–14.5)
SODIUM SERPL-SCNC: 136 MMOL/L — SIGNIFICANT CHANGE UP (ref 135–145)
SODIUM UR-SCNC: 77 MMOL/L — SIGNIFICANT CHANGE UP
UUN UR-MCNC: 382 MG/DL — SIGNIFICANT CHANGE UP
WBC # BLD: 6.53 K/UL — SIGNIFICANT CHANGE UP (ref 3.8–10.5)
WBC # FLD AUTO: 6.53 K/UL — SIGNIFICANT CHANGE UP (ref 3.8–10.5)

## 2021-04-26 PROCEDURE — 99232 SBSQ HOSP IP/OBS MODERATE 35: CPT

## 2021-04-26 PROCEDURE — 76937 US GUIDE VASCULAR ACCESS: CPT | Mod: 26,59

## 2021-04-26 PROCEDURE — 76942 ECHO GUIDE FOR BIOPSY: CPT | Mod: 26,59

## 2021-04-26 PROCEDURE — 36556 INSERT NON-TUNNEL CV CATH: CPT

## 2021-04-26 PROCEDURE — 99233 SBSQ HOSP IP/OBS HIGH 50: CPT

## 2021-04-26 PROCEDURE — 99497 ADVNCD CARE PLAN 30 MIN: CPT

## 2021-04-26 RX ORDER — SODIUM ZIRCONIUM CYCLOSILICATE 10 G/10G
5 POWDER, FOR SUSPENSION ORAL ONCE
Refills: 0 | Status: COMPLETED | OUTPATIENT
Start: 2021-04-26 | End: 2021-04-26

## 2021-04-26 RX ORDER — SODIUM CHLORIDE 9 MG/ML
1000 INJECTION, SOLUTION INTRAVENOUS
Refills: 0 | Status: DISCONTINUED | OUTPATIENT
Start: 2021-04-26 | End: 2021-05-01

## 2021-04-26 RX ADMIN — MEROPENEM-VABORBACTAM 83.33 GRAM(S): 1; 1 INJECTION, POWDER, FOR SOLUTION INTRAVENOUS at 09:36

## 2021-04-26 RX ADMIN — SODIUM CHLORIDE 3 MILLILITER(S): 9 INJECTION INTRAMUSCULAR; INTRAVENOUS; SUBCUTANEOUS at 15:34

## 2021-04-26 RX ADMIN — SODIUM CHLORIDE 75 MILLILITER(S): 9 INJECTION, SOLUTION INTRAVENOUS at 17:00

## 2021-04-26 RX ADMIN — MEROPENEM-VABORBACTAM 83.33 GRAM(S): 1; 1 INJECTION, POWDER, FOR SOLUTION INTRAVENOUS at 17:55

## 2021-04-26 RX ADMIN — Medication 1 GRAM(S): at 05:58

## 2021-04-26 RX ADMIN — Medication 2: at 11:47

## 2021-04-26 RX ADMIN — PANTOPRAZOLE SODIUM 40 MILLIGRAM(S): 20 TABLET, DELAYED RELEASE ORAL at 11:38

## 2021-04-26 RX ADMIN — HEPARIN SODIUM 5000 UNIT(S): 5000 INJECTION INTRAVENOUS; SUBCUTANEOUS at 05:58

## 2021-04-26 RX ADMIN — SODIUM CHLORIDE 3 MILLILITER(S): 9 INJECTION INTRAMUSCULAR; INTRAVENOUS; SUBCUTANEOUS at 21:17

## 2021-04-26 RX ADMIN — MEROPENEM-VABORBACTAM 83.33 GRAM(S): 1; 1 INJECTION, POWDER, FOR SOLUTION INTRAVENOUS at 00:17

## 2021-04-26 RX ADMIN — SODIUM ZIRCONIUM CYCLOSILICATE 5 GRAM(S): 10 POWDER, FOR SUSPENSION ORAL at 21:34

## 2021-04-26 RX ADMIN — SODIUM CHLORIDE 3 MILLILITER(S): 9 INJECTION INTRAMUSCULAR; INTRAVENOUS; SUBCUTANEOUS at 05:42

## 2021-04-26 NOTE — PROGRESS NOTE ADULT - ASSESSMENT
69 y/o M with PMH urinary retention due to BPH requiring chronic bourne for past 3 years, chronic anemia, CKD Stage III, DM II, HTN, metastatic SB ca (not on chemo/rad), b/l cataracts legally blind presents to ED with generalized weakness, low grade temperature at home, with recent outpatient diagnosis of UTI that was being treated with Keflex. Last bourne change 4/16/21 due to leak, changed every month. Last CAUTI in March at [facility]. h/o frequent uti  Reports low grade fever and abdominal discomfort, typical of symptoms during uti. Here has been on meropenem, up until yesterday with slightly elevated temperature, patient continues with abdominal discomfort. No flank pain    UCX enterobacter CRE, (S) to vabomere, repeat Ucx negative while on treatment  BCX (-)  per team bourne was exchanged in ED    suggest complete vabomere 2g every 8 hours IV x 7 days adjusted for renal function through 4/30/21  midline ordered  patient lives with daughter but states he might not be able to manage IV at home and also does not want to go to rehab  will remain at risk for recurrent uti  on contact iso    d/w Dr Brown

## 2021-04-26 NOTE — PROGRESS NOTE ADULT - SUBJECTIVE AND OBJECTIVE BOX
Patient is a 70y old  Male who presents with a chief complaint of Catheter associated UTI (24 Apr 2021 11:36)       HPI:  71 y/o male with history of urinary retention due to BPH with chronic indwelling bourne x 3 years, states last changed 3 days ago after it was leaking, HTN, HLD, legally blind, CKD-4 who lives at home with help from family for ADLs. He uses a walker at baseline. He was brought in for feeling generalized weakness earlier today and felt like he was going to pass out. Denies LOC, or falls. No CP, SOB, N/V. He was noted to have low grade temp at home and in ED was 100.6 orally. U/A off new bourne placed in ED with pyuria. No hypotension, no signs of new end organ damage. He was given Cefepime and Vancomycin. Currently feels back to baseline.  (19 Apr 2021 23:19) Was found to have HARLEEN by KDIGO criteria prompting renal consult.  PAtient sees Dr. Carr Outpatient.  Bourne catheter was changed in ER.  Has abdominal pain.  Denies any N/V/Diarrhea.      No c/o   Follow up HARLEEN/CKD    PAST MEDICAL & SURGICAL HISTORY:  Diabetes    Chronic anemia  baseilne Hbg 8-9    CKD (chronic kidney disease) stage 3, GFR 30-59 ml/min    Other secondary cataract of both eyes  legally blind    Urinary retention  chronic bourne    HTN (hypertension)    No significant past surgical history         FAMILY HISTORY:  Family history of diabetes mellitus (Mother)    NC    Social History:Non smoker    MEDICATIONS  (STANDING):  dextrose 40% Gel 15 Gram(s) Oral once  dextrose 5%. 1000 milliLiter(s) (50 mL/Hr) IV Continuous <Continuous>  dextrose 5%. 1000 milliLiter(s) (100 mL/Hr) IV Continuous <Continuous>  dextrose 50% Injectable 25 Gram(s) IV Push once  dextrose 50% Injectable 12.5 Gram(s) IV Push once  dextrose 50% Injectable 25 Gram(s) IV Push once  folic acid 1 milliGRAM(s) Oral daily  glucagon  Injectable 1 milliGRAM(s) IntraMuscular once  heparin   Injectable 5000 Unit(s) SubCutaneous every 12 hours  insulin lispro (ADMELOG) corrective regimen sliding scale   SubCutaneous three times a day before meals  insulin lispro (ADMELOG) corrective regimen sliding scale   SubCutaneous at bedtime  meropenem/vaborbactam IVPB 2 Gram(s) IV Intermittent every 8 hours  metoprolol tartrate 25 milliGRAM(s) Oral two times a day  multivitamin 1 Tablet(s) Oral daily  pantoprazole  Injectable 40 milliGRAM(s) IV Push daily  saccharomyces boulardii 250 milliGRAM(s) Oral two times a day  senna 2 Tablet(s) Oral daily  sodium bicarbonate 650 milliGRAM(s) Oral two times a day  sodium chloride 0.9% lock flush 3 milliLiter(s) IV Push every 8 hours  sucralfate 1 Gram(s) Oral four times a day  tamsulosin 0.8 milliGRAM(s) Oral at bedtime    MEDICATIONS  (PRN):  ondansetron Injectable 4 milliGRAM(s) IV Push every 6 hours PRN Nausea   Meds reviewed    Allergies    amoxicillin (Rash)    Intolerances         REVIEW OF SYSTEMS:    Review of Systems:   Constitutional: Denies fatigue  HEENT: Denies headaches and dizziness  Respiratory: denies SOB, cough, or wheezing  Cardiovascular: denies CP, palpitations  Gastrointestinal: Denies nausea, denies vomiting, diarrhea, constipation, + abdominal pain  Genitourinary: denies painful urination, increased frequency, urgency, or bloody urine  Skin: denies rashes or itching  Musculoskeletal: denies muscle aches, joint swelling  Neurologic: Denies generalized weakness, denies loss of sensation, numbness, or tingling    Vital Signs Last 24 Hrs  T(C): 36.5 (26 Apr 2021 12:34), Max: 36.7 (26 Apr 2021 04:23)  T(F): 97.7 (26 Apr 2021 12:34), Max: 98 (26 Apr 2021 04:23)  HR: 80 (26 Apr 2021 12:34) (77 - 80)  BP: 164/89 (26 Apr 2021 12:34) (135/82 - 164/89)  BP(mean): --  RR: 14 (26 Apr 2021 12:34) (14 - 18)  SpO2: 95% (26 Apr 2021 12:34) (95% - 96%)      PHYSICAL EXAM:    GENERAL: NAD  HEAD:  Atraumatic, Normocephalic  EYES: Blind  ENMT: No Drainage from nares, No drainage from ears  NECK: Supple, neck  veins full  NERVOUS SYSTEM:  Awake and Alert  CHEST/LUNG: Clear to percussion bilaterally; No rales, rhonchi, wheezing, or rubs  HEART: Regular rate and rhythm; No murmurs, rubs, or gallops  ABDOMEN: Soft, Nontender, Nondistended; Bowel sounds present  EXTREMITIES:  No Edema  SKIN: No rashes No obvious ecchymosis      LABS:                        8.0    6.53  )-----------( 336      ( 26 Apr 2021 09:17 )             26.0     04-26    136  |  100  |  40.0<H>  ----------------------------<  123<H>  5.4<H>   |  22.0  |  1.95<H>    Ca    9.1      26 Apr 2021 09:17    TPro  7.4  /  Alb  3.3  /  TBili  0.2<L>  /  DBili  x   /  AST  40<H>  /  ALT  17  /  AlkPhos  80  04-26

## 2021-04-26 NOTE — PROGRESS NOTE ADULT - SUBJECTIVE AND OBJECTIVE BOX
INTERVAL HPI/OVERNIGHT EVENTS: 71 yo Male Patient PMH of Urinary Retention due to BPH requiring chronic Bourne catheter past three years, CKD III with chronic anemia, HTN B/L Cataracts  is legally blind. He was also diagnosed with Metastatic SB CA for which he has declined pursuing chemotherapy or RT treatments. He was admitted here with fever, AMS and catheter related UTI.   Todd has been taking care of himself with minimal medicines, has HHA 4hrs day coming in for help. He was refusing oral medications this morning-among them Metrorprolol.   He allowed a midline to be placed for IV ABX administration, he is aware of UTI and will cooperate with ABX.  He prefers to place his trust in God, was told his cancer has not progressed  last checkup. Asked he  feels he needs more help at home; he replied his daughter wants more help-he thinks everything is going fine.    70y old  Male who presents with a chief complaint of Catheter associated UTI (26 Apr 2021 10:40)  PAST MEDICAL & SURGICAL HISTORY:  Diabetes  Chronic anemia  baseilne Hbg 8-9  CKD (chronic kidney disease) stage 3, GFR 30-59 ml/min  Other secondary cataract of both eyes  legally blind  Urinary retention  chronic bourne  HTN (hypertension)    No significant past surgical history    Present Symptoms:     Dyspnea: 0   Nausea/Vomiting: No  Anxiety:  No  Depression: ??  Fatigue: Yes   Loss of appetite:  No    Pain: wincing at intervals during my visit-but denies pain            Character-            Duration-            Effect-            Factors-            Frequency-            Location-            Severity-    Review of Systems: Reviewed                       All others negative    MEDICATIONS  (STANDING):  dextrose 40% Gel 15 Gram(s) Oral once  dextrose 5%. 1000 milliLiter(s) (50 mL/Hr) IV Continuous <Continuous>  dextrose 5%. 1000 milliLiter(s) (100 mL/Hr) IV Continuous <Continuous>  dextrose 50% Injectable 25 Gram(s) IV Push once  dextrose 50% Injectable 12.5 Gram(s) IV Push once  dextrose 50% Injectable 25 Gram(s) IV Push once  folic acid 1 milliGRAM(s) Oral daily  glucagon  Injectable 1 milliGRAM(s) IntraMuscular once  heparin   Injectable 5000 Unit(s) SubCutaneous every 12 hours  insulin lispro (ADMELOG) corrective regimen sliding scale   SubCutaneous three times a day before meals  insulin lispro (ADMELOG) corrective regimen sliding scale   SubCutaneous at bedtime  meropenem/vaborbactam IVPB 2 Gram(s) IV Intermittent every 8 hours  metoprolol tartrate 25 milliGRAM(s) Oral two times a day  multivitamin 1 Tablet(s) Oral daily  pantoprazole  Injectable 40 milliGRAM(s) IV Push daily  saccharomyces boulardii 250 milliGRAM(s) Oral two times a day  senna 2 Tablet(s) Oral daily  sodium bicarbonate 650 milliGRAM(s) Oral two times a day  sodium chloride 0.45%. 1000 milliLiter(s) (75 mL/Hr) IV Continuous <Continuous>  sodium chloride 0.9% lock flush 3 milliLiter(s) IV Push every 8 hours  sodium zirconium cyclosilicate 5 Gram(s) Oral once  sucralfate 1 Gram(s) Oral four times a day  tamsulosin 0.8 milliGRAM(s) Oral at bedtime    MEDICATIONS  (PRN):  ondansetron Injectable 4 milliGRAM(s) IV Push every 6 hours PRN Nausea    General: alert  oriented x __3__                   cachexia      HEENT: normal      Lungs: comfortable    CV: normal      GI: normal                   constipation  last BM: today on commode    :  steffen replaced in ED    MSK:   weakness              ambulatory  short distance with assist    Skin:   no rash    LABS:                        8.0    6.53  )-----------( 336      ( 26 Apr 2021 09:17 )             26.0     04-26    136  |  100  |  40.0<H>  ----------------------------<  123<H>  5.4<H>   |  22.0  |  1.95<H>    Ca    9.1      26 Apr 2021 09:17    TPro  7.4  /  Alb  3.3  /  TBili  0.2<L>  /  DBili  x   /  AST  40<H>  /  ALT  17  /  AlkPhos  80  04-26    I&O's Summary    25 Apr 2021 07:01  -  26 Apr 2021 07:00  --------------------------------------------------------  IN: 0 mL / OUT: 2650 mL / NET: -2650 mL    Vital Signs Last 24 Hrs  T(C): 36.5 (26 Apr 2021 12:34), Max: 36.7 (26 Apr 2021 04:23)  T(F): 97.7 (26 Apr 2021 12:34), Max: 98 (26 Apr 2021 04:23)  HR: 80 (26 Apr 2021 12:34) (77 - 80)  BP: 164/89 (26 Apr 2021 12:34) (135/82 - 164/89)  BP(mean): --  RR: 14 (26 Apr 2021 12:34) (14 - 18)  SpO2: 95% (26 Apr 2021 12:34) (95% - 96%)    RADIOLOGY & ADDITIONAL STUDIES:  ADVANCE DIRECTIVES:   DNR NO  Completed on:                     MOLST  NO   Completed on:  Living Will  NO   Completed on:    COUNSELING:    Face to face meeting to discuss Advanced Care Planning - Time Spent ______ Minutes.  See goals of care note.    More than 50% time spent in counseling and coordinating care. ___30___ Minutes.     Thank you for the opportunity to assist with the care of this patient.   [facility] [phone].

## 2021-04-26 NOTE — PROGRESS NOTE ADULT - SUBJECTIVE AND OBJECTIVE BOX
[facility]  INFECTIOUS DISEASES AND INTERNAL MEDICINE at [place]  =======================================================  Jamel Horta MD  Diplomates American Board of Internal Medicine and Infectious Diseases  Tel: [phone]      Fax: [phone]  =======================================================    ANTOINETTE WILSON [id number]    Follow up: CRe uti  feels well  mild abdominal discomfort  better      Allergies:  amoxicillin (Rash)           REVIEW OF SYSTEMS:  CONSTITUTIONAL:  No Fever or chills  HEENT:   No diplopia or blurred vision.  No earache, sore throat or runny nose.  CARDIOVASCULAR:  No pressure, squeezing, strangling, tightness, heaviness or aching about the chest, neck, axilla or epigastrium.  RESPIRATORY:  No cough, shortness of breath  GASTROINTESTINAL:  No nausea, vomiting or diarrhea.  GENITOURINARY:  No dysuria, frequency or urgency. No Blood in urine  MUSCULOSKELETAL:  no joint aches, no muscle pain  SKIN:  No change in skin, hair or nails.  NEUROLOGIC:  No Headaches, seizures or weakness.  PSYCHIATRIC:  No disorder of thought or mood.  ENDOCRINE:  No heat or cold intolerance  HEMATOLOGICAL:  No easy bruising or bleeding.       Physical Exam:  GEN: NAD, pleasant  HEENT: normocephalic and atraumatic. EOMI. PERRL.  Anicteric   NECK: Supple.   LUNGS: Clear to auscultation.  HEART: Regular rate and rhythm without murmur.  ABDOMEN: Soft, nontender, and nondistended.  Positive bowel sounds.    : No CVA tenderness  EXTREMITIES: Without any edema.  MSK: no joint swelling  NEUROLOGIC: Cranial nerves II through XII are grossly intact. No focal deficits  PSYCHIATRIC: Appropriate affect .  SKIN: No Rash      Vitals:    T(F): 98 (26 Apr 2021 04:23), Max: 98.4 (25 Apr 2021 11:09)  HR: 77 (26 Apr 2021 04:23)  BP: 135/82 (26 Apr 2021 04:23)  RR: 18 (26 Apr 2021 04:23)  SpO2: 96% (26 Apr 2021 04:23) (94% - 96%)  temp max in last 48H T(F): , Max: 98.4 (04-25-21 @ 11:09)    Current Antibiotics:  meropenem/vaborbactam IVPB 2 Gram(s) IV Intermittent every 8 hours    Other medications:  dextrose 40% Gel 15 Gram(s) Oral once  dextrose 5%. 1000 milliLiter(s) IV Continuous <Continuous>  dextrose 5%. 1000 milliLiter(s) IV Continuous <Continuous>  dextrose 50% Injectable 25 Gram(s) IV Push once  dextrose 50% Injectable 12.5 Gram(s) IV Push once  dextrose 50% Injectable 25 Gram(s) IV Push once  folic acid 1 milliGRAM(s) Oral daily  glucagon  Injectable 1 milliGRAM(s) IntraMuscular once  heparin   Injectable 5000 Unit(s) SubCutaneous every 12 hours  insulin lispro (ADMELOG) corrective regimen sliding scale   SubCutaneous three times a day before meals  insulin lispro (ADMELOG) corrective regimen sliding scale   SubCutaneous at bedtime  metoprolol tartrate 25 milliGRAM(s) Oral two times a day  multivitamin 1 Tablet(s) Oral daily  pantoprazole  Injectable 40 milliGRAM(s) IV Push daily  saccharomyces boulardii 250 milliGRAM(s) Oral two times a day  senna 2 Tablet(s) Oral daily  sodium bicarbonate 650 milliGRAM(s) Oral two times a day  sodium chloride 0.45%. 1000 milliLiter(s) IV Continuous <Continuous>  sodium chloride 0.9% lock flush 3 milliLiter(s) IV Push every 8 hours  sucralfate 1 Gram(s) Oral four times a day  tamsulosin 0.8 milliGRAM(s) Oral at bedtime                            8.0    6.53  )-----------( 336      ( 26 Apr 2021 09:17 )             26.0     04-26    136  |  100  |  40.0<H>  ----------------------------<  123<H>  5.4<H>   |  22.0  |  1.95<H>    Ca    9.1      26 Apr 2021 09:17    TPro  7.4  /  Alb  3.3  /  TBili  0.2<L>  /  DBili  x   /  AST  40<H>  /  ALT  17  /  AlkPhos  80  04-26    RECENT CULTURES:  04-23 @ 13:06 .Urine Catheterized     No growth        04-20 @ 00:43 .Urine Clean Catch (Midstream) Enterobacter cloacae (Carbapenem Resistant)    10,000 - 49,000 CFU/mL Enterobacter cloacae complex (Carbapenem Resistant)  Culture in progress        04-19 @ 16:33 .Blood Blood-Peripheral     No growth at 5 days.        04-19 @ 16:32 .Blood Blood-Peripheral     No growth at 5 days.        04-19 @ 16:30          NotDetec      WBC Count: 6.53 K/uL (04-26-21 @ 09:17)  WBC Count: 6.03 K/uL (04-25-21 @ 09:56)  WBC Count: 5.28 K/uL (04-23-21 @ 07:52)  WBC Count: 8.16 K/uL (04-21-21 @ 13:26)    Creatinine, Serum: 1.95 mg/dL (04-26-21 @ 09:17)  Creatinine, Serum: 1.97 mg/dL (04-25-21 @ 09:56)  Creatinine, Serum: 2.08 mg/dL (04-23-21 @ 07:52)  Creatinine, Serum: 1.77 mg/dL (04-21-21 @ 13:26)      Ferritin, Serum: 73 ng/mL (04-20-21 @ 06:54)         SARS-CoV-2: NotDetec (04-19-21 @ 16:30)  Rapid RVP Result: NotDetec (04-19-21 @ 16:30)

## 2021-04-26 NOTE — PROGRESS NOTE ADULT - SUBJECTIVE AND OBJECTIVE BOX
[facility] Division of Hospital Medicine  Pa Brown [phone]    Chief Complaint:  Patient is a 70y old  Male who presents with a chief complaint of Catheter associated UTI (26 Apr 2021 10:37)      SUBJECTIVE / OVERNIGHT EVENTS:  Pt seen and examined at bedside. No acute events reported overnight. No new complaints.    Patient denies chest pain, SOB, abd pain, N/V, fever, chills, dysuria or any other complaints. All remainder ROS negative.     MEDICATIONS  (STANDING):  dextrose 40% Gel 15 Gram(s) Oral once  dextrose 5%. 1000 milliLiter(s) (50 mL/Hr) IV Continuous <Continuous>  dextrose 5%. 1000 milliLiter(s) (100 mL/Hr) IV Continuous <Continuous>  dextrose 50% Injectable 25 Gram(s) IV Push once  dextrose 50% Injectable 12.5 Gram(s) IV Push once  dextrose 50% Injectable 25 Gram(s) IV Push once  folic acid 1 milliGRAM(s) Oral daily  glucagon  Injectable 1 milliGRAM(s) IntraMuscular once  heparin   Injectable 5000 Unit(s) SubCutaneous every 12 hours  insulin lispro (ADMELOG) corrective regimen sliding scale   SubCutaneous three times a day before meals  insulin lispro (ADMELOG) corrective regimen sliding scale   SubCutaneous at bedtime  meropenem/vaborbactam IVPB 2 Gram(s) IV Intermittent every 8 hours  metoprolol tartrate 25 milliGRAM(s) Oral two times a day  multivitamin 1 Tablet(s) Oral daily  pantoprazole  Injectable 40 milliGRAM(s) IV Push daily  saccharomyces boulardii 250 milliGRAM(s) Oral two times a day  senna 2 Tablet(s) Oral daily  sodium bicarbonate 650 milliGRAM(s) Oral two times a day  sodium chloride 0.45%. 1000 milliLiter(s) (75 mL/Hr) IV Continuous <Continuous>  sodium chloride 0.9% lock flush 3 milliLiter(s) IV Push every 8 hours  sodium zirconium cyclosilicate 5 Gram(s) Oral once  sucralfate 1 Gram(s) Oral four times a day  tamsulosin 0.8 milliGRAM(s) Oral at bedtime    MEDICATIONS  (PRN):  ondansetron Injectable 4 milliGRAM(s) IV Push every 6 hours PRN Nausea        I&O's Summary    25 Apr 2021 07:01  -  26 Apr 2021 07:00  --------------------------------------------------------  IN: 0 mL / OUT: 2650 mL / NET: -2650 mL        PHYSICAL EXAM:  Vital Signs Last 24 Hrs  T(C): 36.7 (26 Apr 2021 04:23), Max: 36.9 (25 Apr 2021 11:09)  T(F): 98 (26 Apr 2021 04:23), Max: 98.4 (25 Apr 2021 11:09)  HR: 77 (26 Apr 2021 04:23) (76 - 77)  BP: 135/82 (26 Apr 2021 04:23) (135/82 - 161/81)  BP(mean): --  RR: 18 (26 Apr 2021 04:23) (18 - 18)  SpO2: 96% (26 Apr 2021 04:23) (94% - 96%)        CONSTITUTIONAL: NAD  HEENT: NC/AT, legally blind  RESPIRATORY: CTA bilaterally, normal effort  CARDIOVASCULAR: RRR, S1/S2+, no m/g/r  ABDOMEN: Nontender to palpation, normoactive bowel sounds, no rebound/guarding; No hepatosplenomegaly  MUSCLOSKELETAL: No edema, cyanosis or deformities.  PSYCH: A+O to person, place, and time; affect appropriate  NEUROLOGY: CN 2-12 are intact and symmetric; no gross neurological deficits.  SKIN: No rashes; no palpable lesions  VASC: Distal pulses palpable    LABS:                        8.0    6.53  )-----------( 336      ( 26 Apr 2021 09:17 )             26.0     04-26    136  |  100  |  40.0<H>  ----------------------------<  123<H>  5.4<H>   |  22.0  |  1.95<H>    Ca    9.1      26 Apr 2021 09:17    TPro  7.4  /  Alb  3.3  /  TBili  0.2<L>  /  DBili  x   /  AST  40<H>  /  ALT  17  /  AlkPhos  80  04-26              Culture - Urine (collected 23 Apr 2021 13:06)  Source: .Urine Catheterized  Final Report (24 Apr 2021 09:41):    No growth      CAPILLARY BLOOD GLUCOSE      POCT Blood Glucose.: 136 mg/dL (26 Apr 2021 08:04)  POCT Blood Glucose.: 160 mg/dL (25 Apr 2021 21:30)  POCT Blood Glucose.: 163 mg/dL (25 Apr 2021 16:12)  POCT Blood Glucose.: 204 mg/dL (25 Apr 2021 11:37)        RADIOLOGY & ADDITIONAL TESTS:  Results Reviewed:   Imaging Personally Reviewed:  Electrocardiogram Personally Reviewed:

## 2021-04-26 NOTE — DIETITIAN INITIAL EVALUATION ADULT. - OTHER INFO
71 y/o male with history of urinary retention due to BPH with chronic indwelling bourne x 3 years, states last changed 3 days ago after it was leaking, HTN, HLD, legally blind, CKD-4 who lives at home with help from family for ADLs. He uses a walker at baseline. He was brought in for feeling generalized weakness earlier today and felt like he was going to pass out. Pt admitted with catheter associated UTI, bourne replaced in ED. Aware palliative following for GOC. Pt eating well in house completing 100% of meals per documentation. Pt refusing RD interview at this time, RD to remain available.

## 2021-04-26 NOTE — DIETITIAN INITIAL EVALUATION ADULT. - PERTINENT LABORATORY DATA
04-26 Na136 mmol/L Glu 123 mg/dL<H> K+ 5.4 mmol/L<H> Cr  1.95 mg/dL<H> BUN 40.0 mg/dL<H> Phos n/a   Alb 3.3 g/dL PAB n/a

## 2021-04-26 NOTE — PROGRESS NOTE ADULT - ASSESSMENT
HARLEEN on CKD 3b  UTI  Chronic Warren  HTN  Anemia CKD  Hyperkalemia     -BLCr 1.7  -Renal function relatively stable near baseline   -Urine reviewed. Urine +urine eosinophil but clinically no AIN. We would not start steroid   -CT abd -Moderate bilateral perinephric stranding. Left renal upper pole proteinaceous cyst and pole simple cyst. Mildly atrophic right kidney. No evidence of hydronephrosis or stone bilaterally.  -Abx per ID, currently on vabomere  -Gentle IVF  -Agree with Lokelma x 1   -Check Iron studies   -BP controlled  -Cont Sodium bicarb oral

## 2021-04-26 NOTE — PROGRESS NOTE ADULT - ASSESSMENT
Assessment and Plan:   · Assessment	  71 y/o M with PMHx urinary retention due to BPH requiring chronic bourne for past 3 years, chronic anemia, CKD Stage III, DM II, HTN, metastatic SB ca (not on chemo/rad), b/l cataracts legally blind presents to ED with generalized weakness, low grade temperature at home, with recent outpatient diagnosis of UTI that was being treated with Keflex.     Catheter associated UTI   Chronic bourne was replaced in ED - pyuria on placement  In ED received Cefepime and Vanco IV. Rocephin IV changed to Meropenem due to preliminary sensitivity reading of MDRO.    Prelim urine culture + for Enterobacter cloacae  ID following  Meropenem switched to Vabomere per ID. C/w until 4/30.       Essential HTN  Patient is not taking anything for BP control at home  Refusing  Metroprolol this morning   Metoprolol 25mg oral BID      Metastatic Bowel CA -    CT scan appears to show duodenal cancer with mets to the liver -   We talked about his coming to an informed decision when deciding he would refuse chemotherapy and RT treatments  Risks outweighed benefits-he is at peace with his decision.    HARLEEN on CKD III   Baseline GFR 30-59   Bourne in place    Nephro following   Monitor BMP BUN 42 Cr. 1.97      Legally Blind   b/l cataracts   uses cane at baseline  Lives at home with daughter Amelia     [facility]  Treat acute infections or traumatic injury  Not pursuing curative chemo or RT treatments  Full Code for now.   Pt has been resistant  to discuss Advanced Directives- Will speak to his daughter and continue building a rapport with Mr. Lopez, and have that conversation  when I offer him more help at home when discharged-specifically tapping into hospice benefit  Pending antibiotics until 4/30.  He wants to return home Pt refusing SILIVNA.   Designating his daughter Amelia as his HCP

## 2021-04-26 NOTE — GOALS OF CARE CONVERSATION - ADVANCED CARE PLANNING - CONVERSATION DETAILS
I called daughter Amelia, who Patient is living with, and is his part-time caregiver.  She clarified that Dad has HHA's 4hrs day 5 days week coming in now to help.  She is very interested in obtaining more help for him at home.    I brought up the idea of Hospice support which he is entitled to since he has Metastatic Ca and will not be pursuing curative treatments specifically Chemotherapy/Radiation Therapy.  She listened but seemed more interested in hearing about how to go about requesting and setting up more home care for Dad    I told her I would get # for Medicaid Office so they can explain how to begin the application. No complaints

## 2021-04-26 NOTE — PROCEDURE NOTE - NSPICCPOWRINJECT_VASC_A_CORE
49yo F with L ICA stenosis and distal occlusion on previous imaging, acute L CVA No 47yo F with left extracranial ICA stenosis and intracranial carotid and MCA occlusion on previous imaging, acute (worsening) left hemispheric ischemia with CVA

## 2021-04-26 NOTE — PROGRESS NOTE ADULT - ASSESSMENT
71 y/o M with PMHx urinary retention due to BPH requiring chronic bourne for past 3 years, chronic anemia, CKD Stage III, DM II, HTN, metastatic SB ca (not on chemo/rad), b/l cataracts legally blind presents to ED with generalized weakness, low grade temperature at home, with recent outpatient diagnosis of UTI that was being treated with Keflex.     #Catheter associated UTI  - Chronic bourne was replaced in ED - pyuria on placement  - In ED received Cefepime and Vanco IV. Rocephin IV changed to Meropenem due to preliminary sensitivity reading of MDRO.    - Prelim urine culture + for Enterobacter cloacae  - all other cultures negative  - Repeat UCx: No growth.  - ID following  - Meropenem switched to Vabomere per ID. C/w until 4/30.     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 Lew office via [facility]    #Essential HTN  - Metoprolol 25mg oral BID    # DM II  -Patient on SS   -Glucose controlled on current regimen   -Elevated A1c - 7.4    #HARLEEN on CKD III  - Baseline GFR 30-59  - Bourne in place   - Nephro following  - Monitor BMP    #Chronic anemia  -Baseline hemoglobin 8-9  -Patient hemodynamically stable    #Legally Blind   -b/l cataracts   -uses cane at baseline  -Lives at home with daughter Amelia     #DVT ppx: Heparin 5000U SQ q12  #GI ppx: Protonix 40mg oral before breakfast  #Diet: Diet to be changed to soft w/stated intolerance to starch   #Code Status: Full Code for now. Pt resistant in discussing GOC with myself and Palliative.   #Disposition: Pending antibiotics until 4/30. Pt refusing SILVINA. Unclear if able to go home w/ midline as Abx are q8h. Will attempt to reach out to daughter again. Have been unable to reach past 2 days.

## 2021-04-27 LAB
ALBUMIN SERPL ELPH-MCNC: 3.5 G/DL — SIGNIFICANT CHANGE UP (ref 3.3–5.2)
ALP SERPL-CCNC: 76 U/L — SIGNIFICANT CHANGE UP (ref 40–120)
ALT FLD-CCNC: 17 U/L — SIGNIFICANT CHANGE UP
ANION GAP SERPL CALC-SCNC: 14 MMOL/L — SIGNIFICANT CHANGE UP (ref 5–17)
AST SERPL-CCNC: 43 U/L — HIGH
BILIRUB SERPL-MCNC: 0.2 MG/DL — LOW (ref 0.4–2)
BUN SERPL-MCNC: 37 MG/DL — HIGH (ref 8–20)
CALCIUM SERPL-MCNC: 9.4 MG/DL — SIGNIFICANT CHANGE UP (ref 8.6–10.2)
CHLORIDE SERPL-SCNC: 100 MMOL/L — SIGNIFICANT CHANGE UP (ref 98–107)
CO2 SERPL-SCNC: 23 MMOL/L — SIGNIFICANT CHANGE UP (ref 22–29)
CREAT SERPL-MCNC: 2.01 MG/DL — HIGH (ref 0.5–1.3)
GLUCOSE BLDC GLUCOMTR-MCNC: 126 MG/DL — HIGH (ref 70–99)
GLUCOSE BLDC GLUCOMTR-MCNC: 155 MG/DL — HIGH (ref 70–99)
GLUCOSE BLDC GLUCOMTR-MCNC: 158 MG/DL — HIGH (ref 70–99)
GLUCOSE BLDC GLUCOMTR-MCNC: 188 MG/DL — HIGH (ref 70–99)
GLUCOSE SERPL-MCNC: 122 MG/DL — HIGH (ref 70–99)
HCT VFR BLD CALC: 27.3 % — LOW (ref 39–50)
HGB BLD-MCNC: 8.3 G/DL — LOW (ref 13–17)
MCHC RBC-ENTMCNC: 20 PG — LOW (ref 27–34)
MCHC RBC-ENTMCNC: 30.4 GM/DL — LOW (ref 32–36)
MCV RBC AUTO: 65.8 FL — LOW (ref 80–100)
PHOSPHATE SERPL-MCNC: 4 MG/DL — SIGNIFICANT CHANGE UP (ref 2.4–4.7)
PLATELET # BLD AUTO: 366 K/UL — SIGNIFICANT CHANGE UP (ref 150–400)
POTASSIUM SERPL-MCNC: 5 MMOL/L — SIGNIFICANT CHANGE UP (ref 3.5–5.3)
POTASSIUM SERPL-SCNC: 5 MMOL/L — SIGNIFICANT CHANGE UP (ref 3.5–5.3)
PROT SERPL-MCNC: 7.4 G/DL — SIGNIFICANT CHANGE UP (ref 6.6–8.7)
RBC # BLD: 4.15 M/UL — LOW (ref 4.2–5.8)
RBC # FLD: 21.4 % — HIGH (ref 10.3–14.5)
SODIUM SERPL-SCNC: 137 MMOL/L — SIGNIFICANT CHANGE UP (ref 135–145)
WBC # BLD: 5.67 K/UL — SIGNIFICANT CHANGE UP (ref 3.8–10.5)
WBC # FLD AUTO: 5.67 K/UL — SIGNIFICANT CHANGE UP (ref 3.8–10.5)

## 2021-04-27 PROCEDURE — 99233 SBSQ HOSP IP/OBS HIGH 50: CPT

## 2021-04-27 PROCEDURE — 70450 CT HEAD/BRAIN W/O DYE: CPT | Mod: 26

## 2021-04-27 PROCEDURE — 99232 SBSQ HOSP IP/OBS MODERATE 35: CPT

## 2021-04-27 RX ORDER — FERROUS SULFATE 325(65) MG
325 TABLET ORAL DAILY
Refills: 0 | Status: DISCONTINUED | OUTPATIENT
Start: 2021-04-27 | End: 2021-05-01

## 2021-04-27 RX ORDER — SODIUM ZIRCONIUM CYCLOSILICATE 10 G/10G
10 POWDER, FOR SUSPENSION ORAL ONCE
Refills: 0 | Status: DISCONTINUED | OUTPATIENT
Start: 2021-04-27 | End: 2021-04-27

## 2021-04-27 RX ADMIN — SODIUM CHLORIDE 3 MILLILITER(S): 9 INJECTION INTRAMUSCULAR; INTRAVENOUS; SUBCUTANEOUS at 12:59

## 2021-04-27 RX ADMIN — SODIUM CHLORIDE 3 MILLILITER(S): 9 INJECTION INTRAMUSCULAR; INTRAVENOUS; SUBCUTANEOUS at 21:48

## 2021-04-27 RX ADMIN — PANTOPRAZOLE SODIUM 40 MILLIGRAM(S): 20 TABLET, DELAYED RELEASE ORAL at 11:04

## 2021-04-27 RX ADMIN — Medication 2: at 16:50

## 2021-04-27 RX ADMIN — SODIUM CHLORIDE 3 MILLILITER(S): 9 INJECTION INTRAMUSCULAR; INTRAVENOUS; SUBCUTANEOUS at 05:03

## 2021-04-27 RX ADMIN — MEROPENEM-VABORBACTAM 83.33 GRAM(S): 1; 1 INJECTION, POWDER, FOR SOLUTION INTRAVENOUS at 11:03

## 2021-04-27 RX ADMIN — MEROPENEM-VABORBACTAM 83.33 GRAM(S): 1; 1 INJECTION, POWDER, FOR SOLUTION INTRAVENOUS at 02:04

## 2021-04-27 RX ADMIN — MEROPENEM-VABORBACTAM 83.33 GRAM(S): 1; 1 INJECTION, POWDER, FOR SOLUTION INTRAVENOUS at 19:29

## 2021-04-27 NOTE — PROGRESS NOTE ADULT - ASSESSMENT
71 y/o M with PMHx urinary retention due to BPH requiring chronic bourne for past 3 years, chronic anemia, CKD Stage III, DM II, HTN, metastatic SB ca (not on chemo/rad), b/l cataracts legally blind presents to ED with generalized weakness, low grade temperature at home, with recent outpatient diagnosis of UTI that was being treated with Keflex.     #Catheter associated UTI  - Chronic bourne was replaced in ED - pyuria on placement  - In ED received Cefepime and Vanco IV. Rocephin IV changed to Meropenem due to preliminary sensitivity reading of MDRO.    - Prelim urine culture + for Enterobacter cloacae  - all other cultures negative  - Repeat UCx: No growth.  - ID following  - Meropenem switched to Vabomere per ID. C/w until 4/30.     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 Lew office via [facility]    #Essential HTN  - Metoprolol 25mg oral BID    # DM II  -Patient on SS   -Glucose controlled on current regimen   -Elevated A1c - 7.4    #HARLEEN on CKD III  - Crt around baseline  - Baseline GFR 30-59  - Bourne in place   - Nephro following  - Monitor BMP    #Chronic anemia  -Baseline hemoglobin 8-9  -Patient hemodynamically stable    #Legally Blind   -b/l cataracts   -uses cane at baseline  -Lives at home with daughter Amelia     #DVT ppx: Heparin 5000U SQ q12  #GI ppx: Protonix 40mg oral before breakfast  #Diet: Diet to be changed to soft w/stated intolerance to starch   #Code Status: Full Code for now. Pt resistant in discussing GOC with myself and Palliative.   #Disposition: Pending antibiotics until 4/30. Pt refusing SILVINA. Daughter stating she was agreeable for home care w/ Abx. This morning pt states daughter said he can stay to complete the Abx. Spoke to daughter, she states she is willing to take him home.    69 y/o M with PMHx urinary retention due to BPH requiring chronic bourne for past 3 years, chronic anemia, CKD Stage III, DM II, HTN, metastatic SB ca (not on chemo/rad), b/l cataracts legally blind presents to ED with generalized weakness, low grade temperature at home, with recent outpatient diagnosis of UTI that was being treated with Keflex.     #Catheter associated UTI  - Chronic bourne was replaced in ED - pyuria on placement  - In ED received Cefepime and Vanco IV. Rocephin IV changed to Meropenem due to preliminary sensitivity reading of MDRO.    - Prelim urine culture + for Enterobacter cloacae  - all other cultures negative  - Repeat UCx: No growth.  - ID following  - Meropenem switched to Vabomere per ID. C/w until 4/30.     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 Lew office via [facility]    #Essential HTN  - Metoprolol 25mg oral BID    # DM II  -Patient on SS   -Glucose controlled on current regimen   -Elevated A1c - 7.4    #HARLEEN on CKD III  - Crt around baseline  - Baseline GFR 30-59  - Bourne in place   - Nephro following  - Monitor BMP    #Chronic anemia  -Baseline hemoglobin 8-9  -Patient hemodynamically stable    #Legally Blind   -b/l cataracts   -uses cane at baseline  -Lives at home with daughter Amelia     #DVT ppx: Heparin 5000U SQ q12  #GI ppx: Protonix 40mg oral before breakfast  #Diet: Diet to be changed to soft w/stated intolerance to starch   #Code Status: Full Code for now. Pt resistant in discussing GOC with myself and Palliative.   #Disposition: Pending antibiotics until 4/30. Pt refusing SILVINA. Daughter stating she was agreeable for home care w/ Abx. This morning pt states daughter said he can stay to complete the Abx. Spoke to daughter to clarify, she states that is not true and she is willing to take him home. She became very confrontational stating "I don't like your attitude and questioning." It was explained that I was just clarifying the situation. She asked about a "nerve test" requested in the ED. She states she and her mother requested a "nerve test" for his mental status and was upset that it had not been done.

## 2021-04-27 NOTE — PROGRESS NOTE ADULT - ASSESSMENT
HARLEEN on CKD 3b  UTI  Chronic [place]  HTN  Anemia CKD  Hyperkalemia     -BLCr 1.7  -Renal function relatively stable near baseline   -Urine reviewed. Urine +urine eosinophil but clinically no AIN. This is likely due to UTI. We would not start steroid treatment   -CT abd -Moderate bilateral perinephric stranding. Left renal upper pole proteinaceous cyst and pole simple cyst. Mildly atrophic right kidney. No evidence of hydronephrosis or stone bilaterally.  -Abx per ID, currently on vabomere  -S/p gentle IVF  -S/p Lokelma x 1. Low K diet   -BP controlled  -Add oral iron   -Cont Sodium bicarb oral

## 2021-04-27 NOTE — PROGRESS NOTE ADULT - SUBJECTIVE AND OBJECTIVE BOX
[facility] Division of Hospital Medicine  Pa Brown [phone]    Chief Complaint:  Patient is a 70y old  Male who presents with a chief complaint of Catheter associated UTI (27 Apr 2021 10:49)      SUBJECTIVE / OVERNIGHT EVENTS:  Pt seen and examined at bedside. No acute events reported overnight. No new complaints.    Patient denies chest pain, SOB, abd pain, N/V, fever, chills, dysuria or any other complaints. All remainder ROS negative.     MEDICATIONS  (STANDING):  dextrose 40% Gel 15 Gram(s) Oral once  dextrose 5%. 1000 milliLiter(s) (50 mL/Hr) IV Continuous <Continuous>  dextrose 5%. 1000 milliLiter(s) (100 mL/Hr) IV Continuous <Continuous>  dextrose 50% Injectable 25 Gram(s) IV Push once  dextrose 50% Injectable 12.5 Gram(s) IV Push once  dextrose 50% Injectable 25 Gram(s) IV Push once  ferrous    sulfate 325 milliGRAM(s) Oral daily  folic acid 1 milliGRAM(s) Oral daily  glucagon  Injectable 1 milliGRAM(s) IntraMuscular once  heparin   Injectable 5000 Unit(s) SubCutaneous every 12 hours  insulin lispro (ADMELOG) corrective regimen sliding scale   SubCutaneous three times a day before meals  insulin lispro (ADMELOG) corrective regimen sliding scale   SubCutaneous at bedtime  meropenem/vaborbactam IVPB 2 Gram(s) IV Intermittent every 8 hours  metoprolol tartrate 25 milliGRAM(s) Oral two times a day  multivitamin 1 Tablet(s) Oral daily  pantoprazole  Injectable 40 milliGRAM(s) IV Push daily  saccharomyces boulardii 250 milliGRAM(s) Oral two times a day  senna 2 Tablet(s) Oral daily  sodium bicarbonate 650 milliGRAM(s) Oral two times a day  sodium chloride 0.45%. 1000 milliLiter(s) (75 mL/Hr) IV Continuous <Continuous>  sodium chloride 0.45%. 1000 milliLiter(s) (75 mL/Hr) IV Continuous <Continuous>  sodium chloride 0.9% lock flush 3 milliLiter(s) IV Push every 8 hours  sucralfate 1 Gram(s) Oral four times a day  tamsulosin 0.8 milliGRAM(s) Oral at bedtime    MEDICATIONS  (PRN):  ondansetron Injectable 4 milliGRAM(s) IV Push every 6 hours PRN Nausea        I&O's Summary    26 Apr 2021 07:01  -  27 Apr 2021 07:00  --------------------------------------------------------  IN: 0 mL / OUT: 5200 mL / NET: -5200 mL        PHYSICAL EXAM:  Vital Signs Last 24 Hrs  T(C): 36.7 (27 Apr 2021 11:09), Max: 36.9 (26 Apr 2021 17:45)  T(F): 98.1 (27 Apr 2021 11:09), Max: 98.4 (26 Apr 2021 17:45)  HR: 78 (27 Apr 2021 11:09) (70 - 80)  BP: 144/78 (27 Apr 2021 11:09) (122/62 - 164/89)  BP(mean): --  RR: 20 (27 Apr 2021 11:09) (14 - 20)  SpO2: 94% (27 Apr 2021 11:09) (94% - 97%)    CONSTITUTIONAL: NAD  HEENT: NC/AT, legally blind  RESPIRATORY: CTA bilaterally, normal effort  CARDIOVASCULAR: RRR, S1/S2+, no m/g/r  ABDOMEN: Nontender to palpation, normoactive bowel sounds, no rebound/guarding; No hepatosplenomegaly  MUSCLOSKELETAL: No edema, cyanosis or deformities.  PSYCH: A+O to person, place, and time; affect appropriate  NEUROLOGY: CN 2-12 are intact and symmetric; no gross neurological deficits.  SKIN: No rashes; no palpable lesions  VASC: Distal pulses palpable    LABS:                        8.3    5.67  )-----------( 366      ( 27 Apr 2021 09:54 )             27.3     04-27    137  |  100  |  37.0<H>  ----------------------------<  122<H>  5.0   |  23.0  |  2.01<H>    Ca    9.4      27 Apr 2021 09:54  Phos  4.0     04-27    TPro  7.4  /  Alb  3.5  /  TBili  0.2<L>  /  DBili  x   /  AST  43<H>  /  ALT  17  /  AlkPhos  76  04-27              CAPILLARY BLOOD GLUCOSE      POCT Blood Glucose.: 158 mg/dL (27 Apr 2021 10:55)  POCT Blood Glucose.: 126 mg/dL (27 Apr 2021 07:57)  POCT Blood Glucose.: 142 mg/dL (26 Apr 2021 21:16)  POCT Blood Glucose.: 126 mg/dL (26 Apr 2021 16:46)        RADIOLOGY & ADDITIONAL TESTS:  Results Reviewed:   Imaging Personally Reviewed:  Electrocardiogram Personally Reviewed:

## 2021-04-27 NOTE — PROGRESS NOTE ADULT - SUBJECTIVE AND OBJECTIVE BOX
Patient is a 70y old  Male who presents with a chief complaint of Catheter associated UTI (24 Apr 2021 11:36)       HPI:  71 y/o male with history of urinary retention due to BPH with chronic indwelling bourne x 3 years, states last changed 3 days ago after it was leaking, HTN, HLD, legally blind, CKD-4 who lives at home with help from family for ADLs. He uses a walker at baseline. He was brought in for feeling generalized weakness earlier today and felt like he was going to pass out. Denies LOC, or falls. No CP, SOB, N/V. He was noted to have low grade temp at home and in ED was 100.6 orally. U/A off new bourne placed in ED with pyuria. No hypotension, no signs of new end organ damage. He was given Cefepime and Vancomycin. Currently feels back to baseline.  (19 Apr 2021 23:19) Was found to have HARLEEN by KDIGO criteria prompting renal consult.  PAtient sees Dr. Carr Outpatient.  Bourne catheter was changed in ER.  Has abdominal pain.  Denies any N/V/Diarrhea.      No c/o   Follow up HARLEEN/CKD    PAST MEDICAL & SURGICAL HISTORY:  Diabetes    Chronic anemia  baseilne Hbg 8-9    CKD (chronic kidney disease) stage 3, GFR 30-59 ml/min    Other secondary cataract of both eyes  legally blind    Urinary retention  chronic bourne    HTN (hypertension)    No significant past surgical history         FAMILY HISTORY:  Family history of diabetes mellitus (Mother)    NC    Social History:Non smoker    MEDICATIONS  (STANDING):  dextrose 40% Gel 15 Gram(s) Oral once  dextrose 5%. 1000 milliLiter(s) (50 mL/Hr) IV Continuous <Continuous>  dextrose 5%. 1000 milliLiter(s) (100 mL/Hr) IV Continuous <Continuous>  dextrose 50% Injectable 25 Gram(s) IV Push once  dextrose 50% Injectable 12.5 Gram(s) IV Push once  dextrose 50% Injectable 25 Gram(s) IV Push once  folic acid 1 milliGRAM(s) Oral daily  glucagon  Injectable 1 milliGRAM(s) IntraMuscular once  heparin   Injectable 5000 Unit(s) SubCutaneous every 12 hours  insulin lispro (ADMELOG) corrective regimen sliding scale   SubCutaneous three times a day before meals  insulin lispro (ADMELOG) corrective regimen sliding scale   SubCutaneous at bedtime  meropenem/vaborbactam IVPB 2 Gram(s) IV Intermittent every 8 hours  metoprolol tartrate 25 milliGRAM(s) Oral two times a day  multivitamin 1 Tablet(s) Oral daily  pantoprazole  Injectable 40 milliGRAM(s) IV Push daily  saccharomyces boulardii 250 milliGRAM(s) Oral two times a day  senna 2 Tablet(s) Oral daily  sodium bicarbonate 650 milliGRAM(s) Oral two times a day  sodium chloride 0.9% lock flush 3 milliLiter(s) IV Push every 8 hours  sucralfate 1 Gram(s) Oral four times a day  tamsulosin 0.8 milliGRAM(s) Oral at bedtime    MEDICATIONS  (PRN):  ondansetron Injectable 4 milliGRAM(s) IV Push every 6 hours PRN Nausea   Meds reviewed    Allergies    amoxicillin (Rash)    Intolerances         REVIEW OF SYSTEMS:    Review of Systems:   Constitutional: Denies fatigue  HEENT: Denies headaches and dizziness  Respiratory: denies SOB, cough, or wheezing  Cardiovascular: denies CP, palpitations  Gastrointestinal: Denies nausea, denies vomiting, diarrhea, constipation, + abdominal pain  Genitourinary: denies painful urination, increased frequency, urgency, or bloody urine  Skin: denies rashes or itching  Musculoskeletal: denies muscle aches, joint swelling  Neurologic: Denies generalized weakness, denies loss of sensation, numbness, or tingling    Vital Signs Last 24 Hrs  T(C): 36.6 (27 Apr 2021 04:40), Max: 36.9 (26 Apr 2021 17:45)  T(F): 97.9 (27 Apr 2021 04:40), Max: 98.4 (26 Apr 2021 17:45)  HR: 76 (27 Apr 2021 04:40) (70 - 80)  BP: 122/62 (27 Apr 2021 04:40) (122/62 - 164/89)  BP(mean): --  RR: 18 (27 Apr 2021 04:40) (14 - 18)  SpO2: 96% (27 Apr 2021 04:40) (95% - 97%)    PHYSICAL EXAM:    GENERAL: NAD  HEAD:  Atraumatic, Normocephalic  EYES: Blind  ENMT: No Drainage from nares, No drainage from ears  NECK: Supple, neck  veins full  NERVOUS SYSTEM:  Awake and Alert  CHEST/LUNG: Clear to percussion bilaterally; No rales, rhonchi, wheezing, or rubs  HEART: Regular rate and rhythm; No murmurs, rubs, or gallops  ABDOMEN: Soft, Nontender, Nondistended; Bowel sounds present  EXTREMITIES:  No Edema  SKIN: No rashes No obvious ecchymosis      LABS:                        8.0    6.53  )-----------( 336      ( 26 Apr 2021 09:17 )             26.0     04-26    136  |  100  |  40.0<H>  ----------------------------<  123<H>  5.4<H>   |  22.0  |  1.95<H>    Ca    9.1      26 Apr 2021 09:17    TPro  7.4  /  Alb  3.3  /  TBili  0.2<L>  /  DBili  x   /  AST  40<H>  /  ALT  17  /  AlkPhos  80  04-26

## 2021-04-27 NOTE — PROGRESS NOTE ADULT - SUBJECTIVE AND OBJECTIVE BOX
[facility]  INFECTIOUS DISEASES AND INTERNAL MEDICINE at [place]  =======================================================  Jamel Horta MD  Diplomates American Board of Internal Medicine and Infectious Diseases  Tel: [phone]      Fax: [phone]  =======================================================    ANTOINETTE WILSON [id number]    Follow up: uti  feels well  abdominal pain better      Allergies:  amoxicillin (Rash)           REVIEW OF SYSTEMS:  CONSTITUTIONAL:  No Fever or chills  HEENT:   No diplopia or blurred vision.  No earache, sore throat or runny nose.  CARDIOVASCULAR:  No pressure, squeezing, strangling, tightness, heaviness or aching about the chest, neck, axilla or epigastrium.  RESPIRATORY:  No cough, shortness of breath  GASTROINTESTINAL:  No nausea, vomiting or diarrhea.  GENITOURINARY:  No dysuria, frequency or urgency. No Blood in urine  MUSCULOSKELETAL:  no joint aches, no muscle pain  SKIN:  No change in skin, hair or nails.  NEUROLOGIC:  No Headaches, seizures or weakness.  PSYCHIATRIC:  No disorder of thought or mood.  ENDOCRINE:  No heat or cold intolerance  HEMATOLOGICAL:  No easy bruising or bleeding.       Physical Exam:  GEN: NAD, pleasant, legally blind  HEENT: normocephalic and atraumatic. EOMI. PERRL.  Anicteric   NECK: Supple.   LUNGS: Clear to auscultation.  HEART: Regular rate and rhythm without murmur.  ABDOMEN: Soft, nontender, and nondistended.  Positive bowel sounds.    : No CVA tenderness  EXTREMITIES: Without any edema. bourne in place  MSK: no joint swelling  NEUROLOGIC: Cranial nerves II through XII are grossly intact. No focal deficits  PSYCHIATRIC: Appropriate affect .  SKIN: No Rash      Vitals:    T(F): 97.9 (27 Apr 2021 04:40), Max: 98.4 (26 Apr 2021 17:45)  HR: 76 (27 Apr 2021 04:40)  BP: 122/62 (27 Apr 2021 04:40)  RR: 18 (27 Apr 2021 04:40)  SpO2: 96% (27 Apr 2021 04:40) (95% - 97%)  temp max in last 48H T(F): , Max: 98.4 (04-25-21 @ 11:09)    Current Antibiotics:  meropenem/vaborbactam IVPB 2 Gram(s) IV Intermittent every 8 hours    Other medications:  dextrose 40% Gel 15 Gram(s) Oral once  dextrose 5%. 1000 milliLiter(s) IV Continuous <Continuous>  dextrose 5%. 1000 milliLiter(s) IV Continuous <Continuous>  dextrose 50% Injectable 25 Gram(s) IV Push once  dextrose 50% Injectable 12.5 Gram(s) IV Push once  dextrose 50% Injectable 25 Gram(s) IV Push once  ferrous    sulfate 325 milliGRAM(s) Oral daily  folic acid 1 milliGRAM(s) Oral daily  glucagon  Injectable 1 milliGRAM(s) IntraMuscular once  heparin   Injectable 5000 Unit(s) SubCutaneous every 12 hours  insulin lispro (ADMELOG) corrective regimen sliding scale   SubCutaneous three times a day before meals  insulin lispro (ADMELOG) corrective regimen sliding scale   SubCutaneous at bedtime  metoprolol tartrate 25 milliGRAM(s) Oral two times a day  multivitamin 1 Tablet(s) Oral daily  pantoprazole  Injectable 40 milliGRAM(s) IV Push daily  saccharomyces boulardii 250 milliGRAM(s) Oral two times a day  senna 2 Tablet(s) Oral daily  sodium bicarbonate 650 milliGRAM(s) Oral two times a day  sodium chloride 0.45%. 1000 milliLiter(s) IV Continuous <Continuous>  sodium chloride 0.45%. 1000 milliLiter(s) IV Continuous <Continuous>  sodium chloride 0.9% lock flush 3 milliLiter(s) IV Push every 8 hours  sucralfate 1 Gram(s) Oral four times a day  tamsulosin 0.8 milliGRAM(s) Oral at bedtime                            8.3    5.67  )-----------( 366      ( 27 Apr 2021 09:54 )             27.3     04-27    137  |  100  |  37.0<H>  ----------------------------<  122<H>  5.0   |  23.0  |  2.01<H>    Ca    9.4      27 Apr 2021 09:54  Phos  4.0     04-27    TPro  7.4  /  Alb  3.5  /  TBili  0.2<L>  /  DBili  x   /  AST  43<H>  /  ALT  17  /  AlkPhos  76  04-27    RECENT CULTURES:  04-23 @ 13:06 .Urine Catheterized     No growth        04-20 @ 00:43 .Urine Clean Catch (Midstream) Enterobacter cloacae (Carbapenem Resistant)    10,000 - 49,000 CFU/mL Enterobacter cloacae complex (Carbapenem Resistant)  Ceftazidime/Avibactam [place]= <=4=(Susceptible)  Ceftolozane/Tazobactam [place]=>8=(Resistant)        04-19 @ 16:33 .Blood Blood-Peripheral     No growth at 5 days.        04-19 @ 16:32 .Blood Blood-Peripheral     No growth at 5 days.        04-19 @ 16:30          NotDetec      WBC Count: 5.67 K/uL (04-27-21 @ 09:54)  WBC Count: 6.53 K/uL (04-26-21 @ 09:17)  WBC Count: 6.03 K/uL (04-25-21 @ 09:56)  WBC Count: 5.28 K/uL (04-23-21 @ 07:52)    Creatinine, Serum: 2.01 mg/dL (04-27-21 @ 09:54)  Creatinine, Serum: 1.95 mg/dL (04-26-21 @ 09:17)  Creatinine, Serum: 1.97 mg/dL (04-25-21 @ 09:56)  Creatinine, Serum: 2.08 mg/dL (04-23-21 @ 07:52)      Ferritin, Serum: 73 ng/mL (04-20-21 @ 06:54)         SARS-CoV-2: NotDetec (04-19-21 @ 16:30)  Rapid RVP Result: NotDetec (04-19-21 @ 16:30)

## 2021-04-27 NOTE — PROGRESS NOTE ADULT - SUBJECTIVE AND OBJECTIVE BOX
INTERVAL HPI/OVERNIGHT EVENTS:  70y old  Male who presents with a chief complaint of Catheter associated UTI (27 Apr 2021 12:14)  F/U Note:  No new events overnight  Feeling a little better and more alert today  Sleeping in his bed at time of my visit. Not sleeping too well at night.  Fair appetite assisted to sit at side of bed and eat lunch  Refusing most oral medications again this morning  Set in his ways, daughter reconfirms he has been this way his whole life "Less is more"  Denies headache, N/V/D/constipation CP, palpitations SOB    Present Symptoms:     Dyspnea: 0    Nausea/Vomiting:  No  Anxiety:   No  Depression:No  Fatigue: Yes  Loss of appetite:  No    Pain: wincing but denies pain-may be having bladder spasms            Character-            Duration-            Effect-            Factors-            Frequency-            Location-            Severity-?    Review of Systems: Reviewed                       All others negative    MEDICATIONS  (STANDING):  dextrose 40% Gel 15 Gram(s) Oral once  dextrose 5%. 1000 milliLiter(s) (100 mL/Hr) IV Continuous <Continuous>  dextrose 5%. 1000 milliLiter(s) (50 mL/Hr) IV Continuous <Continuous>  dextrose 50% Injectable 25 Gram(s) IV Push once  dextrose 50% Injectable 12.5 Gram(s) IV Push once  dextrose 50% Injectable 25 Gram(s) IV Push once  ferrous    sulfate 325 milliGRAM(s) Oral daily  folic acid 1 milliGRAM(s) Oral daily  glucagon  Injectable 1 milliGRAM(s) IntraMuscular once  heparin   Injectable 5000 Unit(s) SubCutaneous every 12 hours  insulin lispro (ADMELOG) corrective regimen sliding scale   SubCutaneous three times a day before meals  insulin lispro (ADMELOG) corrective regimen sliding scale   SubCutaneous at bedtime  meropenem/vaborbactam IVPB 2 Gram(s) IV Intermittent every 8 hours  metoprolol tartrate 25 milliGRAM(s) Oral two times a day  multivitamin 1 Tablet(s) Oral daily  pantoprazole  Injectable 40 milliGRAM(s) IV Push daily  saccharomyces boulardii 250 milliGRAM(s) Oral two times a day  senna 2 Tablet(s) Oral daily  sodium bicarbonate 650 milliGRAM(s) Oral two times a day  sodium chloride 0.45%. 1000 milliLiter(s) (75 mL/Hr) IV Continuous <Continuous>  sodium chloride 0.45%. 1000 milliLiter(s) (75 mL/Hr) IV Continuous <Continuous>  sodium chloride 0.9% lock flush 3 milliLiter(s) IV Push every 8 hours  sucralfate 1 Gram(s) Oral four times a day  tamsulosin 0.8 milliGRAM(s) Oral at bedtime    MEDICATIONS  (PRN):  ondansetron Injectable 4 milliGRAM(s) IV Push every 6 hours PRN Nausea    General: alert  oriented x __3__                  overweight     HEENT: normal      Lungs: comfortable    CV: normal      GI: soft  distended                  constipation  last BM: yesterday    : urinary retention clear yellow urine  bourne    MSK: weakness  edema trace          bed to chair/commode  Skin: normal _  no rash    LABS:                        8.3    5.67  )-----------( 366      ( 27 Apr 2021 09:54 )             27.3     04-27    137  |  100  |  37.0<H>  ----------------------------<  122<H>  5.0   |  23.0  |  2.01<H>    Ca    9.4      27 Apr 2021 09:54  Phos  4.0     04-27    TPro  7.4  /  Alb  3.5  /  TBili  0.2<L>  /  DBili  x   /  AST  43<H>  /  ALT  17  /  AlkPhos  76  04-27    I&O's Summary    26 Apr 2021 07:01  -  27 Apr 2021 07:00  --------------------------------------------------------  IN: 0 mL / OUT: 5200 mL / NET: -5200 mL    27 Apr 2021 07:01  -  27 Apr 2021 17:39  --------------------------------------------------------  IN: 0 mL / OUT: 1300 mL / NET: -1300 mL    Vital Signs Last 24 Hrs  T(C): 36.7 (27 Apr 2021 11:09), Max: 36.9 (26 Apr 2021 17:45)  T(F): 98.1 (27 Apr 2021 11:09), Max: 98.4 (26 Apr 2021 17:45)  HR: 78 (27 Apr 2021 11:09) (70 - 78)  BP: 144/78 (27 Apr 2021 11:09) (122/62 - 158/84)  BP(mean): --  RR: 20 (27 Apr 2021 11:09) (18 - 20)  SpO2: 94% (27 Apr 2021 11:09) (94% - 97%)    RADIOLOGY & ADDITIONAL STUDIES:    ADVANCE DIRECTIVES:   DNR  NO  Completed on:                     MOLST   NO   Completed on: RN told me Patient does not want DNR-as stated  I will wait until later in the week to discuss setting up Directives for home  Living Will   NO   Completed on:    COUNSELING:    Face to face meeting to discuss Advanced Care Planning - Time Spent ______ Minutes.  See goals of care note.    More than 50% time spent in counseling and coordinating care. ___25___ Minutes.     Thank you for the opportunity to assist with the care of this patient.   [facility] [phone].

## 2021-04-27 NOTE — PROGRESS NOTE ADULT - ASSESSMENT
Assessment and Plan:   · Assessment	  71 y/o M with PMHx urinary retention due to BPH requiring chronic bourne for past 3 years, chronic anemia, CKD Stage III, DM II, HTN, metastatic SB ca (not on chemo/rad), b/l cataracts legally blind presents to ED with generalized weakness, low grade temperature at home, with recent outpatient diagnosis of UTI that was being treated with Keflex.     Catheter associated UTI   Chronic bourne was replaced in ED - pyuria on placement  In ED received Cefepime and Vanco IV. Rocephin IV changed to Meropenem due to preliminary sensitivity reading of MDRO.    Prelim urine culture + for Enterobacter cloacae  ID following  Meropenem switched to Vabomere per ID. C/w until 4/30.     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    HARLEEN on CKD III   Baseline GFR 30-59   Bourne in place    Nephro following   Monitor BMP BUN 37Cr. 2.01    Legally Blind  b/l cataracts   uses cane at baseline  Lives at home with daughter Amelia     [facility]  Treat acute infections or traumatic injury  Not pursuing curative chemo or RT treatments  Full Code for now.   Pt has been resistant  to discuss Advanced Directives- Will speak to his daughter and continue building a rapport with Mr. Lopez, and have that conversation  when I offer him more help at home when discharged-specifically tapping into hospice benefit  Pending antibiotics until 4/30.  He wants to return home Pt refusing SILVINA.   Designating his daughter Amelia as his HCP

## 2021-04-27 NOTE — PROGRESS NOTE ADULT - ASSESSMENT
71 y/o M with PMH urinary retention due to BPH requiring chronic bourne for past 3 years, chronic anemia, CKD Stage III, DM II, HTN, metastatic SB ca (not on chemo/rad), b/l cataracts legally blind presents to ED with generalized weakness, low grade temperature at home, with recent outpatient diagnosis of UTI that was being treated with Keflex. Last bourne change 4/16/21 due to leak, changed every month. Last CAUTI in March at [facility]. h/o frequent uti  Reports low grade fever and abdominal discomfort, typical of symptoms during uti. Here has been on meropenem, up until yesterday with slightly elevated temperature, patient continues with abdominal discomfort. No flank pain    UCX enterobacter CRE, (S) to vabomere, repeat Ucx negative while on treatment  BCX (-)  per team bourne was exchanged in ED    suggest complete vabomere 2g every 8 hours IV x 7 days adjusted for renal function through 4/30/21  midline ordered  awaiting decision regarding home vs SILVINA-patient states he was told he can complete abx at hospital  will remain at risk for recurrent uti  on contact iso    d/w Dr Brown

## 2021-04-28 ENCOUNTER — TRANSCRIPTION ENCOUNTER (OUTPATIENT)
Age: 71
End: 2021-04-28

## 2021-04-28 LAB
ANION GAP SERPL CALC-SCNC: 12 MMOL/L — SIGNIFICANT CHANGE UP (ref 5–17)
BUN SERPL-MCNC: 37 MG/DL — HIGH (ref 8–20)
CALCIUM SERPL-MCNC: 9.1 MG/DL — SIGNIFICANT CHANGE UP (ref 8.6–10.2)
CHLORIDE SERPL-SCNC: 105 MMOL/L — SIGNIFICANT CHANGE UP (ref 98–107)
CO2 SERPL-SCNC: 25 MMOL/L — SIGNIFICANT CHANGE UP (ref 22–29)
CREAT SERPL-MCNC: 1.93 MG/DL — HIGH (ref 0.5–1.3)
GLUCOSE BLDC GLUCOMTR-MCNC: 117 MG/DL — HIGH (ref 70–99)
GLUCOSE BLDC GLUCOMTR-MCNC: 142 MG/DL — HIGH (ref 70–99)
GLUCOSE BLDC GLUCOMTR-MCNC: 193 MG/DL — HIGH (ref 70–99)
GLUCOSE SERPL-MCNC: 107 MG/DL — HIGH (ref 70–99)
HCT VFR BLD CALC: 25.5 % — LOW (ref 39–50)
HGB BLD-MCNC: 7.8 G/DL — LOW (ref 13–17)
MAGNESIUM SERPL-MCNC: 2.4 MG/DL — SIGNIFICANT CHANGE UP (ref 1.6–2.6)
MCHC RBC-ENTMCNC: 20.1 PG — LOW (ref 27–34)
MCHC RBC-ENTMCNC: 30.6 GM/DL — LOW (ref 32–36)
MCV RBC AUTO: 65.6 FL — LOW (ref 80–100)
PHOSPHATE SERPL-MCNC: 4.4 MG/DL — SIGNIFICANT CHANGE UP (ref 2.4–4.7)
PLATELET # BLD AUTO: 344 K/UL — SIGNIFICANT CHANGE UP (ref 150–400)
POTASSIUM SERPL-MCNC: 4.9 MMOL/L — SIGNIFICANT CHANGE UP (ref 3.5–5.3)
POTASSIUM SERPL-SCNC: 4.9 MMOL/L — SIGNIFICANT CHANGE UP (ref 3.5–5.3)
RBC # BLD: 3.89 M/UL — LOW (ref 4.2–5.8)
RBC # FLD: 21.3 % — HIGH (ref 10.3–14.5)
SODIUM SERPL-SCNC: 142 MMOL/L — SIGNIFICANT CHANGE UP (ref 135–145)
WBC # BLD: 5.69 K/UL — SIGNIFICANT CHANGE UP (ref 3.8–10.5)
WBC # FLD AUTO: 5.69 K/UL — SIGNIFICANT CHANGE UP (ref 3.8–10.5)

## 2021-04-28 PROCEDURE — 99232 SBSQ HOSP IP/OBS MODERATE 35: CPT

## 2021-04-28 RX ORDER — ERYTHROPOIETIN 10000 [IU]/ML
10000 INJECTION, SOLUTION INTRAVENOUS; SUBCUTANEOUS ONCE
Refills: 0 | Status: DISCONTINUED | OUTPATIENT
Start: 2021-04-28 | End: 2021-05-01

## 2021-04-28 RX ADMIN — SODIUM CHLORIDE 3 MILLILITER(S): 9 INJECTION INTRAMUSCULAR; INTRAVENOUS; SUBCUTANEOUS at 15:32

## 2021-04-28 RX ADMIN — SODIUM CHLORIDE 3 MILLILITER(S): 9 INJECTION INTRAMUSCULAR; INTRAVENOUS; SUBCUTANEOUS at 22:06

## 2021-04-28 RX ADMIN — MEROPENEM-VABORBACTAM 83.33 GRAM(S): 1; 1 INJECTION, POWDER, FOR SOLUTION INTRAVENOUS at 22:53

## 2021-04-28 RX ADMIN — MEROPENEM-VABORBACTAM 83.33 GRAM(S): 1; 1 INJECTION, POWDER, FOR SOLUTION INTRAVENOUS at 04:19

## 2021-04-28 RX ADMIN — MEROPENEM-VABORBACTAM 83.33 GRAM(S): 1; 1 INJECTION, POWDER, FOR SOLUTION INTRAVENOUS at 15:31

## 2021-04-28 RX ADMIN — PANTOPRAZOLE SODIUM 40 MILLIGRAM(S): 20 TABLET, DELAYED RELEASE ORAL at 15:31

## 2021-04-28 NOTE — PROGRESS NOTE ADULT - ASSESSMENT
69 y/o M with PMHx urinary retention due to BPH requiring chronic bourne for past 3 years, chronic anemia, CKD Stage III, DM II, HTN, metastatic SB ca (not on chemo/rad), b/l cataracts legally blind presents to ED with generalized weakness, low grade temperature at home, with recent outpatient diagnosis of UTI that was being treated with Keflex.     #Catheter associated UTI  - Chronic bourne was replaced in ED - pyuria on placement  - In ED received Cefepime and Vanco IV. Rocephin IV changed to Meropenem due to preliminary sensitivity reading of MDRO.    - Prelim urine culture + for Enterobacter cloacae  - all other cultures negative  - Repeat UCx: No growth.  - ID following  - Meropenem switched to Vabomere per ID. C/w until 4/30.     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 Lew office via [facility]    #Essential HTN  - Metoprolol 25mg oral BID    # DM II  -Patient on SS   -Glucose controlled on current regimen   -Elevated A1c - 7.4    #HARLEEN on CKD III  - Crt around baseline  - Baseline GFR 30-59  - Bourne in place   - Nephro following  - Monitor BMP    #Chronic anemia  -Baseline hemoglobin 8-9  -Patient hemodynamically stable    #Legally Blind   -b/l cataracts   -uses cane at baseline  -Lives at home with daughter Amelia     #DVT ppx: Heparin 5000U SQ q12  #GI ppx: Protonix 40mg oral before breakfast  #Diet: Diet to be changed to soft w/stated intolerance to starch   #Code Status: Full Code for now. Pt resistant in discussing GOC with myself and Palliative.   #Disposition: D/c after completion of IV abx.

## 2021-04-28 NOTE — DISCHARGE NOTE PROVIDER - PROVIDER TOKENS
PROVIDER:[TOKEN:[[id number]],FOLLOWUP:[2 weeks]],PROVIDER:[TOKEN:[[id number]],FOLLOWUP:[2 weeks]],FREE:[LAST:[Primary Care],PHONE:[(   )    -],FAX:[(   )    -],FOLLOWUP:[1 week]]

## 2021-04-28 NOTE — DISCHARGE NOTE PROVIDER - NSDCHHNEEDSERVICE_GEN_ALL_CORE
Medication teaching and assessment/Observation and assessment/Rehabilitation services/Teaching and training/Other, specify...

## 2021-04-28 NOTE — PHYSICAL THERAPY INITIAL EVALUATION ADULT - ADDITIONAL COMMENTS
pt states he lives with his daughter and her family in a 3 level house with 2 steps to enter (+rail), then stays on first floor. amb with cane in house. also has a RW, wc, and shower chair. has HHA 4 hours/day.

## 2021-04-28 NOTE — PROGRESS NOTE ADULT - SUBJECTIVE AND OBJECTIVE BOX
[facility] Division of Hospital Medicine  Pa Brown [phone]    Chief Complaint:  Patient is a 70y old  Male who presents with a chief complaint of Catheter associated UTI (28 Apr 2021 09:09)      SUBJECTIVE / OVERNIGHT EVENTS:  Pt seen and examined at bedside. No acute events reported overnight. No new complaints.    Patient denies chest pain, SOB, abd pain, N/V, fever, chills, dysuria or any other complaints. All remainder ROS negative.     MEDICATIONS  (STANDING):  dextrose 40% Gel 15 Gram(s) Oral once  dextrose 5%. 1000 milliLiter(s) (100 mL/Hr) IV Continuous <Continuous>  dextrose 5%. 1000 milliLiter(s) (50 mL/Hr) IV Continuous <Continuous>  dextrose 50% Injectable 25 Gram(s) IV Push once  dextrose 50% Injectable 12.5 Gram(s) IV Push once  dextrose 50% Injectable 25 Gram(s) IV Push once  epoetin daniel-epbx (RETACRIT) Injectable 55629 Unit(s) SubCutaneous once  ferrous    sulfate 325 milliGRAM(s) Oral daily  folic acid 1 milliGRAM(s) Oral daily  glucagon  Injectable 1 milliGRAM(s) IntraMuscular once  heparin   Injectable 5000 Unit(s) SubCutaneous every 12 hours  insulin lispro (ADMELOG) corrective regimen sliding scale   SubCutaneous three times a day before meals  insulin lispro (ADMELOG) corrective regimen sliding scale   SubCutaneous at bedtime  meropenem/vaborbactam IVPB 2 Gram(s) IV Intermittent every 8 hours  metoprolol tartrate 25 milliGRAM(s) Oral two times a day  multivitamin 1 Tablet(s) Oral daily  pantoprazole  Injectable 40 milliGRAM(s) IV Push daily  saccharomyces boulardii 250 milliGRAM(s) Oral two times a day  senna 2 Tablet(s) Oral daily  sodium bicarbonate 650 milliGRAM(s) Oral two times a day  sodium chloride 0.45%. 1000 milliLiter(s) (75 mL/Hr) IV Continuous <Continuous>  sodium chloride 0.45%. 1000 milliLiter(s) (75 mL/Hr) IV Continuous <Continuous>  sodium chloride 0.9% lock flush 3 milliLiter(s) IV Push every 8 hours  sucralfate 1 Gram(s) Oral four times a day  tamsulosin 0.8 milliGRAM(s) Oral at bedtime    MEDICATIONS  (PRN):  ondansetron Injectable 4 milliGRAM(s) IV Push every 6 hours PRN Nausea        I&O's Summary    27 Apr 2021 07:01  -  28 Apr 2021 07:00  --------------------------------------------------------  IN: 0 mL / OUT: 1300 mL / NET: -1300 mL        PHYSICAL EXAM:  Vital Signs Last 24 Hrs  T(C): 36.7 (28 Apr 2021 04:27), Max: 36.7 (27 Apr 2021 19:11)  T(F): 98 (28 Apr 2021 04:27), Max: 98 (27 Apr 2021 19:11)  HR: 75 (28 Apr 2021 04:27) (75 - 87)  BP: 132/77 (28 Apr 2021 04:27) (132/77 - 142/64)  BP(mean): --  RR: 19 (28 Apr 2021 04:27) (19 - 20)  SpO2: 96% (28 Apr 2021 04:27) (96% - 99%)      CONSTITUTIONAL: NAD  HEENT: NC/AT, legally blind  RESPIRATORY: CTA bilaterally, normal effort  CARDIOVASCULAR: RRR, S1/S2+, no m/g/r  ABDOMEN: Nontender to palpation, normoactive bowel sounds, no rebound/guarding; No hepatosplenomegaly  MUSCLOSKELETAL: No edema, cyanosis or deformities.  PSYCH: A+O to person, place, and time; affect appropriate  NEUROLOGY: CN 2-12 are intact and symmetric; no gross neurological deficits.  SKIN: No rashes; no palpable lesions  VASC: Distal pulses palpable    LABS:                        7.8    5.69  )-----------( 344      ( 28 Apr 2021 08:00 )             25.5     04-28    142  |  105  |  37.0<H>  ----------------------------<  107<H>  4.9   |  25.0  |  1.93<H>    Ca    9.1      28 Apr 2021 08:00  Phos  4.4     04-28  Mg     2.4     04-28    TPro  7.4  /  Alb  3.5  /  TBili  0.2<L>  /  DBili  x   /  AST  43<H>  /  ALT  17  /  AlkPhos  76  04-27              CAPILLARY BLOOD GLUCOSE      POCT Blood Glucose.: 142 mg/dL (28 Apr 2021 11:24)  POCT Blood Glucose.: 117 mg/dL (28 Apr 2021 07:52)  POCT Blood Glucose.: 188 mg/dL (27 Apr 2021 21:47)  POCT Blood Glucose.: 155 mg/dL (27 Apr 2021 16:49)        RADIOLOGY & ADDITIONAL TESTS:  Results Reviewed:   Imaging Personally Reviewed:  Electrocardiogram Personally Reviewed:

## 2021-04-28 NOTE — PROGRESS NOTE ADULT - SUBJECTIVE AND OBJECTIVE BOX
Patient is a 70y old  Male who presents with a chief complaint of Catheter associated UTI (24 Apr 2021 11:36)       HPI:  69 y/o male with history of urinary retention due to BPH with chronic indwelling bourne x 3 years, states last changed 3 days ago after it was leaking, HTN, HLD, legally blind, CKD-4 who lives at home with help from family for ADLs. He uses a walker at baseline. He was brought in for feeling generalized weakness earlier today and felt like he was going to pass out. Denies LOC, or falls. No CP, SOB, N/V. He was noted to have low grade temp at home and in ED was 100.6 orally. U/A off new bourne placed in ED with pyuria. No hypotension, no signs of new end organ damage. He was given Cefepime and Vancomycin. Currently feels back to baseline.  (19 Apr 2021 23:19) Was found to have HARLEEN by KDIGO criteria prompting renal consult.  PAtient sees Dr. Carr Outpatient.  Bourne catheter was changed in ER.  Has abdominal pain.  Denies any N/V/Diarrhea.      No c/o   Follow up HARLEEN/CKD    PAST MEDICAL & SURGICAL HISTORY:  Diabetes    Chronic anemia  baseilne Hbg 8-9    CKD (chronic kidney disease) stage 3, GFR 30-59 ml/min    Other secondary cataract of both eyes  legally blind    Urinary retention  chronic bourne    HTN (hypertension)    No significant past surgical history         FAMILY HISTORY:  Family history of diabetes mellitus (Mother)    NC    Social History:Non smoker    MEDICATIONS  (STANDING):  dextrose 40% Gel 15 Gram(s) Oral once  dextrose 5%. 1000 milliLiter(s) (100 mL/Hr) IV Continuous <Continuous>  dextrose 5%. 1000 milliLiter(s) (50 mL/Hr) IV Continuous <Continuous>  dextrose 50% Injectable 25 Gram(s) IV Push once  dextrose 50% Injectable 12.5 Gram(s) IV Push once  dextrose 50% Injectable 25 Gram(s) IV Push once  ferrous    sulfate 325 milliGRAM(s) Oral daily  folic acid 1 milliGRAM(s) Oral daily  glucagon  Injectable 1 milliGRAM(s) IntraMuscular once  heparin   Injectable 5000 Unit(s) SubCutaneous every 12 hours  insulin lispro (ADMELOG) corrective regimen sliding scale   SubCutaneous three times a day before meals  insulin lispro (ADMELOG) corrective regimen sliding scale   SubCutaneous at bedtime  meropenem/vaborbactam IVPB 2 Gram(s) IV Intermittent every 8 hours  metoprolol tartrate 25 milliGRAM(s) Oral two times a day  multivitamin 1 Tablet(s) Oral daily  pantoprazole  Injectable 40 milliGRAM(s) IV Push daily  saccharomyces boulardii 250 milliGRAM(s) Oral two times a day  senna 2 Tablet(s) Oral daily  sodium bicarbonate 650 milliGRAM(s) Oral two times a day  sodium chloride 0.45%. 1000 milliLiter(s) (75 mL/Hr) IV Continuous <Continuous>  sodium chloride 0.45%. 1000 milliLiter(s) (75 mL/Hr) IV Continuous <Continuous>  sodium chloride 0.9% lock flush 3 milliLiter(s) IV Push every 8 hours  sucralfate 1 Gram(s) Oral four times a day  tamsulosin 0.8 milliGRAM(s) Oral at bedtime    MEDICATIONS  (PRN):  ondansetron Injectable 4 milliGRAM(s) IV Push every 6 hours PRN Nausea    Allergies    amoxicillin (Rash)    Intolerances         REVIEW OF SYSTEMS:    Review of Systems:   Constitutional: Denies fatigue  HEENT: Denies headaches and dizziness  Respiratory: denies SOB, cough, or wheezing  Cardiovascular: denies CP, palpitations  Gastrointestinal: Denies nausea, denies vomiting, diarrhea, constipation, + abdominal pain  Genitourinary: denies painful urination, increased frequency, urgency, or bloody urine  Skin: denies rashes or itching  Musculoskeletal: denies muscle aches, joint swelling  Neurologic: Denies generalized weakness, denies loss of sensation, numbness, or tingling    Vital Signs Last 24 Hrs  T(C): 36.7 (28 Apr 2021 04:27), Max: 36.7 (27 Apr 2021 11:09)  T(F): 98 (28 Apr 2021 04:27), Max: 98.1 (27 Apr 2021 11:09)  HR: 75 (28 Apr 2021 04:27) (75 - 87)  BP: 132/77 (28 Apr 2021 04:27) (132/77 - 144/78)  BP(mean): --  RR: 19 (28 Apr 2021 04:27) (19 - 20)  SpO2: 96% (28 Apr 2021 04:27) (94% - 99%)    PHYSICAL EXAM:    GENERAL: NAD  HEAD:  Atraumatic, Normocephalic  EYES: Blind  ENMT: No Drainage from nares, No drainage from ears  NECK: Supple, neck  veins full  NERVOUS SYSTEM:  Awake and Alert  CHEST/LUNG: Clear to percussion bilaterally; No rales, rhonchi, wheezing, or rubs  HEART: Regular rate and rhythm; No murmurs, rubs, or gallops  ABDOMEN: Soft, Nontender, Nondistended; Bowel sounds present  EXTREMITIES:  No Edema  SKIN: No rashes No obvious ecchymosis  +bourne with light yellow urine      LABS:                                            7.8    5.69  )-----------( 344      ( 28 Apr 2021 08:00 )             25.5     04-28    142  |  105  |  37.0<H>  ----------------------------<  107<H>  4.9   |  25.0  |  1.93<H>    Ca    9.1      28 Apr 2021 08:00  Phos  4.4     04-28  Mg     2.4     04-28    TPro  7.4  /  Alb  3.5  /  TBili  0.2<L>  /  DBili  x   /  AST  43<H>  /  ALT  17  /  AlkPhos  76  04-27

## 2021-04-28 NOTE — PHYSICAL THERAPY INITIAL EVALUATION ADULT - MANUAL MUSCLE TESTING RESULTS, REHAB EVAL
bilateral shoulder flex 5/5, elbow flex 5/5, hip flex 4/5, knee ext 5/5, right ankle df 4/5, left ankle df 3-/5

## 2021-04-28 NOTE — DISCHARGE NOTE PROVIDER - NSDCCPCAREPLAN_GEN_ALL_CORE_FT
PRINCIPAL DISCHARGE DIAGNOSIS  Diagnosis: Sepsis secondary to UTI  Assessment and Plan of Treatment: - Completed treatment with Vabomere. Follow up with Urology and Nephrology.

## 2021-04-28 NOTE — DISCHARGE NOTE PROVIDER - NSDCMRMEDTOKEN_GEN_ALL_CORE_FT
ferrous sulfate 325 mg (65 mg elemental iron) oral tablet: 1 tab(s) orally once a day  metoprolol tartrate 25 mg oral tablet: 1 tab(s) orally 2 times a day  Multiple Vitamins oral tablet: 1 tab(s) orally once a day  senna oral tablet: 2 tab(s) orally once a day  sodium bicarbonate 650 mg oral tablet: 1 tab(s) orally 2 times a day  tamsulosin 0.4 mg oral capsule: 2 cap(s) orally once a day (at bedtime)

## 2021-04-28 NOTE — PHYSICAL THERAPY INITIAL EVALUATION ADULT - PERTINENT HX OF CURRENT PROBLEM, REHAB EVAL
71 y/o M with PMHx urinary retention due to BPH requiring chronic bourne for past 3 years, chronic anemia, CKD Stage III, DM II, HTN, metastatic SB ca (not on chemo/rad), b/l cataracts legally blind presents to ED with generalized weakness, low grade temperature at home, with recent outpatient diagnosis of UTI that was being treated with Keflex.

## 2021-04-28 NOTE — PHYSICAL THERAPY INITIAL EVALUATION ADULT - IMPAIRMENTS CONTRIBUTING TO GAIT DEVIATIONS, PT EVAL
cues and RW positioning due to decreased vision in unfamiliar environment/impaired balance/decreased strength

## 2021-04-28 NOTE — DISCHARGE NOTE PROVIDER - NSDCFUADDINST_GEN_ALL_CORE_FT
- Take medications as reconciled.  - Follow up with your PMD in 1 week.  - Follow up with Urology.  - Follow up with Nephrology.  - Follow up with Oncology  - You are currently stable for discharge, however if your condition worsens please seek immediate medical attention.

## 2021-04-28 NOTE — PROGRESS NOTE ADULT - ASSESSMENT
HARLEEN on CKD 3b  UTI  Chronic [place]  HTN  Anemia CKD  Hyperkalemia     -BLCr 1.7  -Renal function relatively stable near baseline   -Urine reviewed. Urine +urine eosinophil but clinically no AIN. This is likely due to UTI. We would not start steroid treatment   -CT abd -Moderate bilateral perinephric stranding. Left renal upper pole proteinaceous cyst and pole simple cyst. Mildly atrophic right kidney. No evidence of hydronephrosis or stone bilaterally.  -Abx per ID, currently on vabomere  -S/p gentle IVF  -S/p Lokelma x 1. Low K diet   -BP controlled  -oral iron; one dose EPO ordered  -PO bicarb as ordered for now  -Urology follow up outpatient with Dr. Anita LASSITER planning per primary team

## 2021-04-28 NOTE — PHYSICAL THERAPY INITIAL EVALUATION ADULT - ACTIVE RANGE OF MOTION EXAMINATION, REHAB EVAL
except left ankle df limited by strength/bilateral upper extremity Active ROM was WFL (within functional limits)/bilateral  lower extremity Active ROM was WFL (within functional limits)

## 2021-04-28 NOTE — DISCHARGE NOTE PROVIDER - CARE PROVIDER_API CALL
KELSI LAMB  Urology  [address], [place] [address]  Phone: [phone]  Fax: [phone]  Follow Up Time: 2 weeks    Harper Thapa)  Internal Medicine; Nephrology  [address], [address]  [place] [address]  Phone: [phone]  Fax: [phone]  Follow Up Time: 2 weeks    Primary Care,   Phone: (   )    -  Fax: (   )    -  Follow Up Time: 1 week

## 2021-04-28 NOTE — DISCHARGE NOTE PROVIDER - HOSPITAL COURSE
71 y/o M with PMHx urinary retention due to BPH requiring chronic bourne for past 3 years, chronic anemia, CKD Stage III, DM II, HTN, metastatic SB ca (not on chemo/rad), b/l cataracts legally blind presents to ED with generalized weakness, low grade temperature at home, with recent outpatient diagnosis of UTI that was being treated with Keflex. Patient was admitted for UTI 2/2 Enterobacter cloacae. Patient was treated w/ Ceftriaxone which was switched to Vabomere by ID based on sensitivities. Bourne was replaced in the ED. Nephrology was consulted for HARLEEN which was treated w/ IVFs and kidney function stabilized. Pt treated for hyperkalemia. CTH done for generalized weakness/AMS POA and negative for acute changes. Likely 2/2 metabolic encephalopathy in setting of UTI. Pt to receive Vabomere until  and midline was placed. Patient currently asymptomatic, mentation at baseline, and no longer requires inpatient hospitalization. Patient is currently stable for discharge with outpatient follow up.     D/c time spent coordinatin mins 71 y/o M with PMHx urinary retention due to BPH requiring chronic bourne for past 3 years, chronic anemia, CKD Stage III, DM II, HTN, metastatic SB ca (not on chemo/rad), b/l cataracts legally blind presents to ED with generalized weakness, low grade temperature at home, with recent outpatient diagnosis of UTI that was being treated with Keflex. Patient was admitted for UTI 2/2 Enterobacter cloacae. Patient was treated w/ Ceftriaxone which was switched to Vabomere by ID based on sensitivities. Bourne was replaced in the ED. Nephrology was consulted for HARLEEN which was treated w/ IVFs and kidney function stabilized. Pt treated for hyperkalemia with Lokelma.  CTH done for generalized weakness/AMS POA and negative for acute changes. Likely 2/2 metabolic encephalopathy in setting of UTI. Pt completed Vabomere treatment. Patient currently asymptomatic, mentation at baseline, and no longer requires inpatient hospitalization. Patient is currently stable for discharge w/ home care with outpatient follow up.     D/c time spent coordinatin mins

## 2021-04-29 LAB
GLUCOSE BLDC GLUCOMTR-MCNC: 139 MG/DL — HIGH (ref 70–99)
GLUCOSE BLDC GLUCOMTR-MCNC: 140 MG/DL — HIGH (ref 70–99)
GLUCOSE BLDC GLUCOMTR-MCNC: 155 MG/DL — HIGH (ref 70–99)
GLUCOSE BLDC GLUCOMTR-MCNC: 216 MG/DL — HIGH (ref 70–99)

## 2021-04-29 PROCEDURE — 99232 SBSQ HOSP IP/OBS MODERATE 35: CPT

## 2021-04-29 RX ADMIN — SODIUM CHLORIDE 3 MILLILITER(S): 9 INJECTION INTRAMUSCULAR; INTRAVENOUS; SUBCUTANEOUS at 05:06

## 2021-04-29 RX ADMIN — MEROPENEM-VABORBACTAM 83.33 GRAM(S): 1; 1 INJECTION, POWDER, FOR SOLUTION INTRAVENOUS at 09:55

## 2021-04-29 RX ADMIN — Medication 4: at 16:54

## 2021-04-29 RX ADMIN — SODIUM CHLORIDE 3 MILLILITER(S): 9 INJECTION INTRAMUSCULAR; INTRAVENOUS; SUBCUTANEOUS at 22:17

## 2021-04-29 RX ADMIN — MEROPENEM-VABORBACTAM 83.33 GRAM(S): 1; 1 INJECTION, POWDER, FOR SOLUTION INTRAVENOUS at 14:57

## 2021-04-29 RX ADMIN — MEROPENEM-VABORBACTAM 83.33 GRAM(S): 1; 1 INJECTION, POWDER, FOR SOLUTION INTRAVENOUS at 22:17

## 2021-04-29 RX ADMIN — PANTOPRAZOLE SODIUM 40 MILLIGRAM(S): 20 TABLET, DELAYED RELEASE ORAL at 11:31

## 2021-04-29 RX ADMIN — SODIUM CHLORIDE 3 MILLILITER(S): 9 INJECTION INTRAMUSCULAR; INTRAVENOUS; SUBCUTANEOUS at 13:26

## 2021-04-29 NOTE — PROGRESS NOTE ADULT - SUBJECTIVE AND OBJECTIVE BOX
Patient is a 70y old  Male who presents with a chief complaint of Catheter associated UTI (24 Apr 2021 11:36)       HPI:  69 y/o male with history of urinary retention due to BPH with chronic indwelling bourne x 3 years, states last changed 3 days ago after it was leaking, HTN, HLD, legally blind, CKD-4 who lives at home with help from family for ADLs. He uses a walker at baseline. He was brought in for feeling generalized weakness earlier today and felt like he was going to pass out. Denies LOC, or falls. No CP, SOB, N/V. He was noted to have low grade temp at home and in ED was 100.6 orally. U/A off new bourne placed in ED with pyuria. No hypotension, no signs of new end organ damage. He was given Cefepime and Vancomycin. Currently feels back to baseline.  (19 Apr 2021 23:19) Was found to have HARLEEN by KDIGO criteria prompting renal consult.  PAtient sees Dr. Carr Out[facility].  Bourne catheter was changed in ER.  Has abdominal pain.  Denies any N/V/Diarrhea.      No c/o   Follow up HARLEEN/CKD    PAST MEDICAL & SURGICAL HISTORY:  Diabetes    Chronic anemia  baseilne Hbg 8-9    CKD (chronic kidney disease) stage 3, GFR 30-59 ml/min    Other secondary cataract of both eyes  legally blind    Urinary retention  chronic bourne    HTN (hypertension)    No significant past surgical history         FAMILY HISTORY:  Family history of diabetes mellitus (Mother)    NC    Social History:Non smoker    MEDICATIONS  (STANDING):  dextrose 40% Gel 15 Gram(s) Oral once  dextrose 5%. 1000 milliLiter(s) (100 mL/Hr) IV Continuous <Continuous>  dextrose 5%. 1000 milliLiter(s) (50 mL/Hr) IV Continuous <Continuous>  dextrose 50% Injectable 25 Gram(s) IV Push once  dextrose 50% Injectable 12.5 Gram(s) IV Push once  dextrose 50% Injectable 25 Gram(s) IV Push once  ferrous    sulfate 325 milliGRAM(s) Oral daily  folic acid 1 milliGRAM(s) Oral daily  glucagon  Injectable 1 milliGRAM(s) IntraMuscular once  heparin   Injectable 5000 Unit(s) SubCutaneous every 12 hours  insulin lispro (ADMELOG) corrective regimen sliding scale   SubCutaneous three times a day before meals  insulin lispro (ADMELOG) corrective regimen sliding scale   SubCutaneous at bedtime  meropenem/vaborbactam IVPB 2 Gram(s) IV Intermittent every 8 hours  metoprolol tartrate 25 milliGRAM(s) Oral two times a day  multivitamin 1 Tablet(s) Oral daily  pantoprazole  Injectable 40 milliGRAM(s) IV Push daily  saccharomyces boulardii 250 milliGRAM(s) Oral two times a day  senna 2 Tablet(s) Oral daily  sodium bicarbonate 650 milliGRAM(s) Oral two times a day  sodium chloride 0.45%. 1000 milliLiter(s) (75 mL/Hr) IV Continuous <Continuous>  sodium chloride 0.45%. 1000 milliLiter(s) (75 mL/Hr) IV Continuous <Continuous>  sodium chloride 0.9% lock flush 3 milliLiter(s) IV Push every 8 hours  sucralfate 1 Gram(s) Oral four times a day  tamsulosin 0.8 milliGRAM(s) Oral at bedtime    MEDICATIONS  (PRN):  ondansetron Injectable 4 milliGRAM(s) IV Push every 6 hours PRN Nausea    Allergies    amoxicillin (Rash)    Intolerances         REVIEW OF SYSTEMS:    Review of Systems:   Constitutional: Denies fatigue  HEENT: Denies headaches and dizziness  Respiratory: denies SOB, cough, or wheezing  Cardiovascular: denies CP, palpitations  Gastrointestinal: Denies nausea, denies vomiting, diarrhea, constipation, + abdominal pain  Genitourinary: denies painful urination, increased frequency, urgency, or bloody urine  Skin: denies rashes or itching  Musculoskeletal: denies muscle aches, joint swelling  Neurologic: Denies generalized weakness, denies loss of sensation, numbness, or tingling    ICU Vital Signs Last 24 Hrs  T(C): 36.4 (29 Apr 2021 11:21), Max: 36.9 (28 Apr 2021 16:30)  T(F): 97.5 (29 Apr 2021 11:21), Max: 98.4 (28 Apr 2021 16:30)  HR: 72 (29 Apr 2021 11:21) (68 - 95)  BP: 163/83 (29 Apr 2021 11:21) (134/78 - 163/83)  BP(mean): --  ABP: --  ABP(mean): --  RR: 19 (29 Apr 2021 11:21) (18 - 19)  SpO2: 97% (29 Apr 2021 11:21) (97% - 97%)      PHYSICAL EXAM:    GENERAL: NAD  HEAD:  Atraumatic, Normocephalic  EYES: Blind  ENMT: No Drainage from nares, No drainage from ears  NECK: Supple, neck  veins full  NERVOUS SYSTEM:  Awake and Alert  CHEST/LUNG: Clear to percussion bilaterally; No rales, rhonchi, wheezing, or rubs  HEART: Regular rate and rhythm; No murmurs, rubs, or gallops  ABDOMEN: Soft, Nontender, Nondistended; Bowel sounds present  EXTREMITIES:  No Edema  SKIN: No rashes No obvious ecchymosis  +bourne with light yellow urine      LABS:                                                       7.8    5.69  )-----------( 344      ( 28 Apr 2021 08:00 )             25.5     04-28    142  |  105  |  37.0<H>  ----------------------------<  107<H>  4.9   |  25.0  |  1.93<H>    Ca    9.1      28 Apr 2021 08:00  Phos  4.4     04-28  Mg     2.4     04-28

## 2021-04-29 NOTE — PROGRESS NOTE ADULT - ASSESSMENT
Assessment and Plan:   · Assessment	  Catheter associated UTI  - Chronic bourne was replaced in ED - pyuria on placement  - In ED received Cefepime and Vanco IV. Rocephin   IV changed to Meropenem due to preliminary sensitivity reading of MDRO.    Isolated in private room   Prelim urine culture + for Enterobacter cloacae   ID following   Meropenem switched to Vabomere per ID. C/w until 4/30.     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 1.93    Metastatic Bowel CA -    CT scan appears to show duodenal cancer with mets to the liver -   We talked about his coming to an informed decision when deciding he would refuse chemotherapy and RT treatments  Risks outweighed benefits-he is at peace with his decision.    GOC  Treat acute infections or traumatic injury  Not pursuing curative chemo or RT treatments  Remains Full Code    Pt has been resistant  to discuss Advanced Directives-  specifically tapping into hospice benefit  Pending finishing IV ABX tomorrow .   Home with home care Pt refusing ISREAL Cisneros was named as  his HCP

## 2021-04-29 NOTE — PROGRESS NOTE ADULT - SUBJECTIVE AND OBJECTIVE BOX
[facility] Division of Hospital Medicine  Pa Borwn [phone]    Chief Complaint:  Patient is a 70y old  Male who presents with a chief complaint of Catheter associated UTI (28 Apr 2021 12:37)      SUBJECTIVE / OVERNIGHT EVENTS:  Pt seen and examined at bedside. No acute events reported overnight. No new complaints.    Patient denies chest pain, SOB, abd pain, N/V, fever, chills, dysuria or any other complaints. All remainder ROS negative.     MEDICATIONS  (STANDING):  dextrose 40% Gel 15 Gram(s) Oral once  dextrose 5%. 1000 milliLiter(s) (50 mL/Hr) IV Continuous <Continuous>  dextrose 5%. 1000 milliLiter(s) (100 mL/Hr) IV Continuous <Continuous>  dextrose 50% Injectable 25 Gram(s) IV Push once  dextrose 50% Injectable 12.5 Gram(s) IV Push once  dextrose 50% Injectable 25 Gram(s) IV Push once  epoetin daniel-epbx (RETACRIT) Injectable 00870 Unit(s) SubCutaneous once  ferrous    sulfate 325 milliGRAM(s) Oral daily  folic acid 1 milliGRAM(s) Oral daily  glucagon  Injectable 1 milliGRAM(s) IntraMuscular once  heparin   Injectable 5000 Unit(s) SubCutaneous every 12 hours  insulin lispro (ADMELOG) corrective regimen sliding scale   SubCutaneous three times a day before meals  insulin lispro (ADMELOG) corrective regimen sliding scale   SubCutaneous at bedtime  meropenem/vaborbactam IVPB 2 Gram(s) IV Intermittent every 8 hours  metoprolol tartrate 25 milliGRAM(s) Oral two times a day  multivitamin 1 Tablet(s) Oral daily  pantoprazole  Injectable 40 milliGRAM(s) IV Push daily  saccharomyces boulardii 250 milliGRAM(s) Oral two times a day  senna 2 Tablet(s) Oral daily  sodium bicarbonate 650 milliGRAM(s) Oral two times a day  sodium chloride 0.45%. 1000 milliLiter(s) (75 mL/Hr) IV Continuous <Continuous>  sodium chloride 0.45%. 1000 milliLiter(s) (75 mL/Hr) IV Continuous <Continuous>  sodium chloride 0.9% lock flush 3 milliLiter(s) IV Push every 8 hours  sucralfate 1 Gram(s) Oral four times a day  tamsulosin 0.8 milliGRAM(s) Oral at bedtime    MEDICATIONS  (PRN):  ondansetron Injectable 4 milliGRAM(s) IV Push every 6 hours PRN Nausea        I&O's Summary    28 Apr 2021 07:01  -  29 Apr 2021 07:00  --------------------------------------------------------  IN: 0 mL / OUT: 3200 mL / NET: -3200 mL    29 Apr 2021 07:01  -  29 Apr 2021 10:46  --------------------------------------------------------  IN: 250 mL / OUT: 0 mL / NET: 250 mL        PHYSICAL EXAM:  Vital Signs Last 24 Hrs  T(C): 36.6 (29 Apr 2021 05:09), Max: 36.9 (28 Apr 2021 16:30)  T(F): 97.9 (29 Apr 2021 05:09), Max: 98.4 (28 Apr 2021 16:30)  HR: 68 (29 Apr 2021 05:09) (68 - 95)  BP: 152/82 (29 Apr 2021 05:09) (134/78 - 152/82)  BP(mean): --  RR: 18 (29 Apr 2021 05:09) (18 - 18)  SpO2: 97% (29 Apr 2021 05:09) (97% - 97%)      CONSTITUTIONAL: NAD  HEENT: NC/AT, legally blind  RESPIRATORY: CTA bilaterally, normal effort  CARDIOVASCULAR: RRR, S1/S2+, no m/g/r  ABDOMEN: Nontender to palpation, normoactive bowel sounds, no rebound/guarding; No hepatosplenomegaly  MUSCLOSKELETAL: No edema, cyanosis or deformities.  PSYCH: A+O to person, place, and time; affect appropriate  NEUROLOGY: CN 2-12 are intact and symmetric; no gross neurological deficits.  SKIN: No rashes; no palpable lesions  VASC: Distal pulses palpable      LABS:                        7.8    5.69  )-----------( 344      ( 28 Apr 2021 08:00 )             25.5     04-28    142  |  105  |  37.0<H>  ----------------------------<  107<H>  4.9   |  25.0  |  1.93<H>    Ca    9.1      28 Apr 2021 08:00  Phos  4.4     04-28  Mg     2.4     04-28                CAPILLARY BLOOD GLUCOSE      POCT Blood Glucose.: 139 mg/dL (29 Apr 2021 07:37)  POCT Blood Glucose.: 193 mg/dL (28 Apr 2021 20:14)  POCT Blood Glucose.: 142 mg/dL (28 Apr 2021 11:24)        RADIOLOGY & ADDITIONAL TESTS:  Results Reviewed:   Imaging Personally Reviewed:  Electrocardiogram Personally Reviewed:

## 2021-04-29 NOTE — PROGRESS NOTE ADULT - ASSESSMENT
71 y/o M with PMHx urinary retention due to BPH requiring chronic bourne for past 3 years, chronic anemia, CKD Stage III, DM II, HTN, metastatic SB ca (not on chemo/rad), b/l cataracts legally blind presents to ED with generalized weakness, low grade temperature at home, with recent outpatient diagnosis of UTI that was being treated with Keflex.     #Catheter associated UTI  - Chronic bourne was replaced in ED - pyuria on placement  - In ED received Cefepime and Vanco IV. Rocephin IV changed to Meropenem due to preliminary sensitivity reading of MDRO.    - Prelim urine culture + for Enterobacter cloacae  - all other cultures negative  - Repeat UCx: No growth.  - ID following  - Meropenem switched to Vabomere per ID. C/w until 4/30.     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 Lew office via [facility]    #Essential HTN  - Metoprolol 25mg oral BID    # DM II  -Patient on SS   -Glucose controlled on current regimen   -Elevated A1c - 7.4    #HARLEEN on CKD III  - Crt around baseline  - Baseline GFR 30-59  - Bourne in place   - Nephro following  - Monitor BMP    #Chronic anemia  -Baseline hemoglobin 8-9  -Patient hemodynamically stable    #Legally Blind   -b/l cataracts   -uses cane at baseline  -Lives at home with daughter Amelia     #DVT ppx: Heparin 5000U SQ q12  #GI ppx: Protonix 40mg oral before breakfast  #Diet: Diet to be changed to soft w/stated intolerance to starch   #Code Status: Full Code for now. Pt resistant in discussing GOC with myself and Palliative.   #Disposition: D/c after completion of IV abx.

## 2021-04-29 NOTE — PROGRESS NOTE ADULT - SUBJECTIVE AND OBJECTIVE BOX
71 y/o M with PMHx urinary retention due to BPH. Admitted with Catheter associated UTI with chronic Bourne Catheter , chronic anemia, CKD Stage III, DM II, HTN, metastatic SB CA refused treatement at time of diagnosis.  Legally blind presents to ED with generalized weakness, low grade temperature recently diagnosed with UTI, failing oral antibiotics.      SUBJECTIVE / OVERNIGHT EVENTS:  F/U Note  No acute events reported overnight.   Isolation due to Enterobacter  Will stay in hospital until last dose of IV ABX given tomorrow  + BM yesterday  No new complaints.    Patient denies chest pain, SOB, abd pain, N/V, fever, chills, dysuria or any other complaints. All remainder ROS negative.     70y old  Male who presents with a chief complaint of Catheter associated UTI (29 Apr 2021 15:44)    Present Symptoms:     Dyspnea: 0 1   Nausea/Vomiting: No  Anxiety:   No  Depression:  No  Fatigue: Yes   Loss of appetite:  No    Pain: denies            Character-            Duration-            Effect-            Factors-            Frequency-            Location-            Severity-    Review of Systems: Reviewed                       All others negative    MEDICATIONS  (STANDING):  dextrose 40% Gel 15 Gram(s) Oral once  dextrose 5%. 1000 milliLiter(s) (100 mL/Hr) IV Continuous <Continuous>  dextrose 5%. 1000 milliLiter(s) (50 mL/Hr) IV Continuous <Continuous>  dextrose 50% Injectable 25 Gram(s) IV Push once  dextrose 50% Injectable 12.5 Gram(s) IV Push once  dextrose 50% Injectable 25 Gram(s) IV Push once  epoetin daniel-epbx (RETACRIT) Injectable 61687 Unit(s) SubCutaneous once  ferrous    sulfate 325 milliGRAM(s) Oral daily  folic acid 1 milliGRAM(s) Oral daily  glucagon  Injectable 1 milliGRAM(s) IntraMuscular once  heparin   Injectable 5000 Unit(s) SubCutaneous every 12 hours  insulin lispro (ADMELOG) corrective regimen sliding scale   SubCutaneous three times a day before meals  insulin lispro (ADMELOG) corrective regimen sliding scale   SubCutaneous at bedtime  meropenem/vaborbactam IVPB 2 Gram(s) IV Intermittent every 8 hours  metoprolol tartrate 25 milliGRAM(s) Oral two times a day  multivitamin 1 Tablet(s) Oral daily  pantoprazole  Injectable 40 milliGRAM(s) IV Push daily  saccharomyces boulardii 250 milliGRAM(s) Oral two times a day  senna 2 Tablet(s) Oral daily  sodium bicarbonate 650 milliGRAM(s) Oral two times a day  sodium chloride 0.45%. 1000 milliLiter(s) (75 mL/Hr) IV Continuous <Continuous>  sodium chloride 0.45%. 1000 milliLiter(s) (75 mL/Hr) IV Continuous <Continuous>  sodium chloride 0.9% lock flush 3 milliLiter(s) IV Push every 8 hours  sucralfate 1 Gram(s) Oral four times a day  tamsulosin 0.8 milliGRAM(s) Oral at bedtime    MEDICATIONS  (PRN):  ondansetron Injectable 4 milliGRAM(s) IV Push every 6 hours PRN Nausea    General: alert  oriented x _3__awake                verbally responsive and appropriate    HEENT: normal      Lungs: comfortable     CV: normal      GI: normal               : urinary retention chronic bourne    MSK:  weakness  edema             ambulatory with cane    Skin: normal   no rash    LABS:                        7.8    5.69  )-----------( 344      ( 28 Apr 2021 08:00 )             25.5     04-28    142  |  105  |  37.0<H>  ----------------------------<  107<H>  4.9   |  25.0  |  1.93<H>    Ca    9.1      28 Apr 2021 08:00  Phos  4.4     04-28  Mg     2.4     04-28  I&O's Summary    28 Apr 2021 07:01  -  29 Apr 2021 07:00  --------------------------------------------------------  IN: 0 mL / OUT: 3200 mL / NET: -3200 mL    29 Apr 2021 07:01  -  29 Apr 2021 17:40  --------------------------------------------------------  IN: 500 mL / OUT: 0 mL / NET: 500 mL    Vital Signs Last 24 Hrs  T(C): 36.6 (29 Apr 2021 16:56), Max: 36.6 (29 Apr 2021 05:09)  T(F): 97.8 (29 Apr 2021 16:56), Max: 97.9 (29 Apr 2021 05:09)  HR: 76 (29 Apr 2021 16:56) (68 - 76)  BP: 137/79 (29 Apr 2021 16:56) (137/79 - 163/83)  BP(mean): --  RR: 18 (29 Apr 2021 16:56) (18 - 19)  SpO2: 97% (29 Apr 2021 16:56) (97% - 97%)    RADIOLOGY & ADDITIONAL STUDIES:    ADVANCE DIRECTIVES:   DNR NO  Completed on:                     MOLST  NO   Completed on:  Living Will   NO   Completed on:    COUNSELING:    Face to face meeting to discuss Advanced Care Planning - Time Spent ______ Minutes.  See goals of care note.    More than 50% time spent in counseling and coordinating care. _10_____ Minutes.     Thank you for the opportunity to assist with the care of this patient.   [facility] [phone].

## 2021-04-30 ENCOUNTER — TRANSCRIPTION ENCOUNTER (OUTPATIENT)
Age: 71
End: 2021-04-30

## 2021-04-30 LAB
ANION GAP SERPL CALC-SCNC: 12 MMOL/L — SIGNIFICANT CHANGE UP (ref 5–17)
BUN SERPL-MCNC: 38 MG/DL — HIGH (ref 8–20)
CALCIUM SERPL-MCNC: 9.1 MG/DL — SIGNIFICANT CHANGE UP (ref 8.6–10.2)
CHLORIDE SERPL-SCNC: 103 MMOL/L — SIGNIFICANT CHANGE UP (ref 98–107)
CO2 SERPL-SCNC: 24 MMOL/L — SIGNIFICANT CHANGE UP (ref 22–29)
CREAT SERPL-MCNC: 1.91 MG/DL — HIGH (ref 0.5–1.3)
GLUCOSE BLDC GLUCOMTR-MCNC: 132 MG/DL — HIGH (ref 70–99)
GLUCOSE BLDC GLUCOMTR-MCNC: 140 MG/DL — HIGH (ref 70–99)
GLUCOSE BLDC GLUCOMTR-MCNC: 140 MG/DL — HIGH (ref 70–99)
GLUCOSE BLDC GLUCOMTR-MCNC: 141 MG/DL — HIGH (ref 70–99)
GLUCOSE SERPL-MCNC: 153 MG/DL — HIGH (ref 70–99)
HCT VFR BLD CALC: 24.5 % — LOW (ref 39–50)
HGB BLD-MCNC: 7.5 G/DL — LOW (ref 13–17)
MCHC RBC-ENTMCNC: 20.3 PG — LOW (ref 27–34)
MCHC RBC-ENTMCNC: 30.6 GM/DL — LOW (ref 32–36)
MCV RBC AUTO: 66.2 FL — LOW (ref 80–100)
PLATELET # BLD AUTO: 363 K/UL — SIGNIFICANT CHANGE UP (ref 150–400)
POTASSIUM SERPL-MCNC: 4.9 MMOL/L — SIGNIFICANT CHANGE UP (ref 3.5–5.3)
POTASSIUM SERPL-SCNC: 4.9 MMOL/L — SIGNIFICANT CHANGE UP (ref 3.5–5.3)
RBC # BLD: 3.7 M/UL — LOW (ref 4.2–5.8)
RBC # FLD: 21 % — HIGH (ref 10.3–14.5)
SODIUM SERPL-SCNC: 139 MMOL/L — SIGNIFICANT CHANGE UP (ref 135–145)
WBC # BLD: 6.2 K/UL — SIGNIFICANT CHANGE UP (ref 3.8–10.5)
WBC # FLD AUTO: 6.2 K/UL — SIGNIFICANT CHANGE UP (ref 3.8–10.5)

## 2021-04-30 PROCEDURE — 99232 SBSQ HOSP IP/OBS MODERATE 35: CPT

## 2021-04-30 RX ORDER — METOPROLOL TARTRATE 50 MG
1 TABLET ORAL
Qty: 60 | Refills: 0
Start: 2021-04-30 | End: 2021-05-29

## 2021-04-30 RX ORDER — SODIUM BICARBONATE 1 MEQ/ML
1 SYRINGE (ML) INTRAVENOUS
Qty: 60 | Refills: 0
Start: 2021-04-30 | End: 2021-05-29

## 2021-04-30 RX ORDER — SENNA PLUS 8.6 MG/1
2 TABLET ORAL
Qty: 60 | Refills: 0
Start: 2021-04-30 | End: 2021-05-29

## 2021-04-30 RX ORDER — TAMSULOSIN HYDROCHLORIDE 0.4 MG/1
2 CAPSULE ORAL
Qty: 60 | Refills: 0
Start: 2021-04-30 | End: 2021-05-29

## 2021-04-30 RX ORDER — FERROUS SULFATE 325(65) MG
1 TABLET ORAL
Qty: 30 | Refills: 0
Start: 2021-04-30 | End: 2021-05-29

## 2021-04-30 RX ADMIN — SODIUM CHLORIDE 3 MILLILITER(S): 9 INJECTION INTRAMUSCULAR; INTRAVENOUS; SUBCUTANEOUS at 05:49

## 2021-04-30 RX ADMIN — SODIUM CHLORIDE 3 MILLILITER(S): 9 INJECTION INTRAMUSCULAR; INTRAVENOUS; SUBCUTANEOUS at 22:16

## 2021-04-30 RX ADMIN — MEROPENEM-VABORBACTAM 83.33 GRAM(S): 1; 1 INJECTION, POWDER, FOR SOLUTION INTRAVENOUS at 15:02

## 2021-04-30 RX ADMIN — SODIUM CHLORIDE 3 MILLILITER(S): 9 INJECTION INTRAMUSCULAR; INTRAVENOUS; SUBCUTANEOUS at 13:22

## 2021-04-30 RX ADMIN — MEROPENEM-VABORBACTAM 83.33 GRAM(S): 1; 1 INJECTION, POWDER, FOR SOLUTION INTRAVENOUS at 22:16

## 2021-04-30 RX ADMIN — MEROPENEM-VABORBACTAM 83.33 GRAM(S): 1; 1 INJECTION, POWDER, FOR SOLUTION INTRAVENOUS at 06:26

## 2021-04-30 NOTE — PROGRESS NOTE ADULT - SUBJECTIVE AND OBJECTIVE BOX
[facility] Division of Hospital Medicine  Pa Brown [phone]    Chief Complaint:  Patient is a 70y old  Male who presents with a chief complaint of Catheter associated UTI (29 Apr 2021 17:40)      SUBJECTIVE / OVERNIGHT EVENTS:  Pt seen and examined at bedside. No acute events reported overnight. No new complaints.    Patient denies chest pain, SOB, abd pain, N/V, fever, chills, dysuria or any other complaints. All remainder ROS negative.     MEDICATIONS  (STANDING):  dextrose 40% Gel 15 Gram(s) Oral once  dextrose 5%. 1000 milliLiter(s) (100 mL/Hr) IV Continuous <Continuous>  dextrose 5%. 1000 milliLiter(s) (50 mL/Hr) IV Continuous <Continuous>  dextrose 50% Injectable 25 Gram(s) IV Push once  dextrose 50% Injectable 12.5 Gram(s) IV Push once  dextrose 50% Injectable 25 Gram(s) IV Push once  epoetin daniel-epbx (RETACRIT) Injectable 12263 Unit(s) SubCutaneous once  ferrous    sulfate 325 milliGRAM(s) Oral daily  folic acid 1 milliGRAM(s) Oral daily  glucagon  Injectable 1 milliGRAM(s) IntraMuscular once  heparin   Injectable 5000 Unit(s) SubCutaneous every 12 hours  insulin lispro (ADMELOG) corrective regimen sliding scale   SubCutaneous three times a day before meals  insulin lispro (ADMELOG) corrective regimen sliding scale   SubCutaneous at bedtime  meropenem/vaborbactam IVPB 2 Gram(s) IV Intermittent every 8 hours  metoprolol tartrate 25 milliGRAM(s) Oral two times a day  multivitamin 1 Tablet(s) Oral daily  pantoprazole  Injectable 40 milliGRAM(s) IV Push daily  saccharomyces boulardii 250 milliGRAM(s) Oral two times a day  senna 2 Tablet(s) Oral daily  sodium bicarbonate 650 milliGRAM(s) Oral two times a day  sodium chloride 0.45%. 1000 milliLiter(s) (75 mL/Hr) IV Continuous <Continuous>  sodium chloride 0.45%. 1000 milliLiter(s) (75 mL/Hr) IV Continuous <Continuous>  sodium chloride 0.9% lock flush 3 milliLiter(s) IV Push every 8 hours  sucralfate 1 Gram(s) Oral four times a day  tamsulosin 0.8 milliGRAM(s) Oral at bedtime    MEDICATIONS  (PRN):  ondansetron Injectable 4 milliGRAM(s) IV Push every 6 hours PRN Nausea        I&O's Summary    29 Apr 2021 07:01  -  30 Apr 2021 07:00  --------------------------------------------------------  IN: 500 mL / OUT: 650 mL / NET: -150 mL        PHYSICAL EXAM:  Vital Signs Last 24 Hrs  T(C): 36.7 (30 Apr 2021 04:44), Max: 36.7 (30 Apr 2021 04:44)  T(F): 98 (30 Apr 2021 04:44), Max: 98 (30 Apr 2021 04:44)  HR: 81 (30 Apr 2021 04:44) (72 - 81)  BP: 145/77 (30 Apr 2021 04:44) (137/79 - 163/83)  BP(mean): --  RR: 17 (30 Apr 2021 04:44) (17 - 19)  SpO2: 95% (30 Apr 2021 04:44) (95% - 97%)      CONSTITUTIONAL: NAD  HEENT: NC/AT, legally blind  RESPIRATORY: CTA bilaterally, normal effort  CARDIOVASCULAR: RRR, S1/S2+, no m/g/r  ABDOMEN: Nontender to palpation, normoactive bowel sounds, no rebound/guarding; No hepatosplenomegaly  MUSCLOSKELETAL: No edema, cyanosis or deformities.  PSYCH: A+O to person, place, and time; affect appropriate  NEUROLOGY: CN 2-12 are intact and symmetric; no gross neurological deficits.  SKIN: No rashes; no palpable lesions  VASC: Distal pulses palpable    LABS:                        7.5    6.20  )-----------( 363      ( 30 Apr 2021 06:57 )             24.5     04-30    139  |  103  |  38.0<H>  ----------------------------<  153<H>  4.9   |  24.0  |  1.91<H>    Ca    9.1      30 Apr 2021 06:57                CAPILLARY BLOOD GLUCOSE      POCT Blood Glucose.: 140 mg/dL (30 Apr 2021 07:49)  POCT Blood Glucose.: 155 mg/dL (29 Apr 2021 22:16)  POCT Blood Glucose.: 216 mg/dL (29 Apr 2021 16:48)  POCT Blood Glucose.: 140 mg/dL (29 Apr 2021 11:20)        RADIOLOGY & ADDITIONAL TESTS:  Results Reviewed:   Imaging Personally Reviewed:  Electrocardiogram Personally Reviewed:

## 2021-04-30 NOTE — PROGRESS NOTE ADULT - ASSESSMENT
71 y/o M with PMHx urinary retention due to BPH requiring chronic bourne for past 3 years, chronic anemia, CKD Stage III, DM II, HTN, metastatic SB ca (not on chemo/rad), b/l cataracts legally blind presents to ED with generalized weakness, low grade temperature at home, with recent outpatient diagnosis of UTI that was being treated with Keflex.     #Catheter associated UTI  - Chronic bourne was replaced in ED - pyuria on placement  - In ED received Cefepime and Vanco IV. Rocephin IV changed to Meropenem due to preliminary sensitivity reading of MDRO.    - Prelim urine culture + for Enterobacter cloacae  - all other cultures negative  - Repeat UCx: No growth.  - ID following  - Meropenem switched to Vabomere per ID. Will complete tonight.     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 Lew office via [facility]    #Essential HTN  - Metoprolol 25mg oral BID    # DM II  -Patient on SS   -Glucose controlled on current regimen   -Elevated A1c - 7.4    #HARLEEN on CKD III  - Crt around baseline  - Baseline GFR 30-59  - Bourne in place   - Nephro following  - Monitor BMP    #Chronic anemia  -Baseline hemoglobin 8-9  -Patient hemodynamically stable    #Legally Blind   -b/l cataracts   -uses cane at baseline  -Lives at home with daughter Amelia     #DVT ppx: Heparin 5000U SQ q12  #GI ppx: Protonix 40mg oral before breakfast  #Diet: Diet to be changed to soft w/stated intolerance to starch   #Code Status: Full Code for now. Pt resistant in discussing GOC with myself and Palliative.   #Disposition: Completes Abx tonight, d/c tomorrow.    69 y/o M with PMHx urinary retention due to BPH requiring chronic bourne for past 3 years, chronic anemia, CKD Stage III, DM II, HTN, metastatic SB ca (not on chemo/rad), b/l cataracts legally blind presents to ED with generalized weakness, low grade temperature at home, with recent outpatient diagnosis of UTI that was being treated with Keflex.     #Catheter associated UTI  - Chronic bourne was replaced in ED - pyuria on placement  - In ED received Cefepime and Vanco IV. Rocephin IV changed to Meropenem due to preliminary sensitivity reading of MDRO.    - Prelim urine culture + for Enterobacter cloacae  - all other cultures negative  - Repeat UCx: No growth.  - ID following  - Meropenem switched to Vabomere per ID. Will complete tonight.     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 Lew office via [facility]    #Essential HTN  - Metoprolol 25mg oral BID    # DM II  -Patient on SS   -Glucose controlled on current regimen   -Elevated A1c - 7.4    #HARLEEN on CKD III  - Crt around baseline  - Baseline GFR 30-59  - Bourne in place   - Nephro following  - Monitor BMP    #Chronic anemia  -Baseline hemoglobin 8-9  -Patient hemodynamically stable    #Legally Blind   -b/l cataracts   -uses cane at baseline  -Lives at home with erik Cisneros     #DVT ppx: Heparin 5000U SQ q12  #GI ppx: Protonix 40mg oral before breakfast  #Diet: Diet to be changed to soft w/stated intolerance to starch   #Code Status: Full Code for now. Pt resistant in discussing GOC with myself and Palliative.   #Disposition: Completes Abx tonight, d/c tomorrow.     Daughter Amelia updated.

## 2021-04-30 NOTE — PROGRESS NOTE ADULT - SUBJECTIVE AND OBJECTIVE BOX
INTERVAL HPI/OVERNIGHT EVENTS:   F/U Note:  No new events overnight  Awake alert, conversational  Good appetite,  +BM  Bourne to drainage bag- clear urine  Completing last IV Abx dose this evening  Discharge planned tomorrow with Palliative Home care plan.  Offers no complaints No fever or chills N/V/D constipation SOB or pain    70y old  Male who presents with a chief complaint of Catheter associated UTI (30 Apr 2021 11:02)    Present Symptoms:     Dyspnea: 0   Nausea/Vomiting: No  Anxiety:  No  Depression: No  Fatigue: Yes   Loss of appetite:  No    Pain: denies            Character-            Duration-            Effect-            Factors-            Frequency-            Location-            Severity-    Review of Systems: Reviewed              All others negative    MEDICATIONS  (STANDING):  dextrose 40% Gel 15 Gram(s) Oral once  dextrose 5%. 1000 milliLiter(s) (50 mL/Hr) IV Continuous <Continuous>  dextrose 5%. 1000 milliLiter(s) (100 mL/Hr) IV Continuous <Continuous>  dextrose 50% Injectable 25 Gram(s) IV Push once  dextrose 50% Injectable 12.5 Gram(s) IV Push once  dextrose 50% Injectable 25 Gram(s) IV Push once  epoetin daniel-epbx (RETACRIT) Injectable 05879 Unit(s) SubCutaneous once  ferrous    sulfate 325 milliGRAM(s) Oral daily  folic acid 1 milliGRAM(s) Oral daily  glucagon  Injectable 1 milliGRAM(s) IntraMuscular once  heparin   Injectable 5000 Unit(s) SubCutaneous every 12 hours  insulin lispro (ADMELOG) corrective regimen sliding scale   SubCutaneous three times a day before meals  insulin lispro (ADMELOG) corrective regimen sliding scale   SubCutaneous at bedtime  meropenem/vaborbactam IVPB 2 Gram(s) IV Intermittent every 8 hours  metoprolol tartrate 25 milliGRAM(s) Oral two times a day  multivitamin 1 Tablet(s) Oral daily  pantoprazole  Injectable 40 milliGRAM(s) IV Push daily  saccharomyces boulardii 250 milliGRAM(s) Oral two times a day  senna 2 Tablet(s) Oral daily  sodium bicarbonate 650 milliGRAM(s) Oral two times a day  sodium chloride 0.45%. 1000 milliLiter(s) (75 mL/Hr) IV Continuous <Continuous>  sodium chloride 0.45%. 1000 milliLiter(s) (75 mL/Hr) IV Continuous <Continuous>  sodium chloride 0.9% lock flush 3 milliLiter(s) IV Push every 8 hours  sucralfate 1 Gram(s) Oral four times a day  tamsulosin 0.8 milliGRAM(s) Oral at bedtime    MEDICATIONS  (PRN):  ondansetron Injectable 4 milliGRAM(s) IV Push every 6 hours PRN Nausea      General: alert  oriented x 3____                   overweight    HEENT: normal      Lungs: comfortable    CV: normal      GI: normal              :  bourne  (Chronic) clear urine    MSK:  weakness  edema             ambulatory cane and asst legally blind    Skin: normal  ___  no rash    LABS:                        7.5    6.20  )-----------( 363      ( 30 Apr 2021 06:57 )             24.5     04-30    139  |  103  |  38.0<H>  ----------------------------<  153<H>  4.9   |  24.0  |  1.91<H>    Ca    9.1      30 Apr 2021 06:57    I&O's Summary    29 Apr 2021 07:01  -  30 Apr 2021 07:00  --------------------------------------------------------  IN: 500 mL / OUT: 650 mL / NET: -150 mL    Vital Signs Last 24 Hrs  T(C): 36.7 (30 Apr 2021 04:44), Max: 36.7 (30 Apr 2021 04:44)  T(F): 98 (30 Apr 2021 04:44), Max: 98 (30 Apr 2021 04:44)  HR: 81 (30 Apr 2021 04:44) (76 - 81)  BP: 145/77 (30 Apr 2021 04:44) (137/79 - 145/77)  BP(mean): --  RR: 17 (30 Apr 2021 04:44) (17 - 18)  SpO2: 95% (30 Apr 2021 04:44) (95% - 97%)    RADIOLOGY & ADDITIONAL STUDIES:    ADVANCE DIRECTIVES: Spoke to Patient again about the improtance of discussing his end of life wishes with his daughter and HCP                                   I explained that DNR does not mean Do not treat. I strongly suggest he finish the directive at home with his daughter after discharge  DNR  NO  Completed on:                     MOLST  NO   Completed on:  Living Will   NO   Completed on:    COUNSELING:    Face to face meeting to discuss Advanced Care Planning - Time Spent _5_____ Minutes.  See goals of care note.    More than 50% time spent in counseling and coordinating care. ___15___ Minutes.     Thank you for the opportunity to assist with the care of this patient.   [facility] [phone].

## 2021-04-30 NOTE — PROGRESS NOTE ADULT - ASSESSMENT
HARLEEN on CKD 3b  UTI  Chronic Warren  HTN  Anemia CKD  Hyperkalemia     -BLCr 1.7  -Renal function is slowly improving, near baseline at this point  -Urine reviewed. Urine +urine eosinophil but clinically no AIN. This is likely due to UTI. We would not start steroid treatment   -CT abd -Moderate bilateral perinephric stranding. Left renal upper pole proteinaceous cyst and pole simple cyst. Mildly atrophic right kidney. No evidence of hydronephrosis or stone bilaterally.  -Abx per ID, currently on vabomere; to complete today  -S/p gentle IVF  -S/p Lokelma x 1. Low K diet   -BP controlled  -oral iron; one dose EPO ordered  -PO bicarb as ordered for now  -Urology follow up outpatient with Dr. Howard    Renally stable for DC; follow up outpatient

## 2021-04-30 NOTE — DISCHARGE NOTE NURSING/CASE MANAGEMENT/SOCIAL WORK - PATIENT PORTAL LINK FT
You can access the FollowMyHealth Patient Portal offered by [facility] by registering at the following website: [email or web address]. By joining [employer]’s FollowMyHealth portal, you will also be able to view your health information using other applications (apps) compatible with our system.

## 2021-04-30 NOTE — PROGRESS NOTE ADULT - ASSESSMENT
Assessment and Plan:   · Assessment	  Catheter associated UTI  - Chronic bourne was replaced in ED - pyuria on placement  - In ED received Cefepime and Vanco IV. Rocephin   IV changed to Meropenem due to preliminary sensitivity reading of MDRO.    Isolated in private room   Prelim urine culture + for Enterobacter cloacae   ID following   Meropenem switched to Vabomere per ID. C/w until 4/30 tonight     Constipation resolved Mag citrate    Metastatic Bowel CA -  Duodenal cancer with mets to the liver   No curative treatments  Risks outweighed benefits-he is at peace with his decision.  Home with Palliative homecare support    HARLEEN on CKD III  Baseline GFR 30-59 Bourne in place    Nephro following  Urinary retention Chronic Bourne Catheter  Bourne changed when admitted to the ED and 3weeks to monthly changes   Monitor BUN 38 Cr. 1.91 stable    GOC  Treat acute infections or traumatic injury  Not pursuing curative chemo or RT treatments  Remains Full Code    Pt has been resistant  to discuss Advanced Directives-  Briefly brought rationale up again, Provided MOLST Form to review with his daughter once he is at home  Original and copy of HCP  Pending finishing IV ABX tonight .   Home with home care Pt refusing ISREAL Cisneros was named as  his HCP

## 2021-05-01 VITALS
TEMPERATURE: 98 F | SYSTOLIC BLOOD PRESSURE: 142 MMHG | DIASTOLIC BLOOD PRESSURE: 80 MMHG | OXYGEN SATURATION: 98 % | RESPIRATION RATE: 17 BRPM | HEART RATE: 84 BPM

## 2021-05-01 LAB — GLUCOSE BLDC GLUCOMTR-MCNC: 151 MG/DL — HIGH (ref 70–99)

## 2021-05-01 PROCEDURE — 87040 BLOOD CULTURE FOR BACTERIA: CPT

## 2021-05-01 PROCEDURE — 85025 COMPLETE CBC W/AUTO DIFF WBC: CPT

## 2021-05-01 PROCEDURE — 83605 ASSAY OF LACTIC ACID: CPT

## 2021-05-01 PROCEDURE — 84443 ASSAY THYROID STIM HORMONE: CPT

## 2021-05-01 PROCEDURE — 82550 ASSAY OF CK (CPK): CPT

## 2021-05-01 PROCEDURE — 83036 HEMOGLOBIN GLYCOSYLATED A1C: CPT

## 2021-05-01 PROCEDURE — 84466 ASSAY OF TRANSFERRIN: CPT

## 2021-05-01 PROCEDURE — 85045 AUTOMATED RETICULOCYTE COUNT: CPT

## 2021-05-01 PROCEDURE — 85027 COMPLETE CBC AUTOMATED: CPT

## 2021-05-01 PROCEDURE — 80048 BASIC METABOLIC PNL TOTAL CA: CPT

## 2021-05-01 PROCEDURE — 0225U NFCT DS DNA&RNA 21 SARSCOV2: CPT

## 2021-05-01 PROCEDURE — 86769 SARS-COV-2 COVID-19 ANTIBODY: CPT

## 2021-05-01 PROCEDURE — 84300 ASSAY OF URINE SODIUM: CPT

## 2021-05-01 PROCEDURE — 86803 HEPATITIS C AB TEST: CPT

## 2021-05-01 PROCEDURE — 36415 COLL VENOUS BLD VENIPUNCTURE: CPT

## 2021-05-01 PROCEDURE — 87086 URINE CULTURE/COLONY COUNT: CPT

## 2021-05-01 PROCEDURE — 97116 GAIT TRAINING THERAPY: CPT

## 2021-05-01 PROCEDURE — 97163 PT EVAL HIGH COMPLEX 45 MIN: CPT

## 2021-05-01 PROCEDURE — 84540 ASSAY OF URINE/UREA-N: CPT

## 2021-05-01 PROCEDURE — 81001 URINALYSIS AUTO W/SCOPE: CPT

## 2021-05-01 PROCEDURE — 82570 ASSAY OF URINE CREATININE: CPT

## 2021-05-01 PROCEDURE — 71045 X-RAY EXAM CHEST 1 VIEW: CPT

## 2021-05-01 PROCEDURE — 74176 CT ABD & PELVIS W/O CONTRAST: CPT

## 2021-05-01 PROCEDURE — 82728 ASSAY OF FERRITIN: CPT

## 2021-05-01 PROCEDURE — 93005 ELECTROCARDIOGRAM TRACING: CPT

## 2021-05-01 PROCEDURE — 85730 THROMBOPLASTIN TIME PARTIAL: CPT

## 2021-05-01 PROCEDURE — 97530 THERAPEUTIC ACTIVITIES: CPT

## 2021-05-01 PROCEDURE — 83540 ASSAY OF IRON: CPT

## 2021-05-01 PROCEDURE — 87205 SMEAR GRAM STAIN: CPT

## 2021-05-01 PROCEDURE — 70450 CT HEAD/BRAIN W/O DYE: CPT

## 2021-05-01 PROCEDURE — 85610 PROTHROMBIN TIME: CPT

## 2021-05-01 PROCEDURE — 80053 COMPREHEN METABOLIC PANEL: CPT

## 2021-05-01 PROCEDURE — 82272 OCCULT BLD FECES 1-3 TESTS: CPT

## 2021-05-01 PROCEDURE — 84100 ASSAY OF PHOSPHORUS: CPT

## 2021-05-01 PROCEDURE — 99285 EMERGENCY DEPT VISIT HI MDM: CPT | Mod: 25

## 2021-05-01 PROCEDURE — 83735 ASSAY OF MAGNESIUM: CPT

## 2021-05-01 PROCEDURE — 87077 CULTURE AEROBIC IDENTIFY: CPT

## 2021-05-01 PROCEDURE — 87184 SC STD DISK METHOD PER PLATE: CPT

## 2021-05-01 PROCEDURE — 99238 HOSP IP/OBS DSCHRG MGMT 30/<: CPT

## 2021-05-01 PROCEDURE — 82962 GLUCOSE BLOOD TEST: CPT

## 2021-05-01 PROCEDURE — 83550 IRON BINDING TEST: CPT

## 2021-05-01 PROCEDURE — 87186 SC STD MICRODIL/AGAR DIL: CPT

## 2021-05-01 RX ADMIN — Medication 2: at 08:09

## 2021-05-01 NOTE — PROGRESS NOTE ADULT - NSICDXPILOT_GEN_ALL_CORE
[place]

## 2021-05-01 NOTE — PROGRESS NOTE ADULT - SUBJECTIVE AND OBJECTIVE BOX
Patient is a 70y old  Male who presents with a chief complaint of Catheter associated UTI (24 Apr 2021 11:36)       HPI:  71 y/o male with history of urinary retention due to BPH with chronic indwelling bourne x 3 years, states last changed 3 days ago after it was leaking, HTN, HLD, legally blind, CKD-4 who lives at home with help from family for ADLs. He uses a walker at baseline. He was brought in for feeling generalized weakness earlier today and felt like he was going to pass out. Denies LOC, or falls. No CP, SOB, N/V. He was noted to have low grade temp at home and in ED was 100.6 orally. U/A off new bourne placed in ED with pyuria. No hypotension, no signs of new end organ damage. He was given Cefepime and Vancomycin. Currently feels back to baseline.  (19 Apr 2021 23:19) Was found to have HARLEEN by KDIGO criteria prompting renal consult.  PAtient sees Dr. Carr Outpatient.  Bourne catheter was changed in ER.  Has abdominal pain.  Denies any N/V/Diarrhea.     Follow up HARLEEN/CKD  No complaints    PAST MEDICAL & SURGICAL HISTORY:  Diabetes    Chronic anemia  baseline Hbg 8-9    CKD (chronic kidney disease) stage 3, GFR 30-59 ml/min    Other secondary cataract of both eyes  legally blind    Urinary retention  chronic bourne    HTN (hypertension)    No significant past surgical history         FAMILY HISTORY:  Family history of diabetes mellitus (Mother)    NC    Social History:Non smoker    MEDICATIONS  (STANDING):  dextrose 40% Gel 15 Gram(s) Oral once  dextrose 5%. 1000 milliLiter(s) (100 mL/Hr) IV Continuous <Continuous>  dextrose 5%. 1000 milliLiter(s) (50 mL/Hr) IV Continuous <Continuous>  dextrose 50% Injectable 25 Gram(s) IV Push once  dextrose 50% Injectable 12.5 Gram(s) IV Push once  dextrose 50% Injectable 25 Gram(s) IV Push once  ferrous    sulfate 325 milliGRAM(s) Oral daily  folic acid 1 milliGRAM(s) Oral daily  glucagon  Injectable 1 milliGRAM(s) IntraMuscular once  heparin   Injectable 5000 Unit(s) SubCutaneous every 12 hours  insulin lispro (ADMELOG) corrective regimen sliding scale   SubCutaneous three times a day before meals  insulin lispro (ADMELOG) corrective regimen sliding scale   SubCutaneous at bedtime  meropenem/vaborbactam IVPB 2 Gram(s) IV Intermittent every 8 hours  metoprolol tartrate 25 milliGRAM(s) Oral two times a day  multivitamin 1 Tablet(s) Oral daily  pantoprazole  Injectable 40 milliGRAM(s) IV Push daily  saccharomyces boulardii 250 milliGRAM(s) Oral two times a day  senna 2 Tablet(s) Oral daily  sodium bicarbonate 650 milliGRAM(s) Oral two times a day  sodium chloride 0.45%. 1000 milliLiter(s) (75 mL/Hr) IV Continuous <Continuous>  sodium chloride 0.45%. 1000 milliLiter(s) (75 mL/Hr) IV Continuous <Continuous>  sodium chloride 0.9% lock flush 3 milliLiter(s) IV Push every 8 hours  sucralfate 1 Gram(s) Oral four times a day  tamsulosin 0.8 milliGRAM(s) Oral at bedtime    MEDICATIONS  (PRN):  ondansetron Injectable 4 milliGRAM(s) IV Push every 6 hours PRN Nausea    Allergies    amoxicillin (Rash)    Intolerances         REVIEW OF SYSTEMS:    Review of Systems:   Constitutional: Denies fatigue  HEENT: Denies headaches and dizziness  Respiratory: denies SOB, cough, or wheezing  Cardiovascular: denies CP, palpitations  Gastrointestinal: Denies nausea, denies vomiting, diarrhea, constipation, + abdominal pain  Genitourinary: denies painful urination, increased frequency, urgency, or bloody urine  Skin: denies rashes or itching  Musculoskeletal: denies muscle aches, joint swelling  Neurologic: Denies generalized weakness, denies loss of sensation, numbness, or tingling    Vital Signs Last 24 Hrs  T(C): 36.7 (01 May 2021 04:57), Max: 36.7 (01 May 2021 04:57)  T(F): 98 (01 May 2021 04:57), Max: 98 (01 May 2021 04:57)  HR: 73 (01 May 2021 04:57) (71 - 73)  BP: 145/73 (01 May 2021 04:57) (145/73 - 166/80)  BP(mean): --  RR: 18 (01 May 2021 04:57) (18 - 18)  SpO2: 96% (01 May 2021 04:57) (96% - 96%)    PHYSICAL EXAM:    GENERAL: NAD  HEAD:  Atraumatic, Normocephalic  EYES: Blind  ENMT: No Drainage from nares, No drainage from ears  NECK: Supple, neck  veins full  NERVOUS SYSTEM:  Awake and Alert  CHEST/LUNG: Clear to percussion bilaterally; No rales, rhonchi, wheezing, or rubs  HEART: Regular rate and rhythm; No murmurs, rubs, or gallops  ABDOMEN: Soft, Nontender, Nondistended; Bowel sounds present  EXTREMITIES:  No Edema  SKIN: No rashes No obvious ecchymosis  +bourne with light yellow urine      LABS:                        7.5    6.20  )-----------( 363      ( 30 Apr 2021 06:57 )             24.5     04-30    139  |  103  |  38.0<H>  ----------------------------<  153<H>  4.9   |  24.0  |  1.91<H>    Ca    9.1      30 Apr 2021 06:57

## 2021-05-01 NOTE — PROGRESS NOTE ADULT - ASSESSMENT
71 y/o M with PMHx urinary retention due to BPH requiring chronic bourne for past 3 years, chronic anemia, CKD Stage III, DM II, HTN, metastatic SB ca (not on chemo/rad), b/l cataracts legally blind presents to ED with generalized weakness, low grade temperature at home, with recent outpatient diagnosis of UTI that was being treated with Keflex.     #Catheter associated UTI  - Chronic bourne was replaced in ED - pyuria on placement  - In ED received Cefepime and Vanco IV. Rocephin IV changed to Meropenem due to preliminary sensitivity reading of MDRO.    - Prelim urine culture + for Enterobacter cloacae  - all other cultures negative  - Repeat UCx: No growth.  - ID following  - Meropenem switched to Vabomere per ID, completed abx last night.    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 Lew office via [facility]    #Essential HTN  - Metoprolol 25mg oral BID    # DM II  -Patient on SS   -Glucose controlled on current regimen   -Elevated A1c - 7.4    #HARLEEN on CKD III  - Crt around baseline  - Baseline GFR 30-59  - Bourne in place   - Nephro following, outpatient follow up    #Chronic anemia  -Baseline hemoglobin 7.3-9  -Patient hemodynamically stable    #Legally Blind   -b/l cataracts   -uses cane at baseline  -Lives at home with daughter Amelia     #DVT ppx: Heparin 5000U SQ q12  #GI ppx: Protonix 40mg oral before breakfast  #Diet: Diet to be changed to soft w/stated intolerance to starch   #Code Status: Full Code for now. Pt resistant in discussing GOC with Dr Brown and Palliative.   #Disposition: Completed Abx last night, discharge today

## 2021-05-01 NOTE — PROGRESS NOTE ADULT - ASSESSMENT
HARLEEN on CKD 3b  UTI  Chronic Warren  HTN  Anemia CKD  Hyperkalemia     -BLCr 1.7  -Renal function is slowly improving, near baseline at this point  -Urine reviewed. Urine +urine eosinophil but clinically no AIN. This is likely due to UTI. We would not start steroid treatment   -CT abd -Moderate bilateral perinephric stranding. Left renal upper pole proteinaceous cyst and pole simple cyst. Mildly atrophic right kidney. No evidence of hydronephrosis or stone bilaterally.  -Abx per ID, currently on vabomere; to complete today  -S/p gentle IVF  -S/p Lokelma x 1. Low K diet   -BP controlled  -oral iron; one dose EPO ordered  -DC oral bicarb     No renal objection for dc planning. Outpatient renal follow up   -Urology follow up outpatient with Dr. Howard    Renally stable for DC; follow up outpatient

## 2021-05-01 NOTE — PROGRESS NOTE ADULT - SUBJECTIVE AND OBJECTIVE BOX
[facility] Division of Hospital Medicine    Chief Complaint: no new symptoms.     SUBJECTIVE / OVERNIGHT EVENTS: no overnight events noted.    Patient denies chest pain, SOB, abd pain, N/V, fever, chills, dysuria or any other complaints. All remainder ROS negative.     MEDICATIONS  (STANDING):  dextrose 40% Gel 15 Gram(s) Oral once  dextrose 5%. 1000 milliLiter(s) (50 mL/Hr) IV Continuous <Continuous>  dextrose 5%. 1000 milliLiter(s) (100 mL/Hr) IV Continuous <Continuous>  dextrose 50% Injectable 25 Gram(s) IV Push once  dextrose 50% Injectable 12.5 Gram(s) IV Push once  dextrose 50% Injectable 25 Gram(s) IV Push once  epoetin daniel-epbx (RETACRIT) Injectable 31604 Unit(s) SubCutaneous once  ferrous    sulfate 325 milliGRAM(s) Oral daily  folic acid 1 milliGRAM(s) Oral daily  glucagon  Injectable 1 milliGRAM(s) IntraMuscular once  heparin   Injectable 5000 Unit(s) SubCutaneous every 12 hours  insulin lispro (ADMELOG) corrective regimen sliding scale   SubCutaneous three times a day before meals  insulin lispro (ADMELOG) corrective regimen sliding scale   SubCutaneous at bedtime  metoprolol tartrate 25 milliGRAM(s) Oral two times a day  multivitamin 1 Tablet(s) Oral daily  pantoprazole  Injectable 40 milliGRAM(s) IV Push daily  saccharomyces boulardii 250 milliGRAM(s) Oral two times a day  senna 2 Tablet(s) Oral daily  sodium bicarbonate 650 milliGRAM(s) Oral two times a day  sodium chloride 0.45%. 1000 milliLiter(s) (75 mL/Hr) IV Continuous <Continuous>  sodium chloride 0.45%. 1000 milliLiter(s) (75 mL/Hr) IV Continuous <Continuous>  sodium chloride 0.9% lock flush 3 milliLiter(s) IV Push every 8 hours  sucralfate 1 Gram(s) Oral four times a day  tamsulosin 0.8 milliGRAM(s) Oral at bedtime    MEDICATIONS  (PRN):  ondansetron Injectable 4 milliGRAM(s) IV Push every 6 hours PRN Nausea    PHYSICAL EXAM:  Vital Signs Last 24 Hrs  T(C): 36.7 (01 May 2021 04:57), Max: 36.7 (01 May 2021 04:57)  T(F): 98 (01 May 2021 04:57), Max: 98 (01 May 2021 04:57)  HR: 73 (01 May 2021 04:57) (71 - 73)  BP: 145/73 (01 May 2021 04:57) (145/73 - 166/80)  RR: 18 (01 May 2021 04:57) (18 - 18)  SpO2: 96% (01 May 2021 04:57) (96% - 96%)    CONSTITUTIONAL: NAD  HEENT: NC/AT, legally blind  RESPIRATORY: CTA bilaterally, normal effort  CARDIOVASCULAR: RRR, S1/S2+, no m/g/r  ABDOMEN: Nontender to palpation, normoactive bowel sounds, no rebound/guarding; No hepatosplenomegaly  NEUROLOGY: CN 2-12 are intact and symmetric; no gross neurological deficits.  SKIN: No rashes; no palpable lesions    LABS:                        7.5    6.20  )-----------( 363      ( 30 Apr 2021 06:57 )             24.5     04-30    139  |  103  |  38.0<H>  ----------------------------<  153<H>  4.9   |  24.0  |  1.91<H>    Ca    9.1      30 Apr 2021 06:57    POCT Blood Glucose.: 151 mg/dL (01 May 2021 07:16)  POCT Blood Glucose.: 140 mg/dL (30 Apr 2021 22:15)  POCT Blood Glucose.: 132 mg/dL (30 Apr 2021 16:07)  POCT Blood Glucose.: 141 mg/dL (30 Apr 2021 11:26)

## 2021-05-01 NOTE — PROGRESS NOTE ADULT - REASON FOR ADMISSION
Catheter associated UTI

## 2021-05-01 NOTE — PROGRESS NOTE ADULT - PROVIDER SPECIALTY LIST ADULT
Hospitalist
Hospitalist
Infectious Disease
Internal Medicine
Nephrology
Internal Medicine
Nephrology
Nephrology
Palliative Care
Palliative Care
Hospitalist
Internal Medicine
Nephrology
Hospitalist
Hospitalist
Infectious Disease
Internal Medicine
Nephrology
Hospitalist
Infectious Disease
Palliative Care
Palliative Care

## 2021-06-01 ENCOUNTER — EMERGENCY (EMERGENCY)
Facility: HOSPITAL | Age: 71
LOS: 1 days | Discharge: HOME | End: 2021-06-01
Attending: EMERGENCY MEDICINE
Payer: MEDICARE

## 2021-06-01 VITALS
TEMPERATURE: 99 F | SYSTOLIC BLOOD PRESSURE: 207 MMHG | RESPIRATION RATE: 20 BRPM | HEART RATE: 111 BPM | WEIGHT: 253.97 LBS | DIASTOLIC BLOOD PRESSURE: 123 MMHG | HEIGHT: 71 IN | OXYGEN SATURATION: 98 %

## 2021-06-01 VITALS
DIASTOLIC BLOOD PRESSURE: 77 MMHG | OXYGEN SATURATION: 95 % | SYSTOLIC BLOOD PRESSURE: 147 MMHG | RESPIRATION RATE: 18 BRPM | HEART RATE: 95 BPM

## 2021-06-01 PROCEDURE — 99283 EMERGENCY DEPT VISIT LOW MDM: CPT

## 2021-06-01 PROCEDURE — 99283 EMERGENCY DEPT VISIT LOW MDM: CPT | Mod: 25

## 2021-06-01 PROCEDURE — 51702 INSERT TEMP BLADDER CATH: CPT

## 2021-06-01 NOTE — ED ADULT TRIAGE NOTE - CHIEF COMPLAINT QUOTE
BIBA from home for  malfunctioning Warren cath since 10 am today . Abd distended . Legally blind. Hypertensive in Triage

## 2021-06-01 NOTE — ED PROVIDER NOTE - CONSTITUTIONAL, MLM
Well appearing, awake, alert, oriented to person, place, time/situation and in moderate distress. normal...

## 2021-06-01 NOTE — ED ADULT NURSE NOTE - HOW OFTEN DO YOU HAVE SIX OR MORE DRINKS ON ONE OCCASION?
Patient understands there is an increased risk of corneal edema after cataract surgery. Less than Monthly

## 2021-06-01 NOTE — ED PROVIDER NOTE - CLINICAL SUMMARY MEDICAL DECISION MAKING FREE TEXT BOX
Symptoms resolved with Warren replacement; vital signs improved; no fever; PMD or clinic follow up recommended for reassessment. Patient is aware of signs/symptoms to return to the emergency department.

## 2021-06-01 NOTE — ED ADULT TRIAGE NOTE - DOMESTIC TRAVEL HIGH RISK QUESTION
Patient advised today to (Hold) then resume ( Warfarin 5mg daily). Patient verbalized understanding.   No

## 2021-06-01 NOTE — ED ADULT NURSE REASSESSMENT NOTE - NS ED NURSE REASSESS COMMENT FT1
Bladder scan performed urine retention greater then 360 noted, MD made aware, orders for  bourne change received and carry out . New bourne cath [facility]16 place in - drained 1000ml of clear yellow urine

## 2021-06-01 NOTE — ED ADULT NURSE NOTE - INTERVENTIONS DEFINITIONS
[place] to call system/Call bell, personal items and telephone within reach/Instruct patient to call for assistance/Room bathroom lighting operational/Non-slip footwear when patient is off stretcher

## 2021-06-01 NOTE — CHART NOTE - NSCHARTNOTEFT_GEN_A_CORE
ANA Note: SW made aware pt is medically stable and will need ambulance home. SW placed call to pt's HCP/Daughter Amelia to discuss. Amelia confirmed address on facesheet is correct, reports pt gets aide services 5 days a wk/4hrs a day. 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 Pt also currently not in treatment for cancer at this time. Amelia reports there are 2 steps to enter home and she will be home to accept pt. Kanu arranged via [facility] ([place]), CRISTOBAL left on A-side  station with transport letter, ED provider made aware, no further SW services at this time

## 2021-06-01 NOTE — ED PROVIDER NOTE - PATIENT PORTAL LINK FT
You can access the FollowMyHealth Patient Portal offered by [facility] by registering at the following website: [email or web address]. By joining [employer]’s FollowMyHealth portal, you will also be able to view your health information using other applications (apps) compatible with our system.

## 2021-06-01 NOTE — ED PROVIDER NOTE - CARE PROVIDER_API CALL
Jaswant Fonseca  UROLOGY  [address] [address] [place] [address]  Phone: [phone]  Fax: [phone]  Follow Up Time:

## 2021-06-01 NOTE — ED ADULT NURSE NOTE - PMH
Chronic anemia  baseilne Hbg 8-9  CKD (chronic kidney disease) stage 3, GFR 30-59 ml/min    Diabetes    HTN (hypertension)    Other secondary cataract of both eyes  legally blind  Urinary retention  chronic bourne

## 2021-06-02 PROBLEM — R33.9 RETENTION OF URINE, UNSPECIFIED: Chronic | Status: ACTIVE | Noted: 2018-11-09

## 2021-06-02 PROBLEM — D64.9 ANEMIA, UNSPECIFIED: Chronic | Status: ACTIVE | Noted: 2018-11-09

## 2021-06-02 PROBLEM — H26.493 OTHER SECONDARY CATARACT, BILATERAL: Chronic | Status: ACTIVE | Noted: 2018-11-09

## 2021-06-02 PROBLEM — I10 ESSENTIAL (PRIMARY) HYPERTENSION: Chronic | Status: ACTIVE | Noted: 2021-04-19

## 2021-06-22 ENCOUNTER — EMERGENCY (EMERGENCY)
Facility: HOSPITAL | Age: 71
LOS: 1 days | Discharge: HOME | End: 2021-06-22
Attending: EMERGENCY MEDICINE
Payer: MEDICARE

## 2021-06-22 VITALS
DIASTOLIC BLOOD PRESSURE: 69 MMHG | RESPIRATION RATE: 18 BRPM | HEART RATE: 101 BPM | OXYGEN SATURATION: 98 % | TEMPERATURE: 98 F | SYSTOLIC BLOOD PRESSURE: 120 MMHG | HEIGHT: 71 IN

## 2021-06-22 PROCEDURE — 99283 EMERGENCY DEPT VISIT LOW MDM: CPT

## 2021-06-22 NOTE — ED PROVIDER NOTE - DOMESTIC TRAVEL HIGH RISK QUESTION
Note Text (......Xxx Chief Complaint.): This diagnosis correlates with the
Detail Level: Simple
Other (Free Text): 3 skin tags removed, no charge
No

## 2021-06-22 NOTE — ED PROVIDER NOTE - ATTENDING CONTRIBUTION TO CARE
I personally saw the patient with the PA, and completed the key components of the history and physical exam. I then discussed the management plan with the PA.   gen in nad resp clear cardia cno murmur abd soft neuro intact non toxic   has chronci colonizatrion no fever non toxic   paln to f/u cx befor eintiating any treatment   prior cx reviewed , pt agrees to plan of care

## 2021-06-22 NOTE — ED PROVIDER NOTE - NSFOLLOWUPINSTRUCTIONS_ED_ALL_ED_FT
Follow up with your primary medical doctor in 2-3 days   You will be called within 1-2 days if your urine culture if positive   Return to the ED for abdominal pain, fevers, chills, flank pain, vomiting or any new or concerning symptoms

## 2021-06-22 NOTE — ED PROVIDER NOTE - PATIENT PORTAL LINK FT
You can access the FollowMyHealth Patient Portal offered by [facility] by registering at the following website: [email or web address]. By joining [employer]’s FollowMyHealth portal, you will also be able to view your health information using other applications (apps) compatible with our system.

## 2021-06-22 NOTE — ED PROVIDER NOTE - OBJECTIVE STATEMENT
70 year old male with PMHx DM, liver CA, chronic bourne, presents to the ED for foul smelling urine which started today. Reports he has had chronic bourne for several years but is unsure of why. Also reports abd pain, N/V/D, fever, chills, hematuria, hematemesis. Pt lives alone but has help from aides 5 days a week per recent SW note from last month.

## 2021-06-22 NOTE — ED PROVIDER NOTE - CLINICAL SUMMARY MEDICAL DECISION MAKING FREE TEXT BOX
Pt with chronic bourne, foul smell urine per patient but no flank pain, fevers. Will check urine, culture, and change bourne.

## 2021-06-22 NOTE — ED PROVIDER NOTE - PROGRESS NOTE DETAILS
urine with mod leuks, pt afebrile here, denies abd pain, flank pain, dysuria. Will hold off treating urine for now, culture pending. Spoke with pts daughter, Amelia emergency contact, provided return precautions. Pt with large bowl movement prior to dc. Spoke with daughter Amelia again to tell her ambulance has been arranged for pickup at 7AM at hospital. Amelia will meet patient at his home to receive patient.

## 2021-06-23 LAB
APPEARANCE UR: ABNORMAL
BACTERIA # UR AUTO: ABNORMAL
BILIRUB UR-MCNC: NEGATIVE — SIGNIFICANT CHANGE UP
COLOR SPEC: YELLOW — SIGNIFICANT CHANGE UP
DIFF PNL FLD: ABNORMAL
EPI CELLS # UR: NEGATIVE — SIGNIFICANT CHANGE UP
GLUCOSE UR QL: 1000 MG/DL
KETONES UR-MCNC: NEGATIVE — SIGNIFICANT CHANGE UP
LEUKOCYTE ESTERASE UR-ACNC: ABNORMAL
NITRITE UR-MCNC: NEGATIVE — SIGNIFICANT CHANGE UP
PH UR: 6 — SIGNIFICANT CHANGE UP (ref 5–8)
PROT UR-MCNC: 100 MG/DL
RBC CASTS # UR COMP ASSIST: >50 /HPF (ref 0–4)
SP GR SPEC: 1 — LOW (ref 1.01–1.02)
UROBILINOGEN FLD QL: NEGATIVE MG/DL — SIGNIFICANT CHANGE UP
WBC UR QL: ABNORMAL

## 2021-06-23 PROCEDURE — 87086 URINE CULTURE/COLONY COUNT: CPT

## 2021-06-23 PROCEDURE — 87077 CULTURE AEROBIC IDENTIFY: CPT

## 2021-06-23 PROCEDURE — 74019 RADEX ABDOMEN 2 VIEWS: CPT

## 2021-06-23 PROCEDURE — 74019 RADEX ABDOMEN 2 VIEWS: CPT | Mod: 26

## 2021-06-23 PROCEDURE — 81001 URINALYSIS AUTO W/SCOPE: CPT

## 2021-06-23 PROCEDURE — 87186 SC STD MICRODIL/AGAR DIL: CPT

## 2021-06-23 PROCEDURE — 99283 EMERGENCY DEPT VISIT LOW MDM: CPT | Mod: 25

## 2021-06-23 NOTE — ED ADULT NURSE NOTE - OBJECTIVE STATEMENT
Patient presents to ED for c/o abdominal pain, states he has not had a BM since friday. prior to assessment, patient had large BM in bedside commode. patient still with abdominal pain, states "I think it is my bourne." hx of chronic bourne for the last 3 years, last changed three weeks ago.

## 2021-06-24 ENCOUNTER — EMERGENCY (EMERGENCY)
Facility: HOSPITAL | Age: 71
LOS: 1 days | Discharge: HOME | End: 2021-06-24
Attending: EMERGENCY MEDICINE
Payer: MEDICARE

## 2021-06-24 VITALS
RESPIRATION RATE: 18 BRPM | HEART RATE: 91 BPM | HEIGHT: 71 IN | DIASTOLIC BLOOD PRESSURE: 87 MMHG | SYSTOLIC BLOOD PRESSURE: 138 MMHG | TEMPERATURE: 98 F | OXYGEN SATURATION: 100 % | WEIGHT: 279.99 LBS

## 2021-06-24 LAB
A1C WITH ESTIMATED AVERAGE GLUCOSE RESULT: 9.4 % — HIGH (ref 4–5.6)
ALBUMIN SERPL ELPH-MCNC: 3.4 G/DL — SIGNIFICANT CHANGE UP (ref 3.3–5.2)
ALP SERPL-CCNC: 84 U/L — SIGNIFICANT CHANGE UP (ref 40–120)
ALT FLD-CCNC: 9 U/L — SIGNIFICANT CHANGE UP
ANION GAP SERPL CALC-SCNC: 13 MMOL/L — SIGNIFICANT CHANGE UP (ref 5–17)
ANISOCYTOSIS BLD QL: SIGNIFICANT CHANGE UP
AST SERPL-CCNC: 23 U/L — SIGNIFICANT CHANGE UP
BASE EXCESS BLDV CALC-SCNC: -6 MMOL/L — LOW (ref -2–3)
BASOPHILS # BLD AUTO: 0.07 K/UL — SIGNIFICANT CHANGE UP (ref 0–0.2)
BASOPHILS NFR BLD AUTO: 0.9 % — SIGNIFICANT CHANGE UP (ref 0–2)
BILIRUB SERPL-MCNC: 0.3 MG/DL — LOW (ref 0.4–2)
BUN SERPL-MCNC: 38.8 MG/DL — HIGH (ref 8–20)
CA-I SERPL-SCNC: 1.18 MMOL/L — SIGNIFICANT CHANGE UP (ref 1.15–1.33)
CALCIUM SERPL-MCNC: 8.6 MG/DL — SIGNIFICANT CHANGE UP (ref 8.6–10.2)
CHLORIDE BLDV-SCNC: 101 MMOL/L — SIGNIFICANT CHANGE UP (ref 98–107)
CHLORIDE SERPL-SCNC: 97 MMOL/L — LOW (ref 98–107)
CO2 SERPL-SCNC: 20 MMOL/L — LOW (ref 22–29)
CREAT SERPL-MCNC: 2.07 MG/DL — HIGH (ref 0.5–1.3)
DACRYOCYTES BLD QL SMEAR: SLIGHT — SIGNIFICANT CHANGE UP
ELLIPTOCYTES BLD QL SMEAR: SLIGHT — SIGNIFICANT CHANGE UP
EOSINOPHIL # BLD AUTO: 0.13 K/UL — SIGNIFICANT CHANGE UP (ref 0–0.5)
EOSINOPHIL NFR BLD AUTO: 1.7 % — SIGNIFICANT CHANGE UP (ref 0–6)
ESTIMATED AVERAGE GLUCOSE: 223 MG/DL — HIGH (ref 68–114)
GAS PNL BLDV: 131 MMOL/L — LOW (ref 136–145)
GAS PNL BLDV: SIGNIFICANT CHANGE UP
GIANT PLATELETS BLD QL SMEAR: PRESENT — SIGNIFICANT CHANGE UP
GLUCOSE BLDV-MCNC: 315 MG/DL — HIGH (ref 70–99)
GLUCOSE SERPL-MCNC: 298 MG/DL — HIGH (ref 70–99)
HCO3 BLDV-SCNC: 21 MMOL/L — LOW (ref 22–29)
HCT VFR BLD CALC: 22.2 % — LOW (ref 39–50)
HGB BLD-MCNC: 6.7 G/DL — CRITICAL LOW (ref 13–17)
HYPOCHROMIA BLD QL: SIGNIFICANT CHANGE UP
LACTATE BLDV-MCNC: 1.5 MMOL/L — SIGNIFICANT CHANGE UP (ref 0.5–2)
LYMPHOCYTES # BLD AUTO: 1.24 K/UL — SIGNIFICANT CHANGE UP (ref 1–3.3)
LYMPHOCYTES # BLD AUTO: 16.7 % — SIGNIFICANT CHANGE UP (ref 13–44)
MANUAL SMEAR VERIFICATION: SIGNIFICANT CHANGE UP
MCHC RBC-ENTMCNC: 19 PG — LOW (ref 27–34)
MCHC RBC-ENTMCNC: 30.2 GM/DL — LOW (ref 32–36)
MCV RBC AUTO: 62.9 FL — LOW (ref 80–100)
MICROCYTES BLD QL: SIGNIFICANT CHANGE UP
MONOCYTES # BLD AUTO: 1.24 K/UL — HIGH (ref 0–0.9)
MONOCYTES NFR BLD AUTO: 16.7 % — HIGH (ref 2–14)
NEUTROPHILS # BLD AUTO: 4.64 K/UL — SIGNIFICANT CHANGE UP (ref 1.8–7.4)
NEUTROPHILS NFR BLD AUTO: 62.3 % — SIGNIFICANT CHANGE UP (ref 43–77)
OB PNL STL: NEGATIVE — SIGNIFICANT CHANGE UP
PCO2 BLDV: 44 MMHG — SIGNIFICANT CHANGE UP (ref 42–55)
PH BLDV: 7.28 — LOW (ref 7.32–7.43)
PLAT MORPH BLD: NORMAL — SIGNIFICANT CHANGE UP
PLATELET # BLD AUTO: 243 K/UL — SIGNIFICANT CHANGE UP (ref 150–400)
PO2 BLDV: 87 MMHG — HIGH (ref 25–45)
POIKILOCYTOSIS BLD QL AUTO: SLIGHT — SIGNIFICANT CHANGE UP
POLYCHROMASIA BLD QL SMEAR: SLIGHT — SIGNIFICANT CHANGE UP
POTASSIUM BLDV-SCNC: 4.8 MMOL/L — SIGNIFICANT CHANGE UP (ref 3.5–5.1)
POTASSIUM SERPL-MCNC: 4.6 MMOL/L — SIGNIFICANT CHANGE UP (ref 3.5–5.3)
POTASSIUM SERPL-SCNC: 4.6 MMOL/L — SIGNIFICANT CHANGE UP (ref 3.5–5.3)
PROT SERPL-MCNC: 7.4 G/DL — SIGNIFICANT CHANGE UP (ref 6.6–8.7)
RBC # BLD: 3.53 M/UL — LOW (ref 4.2–5.8)
RBC # FLD: 20.3 % — HIGH (ref 10.3–14.5)
RBC BLD AUTO: ABNORMAL
SAO2 % BLDV: 99.3 % — SIGNIFICANT CHANGE UP
SCHISTOCYTES BLD QL AUTO: SLIGHT — SIGNIFICANT CHANGE UP
SODIUM SERPL-SCNC: 130 MMOL/L — LOW (ref 135–145)
VARIANT LYMPHS # BLD: 1.7 % — SIGNIFICANT CHANGE UP (ref 0–6)
WBC # BLD: 7.45 K/UL — SIGNIFICANT CHANGE UP (ref 3.8–10.5)
WBC # FLD AUTO: 7.45 K/UL — SIGNIFICANT CHANGE UP (ref 3.8–10.5)

## 2021-06-24 PROCEDURE — 99284 EMERGENCY DEPT VISIT MOD MDM: CPT

## 2021-06-24 RX ORDER — CEPHALEXIN 500 MG
500 CAPSULE ORAL ONCE
Refills: 0 | Status: COMPLETED | OUTPATIENT
Start: 2021-06-24 | End: 2021-06-24

## 2021-06-24 RX ORDER — IRON SUCROSE 20 MG/ML
200 INJECTION, SOLUTION INTRAVENOUS ONCE
Refills: 0 | Status: COMPLETED | OUTPATIENT
Start: 2021-06-24 | End: 2021-06-24

## 2021-06-24 RX ADMIN — Medication 500 MILLIGRAM(S): at 22:27

## 2021-06-24 NOTE — ED PROVIDER NOTE - PHYSICAL EXAMINATION
Const: Awake, alert and oriented. In no acute distress. Well appearing.  HEENT: NC/AT. Moist mucous membranes. Poor dentition with gingivitis.  Eyes: No scleral icterus. EOMI.  Neck:. Soft and supple. Full ROM without pain.  Cardiac: Regular rate and regular rhythm. +S1/S2. No murmurs. Peripheral pulses 2+ and symmetric. No LE edema.  Resp: Speaking in full sentences. No evidence of respiratory distress. No wheezes, rales or rhonchi.  Abd: Soft, non-tender, +distended. Normal bowel sounds in all 4 quadrants. No guarding or rebound. Warren catheter in place.  Back: Spine midline and non-tender. No CVAT.  Skin: No rashes, abrasions or lacerations.  Neuro: Awake, alert & oriented x 3. Moves all extremities symmetrically. Mildly decreased sensation of the right arm, no pronator drift, no LE drift, normal sensation b/l LE. Symmetrical sensation b/l face, symmetrical smile, tongue midline, speech clear.

## 2021-06-24 NOTE — ED PROVIDER NOTE - OBJECTIVE STATEMENT
69 y/o M with anemia, HTN, DM, CKD, metastatic cancer, non-compliant on medications, chronic indwelling bourne for urinary retention presents for right hand pain/numbness since waking up from a nap about 1 hour ago. 69 y/o M with anemia, HTN, DM, CKD, metastatic cancer, non-compliant on medications, chronic indwelling bourne for urinary retention presents for right hand pain/numbness since waking up from a nap about 1 hour ago. He has been here frequently for urinary symptoms, has often refused work ups and medications. He is non-compliant with any medications for blood pressure, blood sugar and opted not to treat his metastatic cancer. He denies any falls recently. He states he was told his blood sugar was high and thinks it is because he has been soaking his tooth in salt water and believes it may be infected.

## 2021-06-24 NOTE — ED POST DISCHARGE NOTE - DETAILS
Attempted to call number listed in chart but upon answer states is wrong number. Will send certified letter +[facility] needs abx, pending sensitivities, phone number listed in wrong, will send letter

## 2021-06-24 NOTE — ED ADULT TRIAGE NOTE - CHIEF COMPLAINT QUOTE
C/o right hand weakness. Denies recent injury. +Blindness. Hx of metastatic cancer, diabetes, and high blood pressure.

## 2021-06-24 NOTE — ED PROVIDER NOTE - NS ED ROS FT
Const: Denies fever, chills  HEENT: + tooth pain. Denies blurry vision, sore throat  Neck: Denies neck pain/stiffness  Resp: Denies coughing, SOB  Cardiovascular: Denies CP, palpitations, LE edema  GI: Denies nausea, vomiting, abdominal pain, diarrhea, constipation, blood in stool  : Denies urinary frequency/urgency/dysuria, hematuria  MSK: Denies back pain  Neuro: + right arm numbness. Denies HA, dizziness,  weakness  Skin: Denies rashes.

## 2021-06-24 NOTE — ED POST DISCHARGE NOTE - RESULT SUMMARY
Nonobstructive bowel gas pattern/constipation with no small bowel dilatation. Small bowel mass described on CT

## 2021-06-24 NOTE — ED PROVIDER NOTE - NSFOLLOWUPINSTRUCTIONS_ED_ALL_ED_FT
- Follow up with your doctor within 2-3 days.   - Bring results with you to the appointment.   - Return to the ED for any new or worsening symptoms.       Anemia    Anemia is a condition in which the concentration of red blood cells or hemoglobin in the blood is below normal. Hemoglobin is a substance in red blood cells that carries oxygen to the tissues of the body. Anemia results in not enough oxygen reaching these tissues which can cause symptoms such as weakness, dizziness/lightheadedness, shortness of breath, chest pain, paleness, or nausea. The cause of your anemia may or may not be determined immediately. If your hemoglobin was dangerously low, you may have received a blood transfusion. Usually reactions to transfusions occur immediately but monitor yourself for any fevers, rash, or shortness of breath.    SEEK IMMEDIATE MEDICAL CARE IF YOU HAVE ANY OF THE FOLLOWING SYMPTOMS: extreme weakness/chest pain/shortness of breath, black or bloody stools, vomiting blood, fainting, fever, or any signs of dehydration.

## 2021-06-24 NOTE — ED PROVIDER NOTE - CLINICAL SUMMARY MEDICAL DECISION MAKING FREE TEXT BOX
69 y/o M with untreated metastatic Ca, DM, HTN presents for right hand numbness after waking up from a nap. He was found to have anemia with hgb of 6.7, refuses blood transfusion, requests iron infusion. CT head/C/S to evaluate for intracranial vs. metastatic cause of hand numbness, no obvious neuro deficit.

## 2021-06-24 NOTE — ED PROVIDER NOTE - PROGRESS NOTE DETAILS
Jessica: I shared results with patient. He states that he has been lower than this before, does not want a blood transfusion, usually gets an iron infusion. Rectal exam performed.    Rectal: Normal external rectum without lesions or hemorrhoids. Normal tone. Normal brown stool in rectal vault without blood. No internal hemorrhoids appreciated. Normal rectal tone.

## 2021-06-25 VITALS
HEART RATE: 85 BPM | RESPIRATION RATE: 18 BRPM | OXYGEN SATURATION: 99 % | DIASTOLIC BLOOD PRESSURE: 78 MMHG | SYSTOLIC BLOOD PRESSURE: 155 MMHG

## 2021-06-25 PROCEDURE — 83605 ASSAY OF LACTIC ACID: CPT

## 2021-06-25 PROCEDURE — 84295 ASSAY OF SERUM SODIUM: CPT

## 2021-06-25 PROCEDURE — 82803 BLOOD GASES ANY COMBINATION: CPT

## 2021-06-25 PROCEDURE — 72125 CT NECK SPINE W/O DYE: CPT

## 2021-06-25 PROCEDURE — 83036 HEMOGLOBIN GLYCOSYLATED A1C: CPT

## 2021-06-25 PROCEDURE — 70450 CT HEAD/BRAIN W/O DYE: CPT

## 2021-06-25 PROCEDURE — 36415 COLL VENOUS BLD VENIPUNCTURE: CPT

## 2021-06-25 PROCEDURE — 85014 HEMATOCRIT: CPT

## 2021-06-25 PROCEDURE — 82947 ASSAY GLUCOSE BLOOD QUANT: CPT

## 2021-06-25 PROCEDURE — 72125 CT NECK SPINE W/O DYE: CPT | Mod: 26,MH

## 2021-06-25 PROCEDURE — 80053 COMPREHEN METABOLIC PANEL: CPT

## 2021-06-25 PROCEDURE — 82330 ASSAY OF CALCIUM: CPT

## 2021-06-25 PROCEDURE — 70450 CT HEAD/BRAIN W/O DYE: CPT | Mod: 26,MH

## 2021-06-25 PROCEDURE — 82272 OCCULT BLD FECES 1-3 TESTS: CPT

## 2021-06-25 PROCEDURE — 85025 COMPLETE CBC W/AUTO DIFF WBC: CPT

## 2021-06-25 PROCEDURE — 99284 EMERGENCY DEPT VISIT MOD MDM: CPT | Mod: 25

## 2021-06-25 PROCEDURE — 96374 THER/PROPH/DIAG INJ IV PUSH: CPT

## 2021-06-25 PROCEDURE — 82435 ASSAY OF BLOOD CHLORIDE: CPT

## 2021-06-25 PROCEDURE — 85018 HEMOGLOBIN: CPT

## 2021-06-25 PROCEDURE — 84132 ASSAY OF SERUM POTASSIUM: CPT

## 2021-06-25 RX ORDER — CEPHALEXIN 500 MG
1 CAPSULE ORAL
Qty: 14 | Refills: 0
Start: 2021-06-25 | End: 2021-07-01

## 2021-06-25 RX ADMIN — IRON SUCROSE 110 MILLIGRAM(S): 20 INJECTION, SOLUTION INTRAVENOUS at 00:34

## 2021-06-25 NOTE — ED ADULT NURSE NOTE - OBJECTIVE STATEMENT
pt presents with R hand numbness and pain. strength equal bilaterally. MOIZ. pt with hgb of 6.7, refusing blood products, requesting iron. iron given. pt stable for d/c as per MD. CM in place through out stay, NSR. 100% on RA.

## 2021-07-06 RX ORDER — CIPROFLOXACIN LACTATE 400MG/40ML
1 VIAL (ML) INTRAVENOUS
Qty: 5 | Refills: 0
Start: 2021-07-06 | End: 2021-07-10

## 2021-07-06 NOTE — ED POST DISCHARGE NOTE - DETAILS
Abx sent to pharmacy. Advised to follow up with PCP and return to ED for any new or worsening symptoms.

## 2021-09-09 NOTE — DIETITIAN INITIAL EVALUATION ADULT. - NUTRITION DIAGNOSTIC TERMINOLOGY #1
Case Management (peer coverage): 21    Writer received notification from insurance of covered SNF facilities if needed; forwarded to covering GUCCI Peñaloza.    Per MD Alfie note, pt may dc to home with roommate instead. Writer spoke to pt and father regarding home care. Pt is agreeable to home care services. Choice offered and disclosed [place] affiliation; pt chose [facility] for home care services. Explained the meaning of home bound status. Address, phone number verified in Epic. AVS completed in anticipation of home care orders being placed. Pt states he has MTM rides, however his father or aunt would provide transportation at DC.     MD to enter Service To Home Care orders for RN, PT, OT as appropriate for dc to home.     Writer reviewed GUCCI patterson's note from yesterday regarding POA. Pt brought it up to writer that he would like to elect his father as his POA. He confirms current POA document is not active. Per review of POA document, pt's father Lucas is listed as primary. Pt did not mention his wife. Writer updated covering  Anabela.    Lakshmi Esteves RN  Case Management - Float  Office/: [phone]  Pager: [phone] (M-F [phone])    Coverin21       Weight

## 2021-10-19 NOTE — PHYSICAL THERAPY INITIAL EVALUATION ADULT - PATIENT/FAMILY/SIGNIFICANT OTHER GOALS STATEMENT, PT EVAL
Well-Child Checkup: 15 to 18 Years  During the teen years, it’s important to keep having yearly checkups. Your teen may be embarrassed about having a checkup. Reassure your teen that the exam is normal and necessary.  Be aware that the healthcare provider on other parts of the body. Girls grow breasts and menstruate (have monthly periods). A boy’s voice changes, becoming lower and deeper. As the penis matures, erections and wet dreams will start to happen.  Talk to your teen about what to expect, and help hi even lunch. Not only is this unhealthy, it can also hurt school performance. Make sure your teen eats breakfast. If your teen does not like the food served at school for lunch, allow him or her to prepare a bag lunch.   · Have at least one family meal with tips  Recommendations to keep your teen safe include the following:   · Set rules for how your teen can spend time outside of the house. Give your child a nighttime curfew.  If your child has a cell phone, check in periodically by calling to ask where he or swings as a result of their changing hormones. It’s also just a part of growing up. But sometimes a teenager’s mood swings are signs of a larger problem. If your teen seems depressed for more than 2 weeks, you should be concerned.  Signs of depression inclu a sign of other problems. · Friendships. Do you like your child’s friends? Do the friendships seem healthy? Make sure to talk to your teen about who his or her friends are and how they spend time together. Peer pressure can be a problem among teenagers. makes his or her own decisions about what to eat and how to spend free time. You can’t always have the final say, but you can encourage healthy habits. Your teen should:   · Get at least 30 to 60 minutes of physical activity every day.  This time can be bro shower daily and use deodorant. · Let the healthcare provider know if you or your teen have questions about hygiene or acne. · Bring your teen to the dentist at least twice a year for teeth cleaning and a checkup.   · Remind your teen to brush and floss h your teen is old enough for a ’s license, encourage safe driving. Teach your teen to always wear a seat belt, drive the speed limit, and follow the rules of the road. Do not allow your teenager to text or talk on a cell phone while driving.  (And don’ can be eased. Offer your love and support. If your teen talks about death or suicide, seek help right away. Alicia last reviewed this educational content on 4/1/2020  © 3465-9043 [employer]. All rights reserved.  This information is not i Pt would like to return home and find his shoes before he leaves- RN informed that pt shoes are not at bedside, in drawers or in closet.

## 2021-10-22 ENCOUNTER — EMERGENCY (EMERGENCY)
Facility: HOSPITAL | Age: 71
LOS: 1 days | Discharge: HOME | End: 2021-10-22
Attending: EMERGENCY MEDICINE
Payer: MEDICARE

## 2021-10-22 VITALS
OXYGEN SATURATION: 98 % | TEMPERATURE: 100 F | HEART RATE: 95 BPM | SYSTOLIC BLOOD PRESSURE: 133 MMHG | DIASTOLIC BLOOD PRESSURE: 77 MMHG | HEIGHT: 71 IN | RESPIRATION RATE: 18 BRPM

## 2021-10-22 LAB
APPEARANCE UR: CLEAR — SIGNIFICANT CHANGE UP
BACTERIA # UR AUTO: ABNORMAL
BILIRUB UR-MCNC: NEGATIVE — SIGNIFICANT CHANGE UP
COLOR SPEC: YELLOW — SIGNIFICANT CHANGE UP
DIFF PNL FLD: ABNORMAL
EPI CELLS # UR: SIGNIFICANT CHANGE UP
GLUCOSE UR QL: 50 MG/DL
KETONES UR-MCNC: NEGATIVE — SIGNIFICANT CHANGE UP
LEUKOCYTE ESTERASE UR-ACNC: ABNORMAL
NITRITE UR-MCNC: NEGATIVE — SIGNIFICANT CHANGE UP
PH UR: 6 — SIGNIFICANT CHANGE UP (ref 5–8)
PROT UR-MCNC: 100 MG/DL
RBC CASTS # UR COMP ASSIST: ABNORMAL /HPF (ref 0–4)
SP GR SPEC: 1.01 — SIGNIFICANT CHANGE UP (ref 1.01–1.02)
UROBILINOGEN FLD QL: NEGATIVE MG/DL — SIGNIFICANT CHANGE UP
WBC UR QL: ABNORMAL

## 2021-10-22 PROCEDURE — 51702 INSERT TEMP BLADDER CATH: CPT

## 2021-10-22 PROCEDURE — 99283 EMERGENCY DEPT VISIT LOW MDM: CPT

## 2021-10-22 PROCEDURE — 87086 URINE CULTURE/COLONY COUNT: CPT

## 2021-10-22 PROCEDURE — 87077 CULTURE AEROBIC IDENTIFY: CPT

## 2021-10-22 PROCEDURE — 81001 URINALYSIS AUTO W/SCOPE: CPT

## 2021-10-22 PROCEDURE — 99283 EMERGENCY DEPT VISIT LOW MDM: CPT | Mod: 25

## 2021-10-22 PROCEDURE — 87186 SC STD MICRODIL/AGAR DIL: CPT

## 2021-10-22 RX ORDER — MOXIFLOXACIN HYDROCHLORIDE TABLETS, 400 MG 400 MG/1
1 TABLET, FILM COATED ORAL
Qty: 20 | Refills: 0
Start: 2021-10-22 | End: 2021-10-31

## 2021-10-22 NOTE — ED PROVIDER NOTE - PROGRESS NOTE DETAILS
bourne changed from 20 to 18, small sediment and blood clot noted, will send UA/UC, patient has h/o enterobacter UC > 100.  Patient has established visiting RN monthly to change bourne.  Urologist Joseph. : Patient's son at beside for . Patient had bourne changed to leg bag. Patient feeling bladder full and can't urinate. Bourne bag had clear yellow urine with good drainage. Bladder scan 0. patient kept requesting bourne to be flushed but bourne is not a 3 way bourne. I instructed patient and son to follow closely with the urologist.

## 2021-10-22 NOTE — ED ADULT NURSE NOTE - CHIEF COMPLAINT QUOTE
Patient BIBA from home, pt states that he has been having trouble with his bourne catheter and states that it has been getting blocked. Pt denies any pain at this time

## 2021-10-22 NOTE — ED PROVIDER NOTE - PATIENT PORTAL LINK FT
You can access the FollowMyHealth Patient Portal offered by [facility] by registering at the following website: [email or web address]. By joining [employer]’s FollowMyHealth portal, you will also be able to view your health information using other applications (apps) compatible with our system.

## 2021-10-22 NOTE — ED PROVIDER NOTE - PSYCHIATRIC, MLM
----- Message from Chacha Haq sent at 5/7/2021  5:44 PM CDT -----  Regarding: ORDER  Please reorder urine for patient. Did not come back to leave specimen. [facility]     Alert and oriented to person, place, time/situation. normal mood and affect. no apparent risk to self or others.

## 2021-10-22 NOTE — ED PROVIDER NOTE - OBJECTIVE STATEMENT
72 yo M w/ PMH of BPH and enlarged bladder BIBA c/o blockage of his bourne catheter x1 day. Pt has had a bourne for past 3 years for his conditions, and get his catheter routinely changed by at home nursing. Pt states that on 10/20/21 his "urethra was ruptured" when the visiting nurse changed his bourne catheter. He states there was blood and pain while inserting, but once in place, the pain and blood resolved. Pt states that the bourne catheter has been draining "on and off" for past day, and when there is no drainage, there is associated suprapubic burning pain. Currently, pt states that he is not in pain and the bourne catheter has drained some urine earlier in the day. Denies fever, dysuria, hematuria, or flank pain.

## 2021-10-22 NOTE — ED PROVIDER NOTE - ATTENDING CONTRIBUTION TO CARE
I, Jaspreet Wyatt, have personally performed a face to face diagnostic evaluation on this patient. I have reviewed the ACP note and agree with the history, exam and plan of care, except as noted.    70 yo M hx of BPH and enlarged bladder, blindness, p/w discomfort in his bourne site. patient report bourne changed 3 days ago and had blood. No blood in the urine bag. new bourne placed without issues. clear urine drainage. UA showed wbc but likely chronic. patient has urology for follow up. I, Jaspreet Wyatt, have personally performed the HPI, physical exam and medical decision making and was personally present and verified all student and ACP documentation or findings including history, physical exam, and medical decision making except as noted.    72 yo M hx of BPH and enlarged bladder, blindness, p/w discomfort in his bourne site. patient report bourne changed 3 days ago and had blood. No blood in the urine bag. new bourne placed without issues. clear urine drainage. UA showed wbc but likely chronic. patient has urology for follow up.

## 2021-10-22 NOTE — ED PROVIDER NOTE - CARE PROVIDER_API CALL
KELSI LAMB  Urology  [address] [facility], [place] [address]  Phone: [phone]  Fax: [phone]  Follow Up Time:

## 2021-10-22 NOTE — ED ADULT NURSE NOTE - OBJECTIVE STATEMENT
Pt BIBA from home, pt states that he has been having trouble with his bourne catheter  being clogged intermittently and states it's causing his pressure in his bladder.  Pt arrived with Bourne catheter and it was changed by ED Luke RODRIGUEZ.  Pt c/o discomfort to his bladder and states his bourne is clogged.  Bourne patent and draining clear yellow urine.  Bladder scan done, Zero urine in bladder at this time.   Dr Edmundo moser.

## 2021-10-22 NOTE — ED PROVIDER NOTE - CLINICAL SUMMARY MEDICAL DECISION MAKING FREE TEXT BOX
Pt came in c/o bourne catheter blockage. Pt came in c/o bourne catheter blockage, will remove and replace catheter.

## 2021-10-22 NOTE — ED PROVIDER NOTE - NSFOLLOWUPINSTRUCTIONS_ED_ALL_ED_FT
Urinary Tract Infection    A urinary tract infection (UTI) is an infection of any part of the urinary tract, which includes the kidneys, ureters, bladder, and urethra. Risk factors include ignoring your need to urinate, wiping back to front if female, being an uncircumcised male, and having diabetes or a weak immune system. Symptoms include frequent urination, pain or burning with urination, foul smelling urine, cloudy urine, pain in the lower abdomen, blood in the urine, and fever. If you were prescribed an antibiotic medicine, take it as told by your health care provider. Do not stop taking the antibiotic even if you start to feel better.    SEEK IMMEDIATE MEDICAL CARE IF YOU HAVE ANY OF THE FOLLOWING SYMPTOMS: severe back or abdominal pain, fever, inability to keep fluids or medicine down, dizziness/lightheadedness, or a change in mental status.      Please take antibiotics as prescribed    Follow up with urologist    Return if symptoms worsen or persist

## 2021-10-22 NOTE — ED PROVIDER NOTE - CCCP TRG CHIEF CMPLNT
I have reviewed discharge instructions with the patient. The patient verbalized understanding. Prescriptions given and reviewed.  Discharged ambulatory without difficulty at this time catheter problem

## 2021-10-26 LAB
-  AMIKACIN: SIGNIFICANT CHANGE UP
-  AMOXICILLIN/CLAVULANIC ACID: SIGNIFICANT CHANGE UP
-  AMPICILLIN/SULBACTAM: SIGNIFICANT CHANGE UP
-  AMPICILLIN: SIGNIFICANT CHANGE UP
-  AZTREONAM: SIGNIFICANT CHANGE UP
-  CEFAZOLIN: SIGNIFICANT CHANGE UP
-  CEFEPIME: SIGNIFICANT CHANGE UP
-  CEFOXITIN: SIGNIFICANT CHANGE UP
-  CEFTRIAXONE: SIGNIFICANT CHANGE UP
-  CIPROFLOXACIN: SIGNIFICANT CHANGE UP
-  ERTAPENEM: SIGNIFICANT CHANGE UP
-  GENTAMICIN: SIGNIFICANT CHANGE UP
-  LEVOFLOXACIN: SIGNIFICANT CHANGE UP
-  MEROPENEM: SIGNIFICANT CHANGE UP
-  NITROFURANTOIN: SIGNIFICANT CHANGE UP
-  PIPERACILLIN/TAZOBACTAM: SIGNIFICANT CHANGE UP
-  TOBRAMYCIN: SIGNIFICANT CHANGE UP
-  TRIMETHOPRIM/SULFAMETHOXAZOLE: SIGNIFICANT CHANGE UP
CULTURE RESULTS: SIGNIFICANT CHANGE UP
METHOD TYPE: SIGNIFICANT CHANGE UP
ORGANISM # SPEC MICROSCOPIC CNT: SIGNIFICANT CHANGE UP
ORGANISM # SPEC MICROSCOPIC CNT: SIGNIFICANT CHANGE UP
SPECIMEN SOURCE: SIGNIFICANT CHANGE UP

## 2021-10-27 RX ORDER — CEPHALEXIN 500 MG
1 CAPSULE ORAL
Qty: 28 | Refills: 0
Start: 2021-10-27 | End: 2021-11-02

## 2021-10-27 NOTE — ED POST DISCHARGE NOTE - DETAILS
Spoke with pt, reports he has taken Keflex before despite allergy (rash to amox), pt has follow up appt this Friday with urologist. Return precautions discussed.

## 2021-11-14 NOTE — DISCHARGE NOTE ADULT - ADDITIONAL INSTRUCTIONS
14-Nov-2021 14:26
-Follow up with Primary Care Doctor within 1 week  -Follow up with Nephrology (kidney doctor) within 1 week  -Follow up with Cardiology (heart doctor) in 1 week  -Follow up with Urology (urinary tract + prostate specialist) within 2-3 weeks  -Follow up with Gastroenterology within 1 week. If your blood count improves to 7 they would like to perform a colonoscopy/endoscopy to try to find a source of your blood loss so that they can treat it. Please visit Dr Flakita Whelan DO. [address] [place] [address] [address], [place] [address]  Phone: [phone]  -Follow up with Hematology Oncology (blood and cancer specialists) in 1 week. Please see them to continue intravenous iron treatments as needed and to follow your blood count closely. Please call Dr. Josie Cerna MD. Address: [address] [place] [address]. Phone: [phone]

## 2022-02-24 NOTE — DIETITIAN INITIAL EVALUATION ADULT. - FEEDING SKILL
.      CERTIFICATE OF RETURN TO WORK    February 24, 2022      Re:   Jorge Wilcox  [address] [place]  [facility] [address]                        This is to certify that Jorge Wilcox has been under my care from 2/24/2022 and is excused from work on December 24th and 25th, due to illness    RESTRICTIONS:  COVID and influenza test pending.  If COVID positive, will require further work restrictions and quarantine.            SIGNATURE:___________________________________________,   2/24/2022      Giuseppe Abreu, [facility]  Urgent [facility] Clinic  [address] [place]  [address] [phone]  Ext. [phone]   independent

## 2022-05-11 ENCOUNTER — EMERGENCY (EMERGENCY)
Facility: HOSPITAL | Age: 72
LOS: 1 days | Discharge: HOME | End: 2022-05-11
Attending: EMERGENCY MEDICINE
Payer: MEDICARE

## 2022-05-11 VITALS
SYSTOLIC BLOOD PRESSURE: 172 MMHG | WEIGHT: 235.89 LBS | HEART RATE: 94 BPM | DIASTOLIC BLOOD PRESSURE: 84 MMHG | RESPIRATION RATE: 18 BRPM | TEMPERATURE: 98 F | HEIGHT: 71 IN | OXYGEN SATURATION: 100 %

## 2022-05-11 VITALS
HEART RATE: 113 BPM | RESPIRATION RATE: 18 BRPM | SYSTOLIC BLOOD PRESSURE: 163 MMHG | TEMPERATURE: 102 F | OXYGEN SATURATION: 97 % | DIASTOLIC BLOOD PRESSURE: 83 MMHG

## 2022-05-11 LAB
FLUAV AG NPH QL: SIGNIFICANT CHANGE UP
FLUBV AG NPH QL: SIGNIFICANT CHANGE UP
RSV RNA NPH QL NAA+NON-PROBE: SIGNIFICANT CHANGE UP
SARS-COV-2 RNA SPEC QL NAA+PROBE: DETECTED

## 2022-05-11 PROCEDURE — 99285 EMERGENCY DEPT VISIT HI MDM: CPT

## 2022-05-11 PROCEDURE — 99283 EMERGENCY DEPT VISIT LOW MDM: CPT | Mod: CS,GC

## 2022-05-11 PROCEDURE — 87637 SARSCOV2&INF A&B&RSV AMP PRB: CPT

## 2022-05-11 RX ORDER — AZITHROMYCIN 500 MG/1
1 TABLET, FILM COATED ORAL
Qty: 8 | Refills: 0
Start: 2022-05-11 | End: 2022-05-14

## 2022-05-11 RX ORDER — ACETAMINOPHEN 500 MG
650 TABLET ORAL ONCE
Refills: 0 | Status: COMPLETED | OUTPATIENT
Start: 2022-05-11 | End: 2022-05-11

## 2022-05-11 RX ORDER — AZITHROMYCIN 500 MG/1
250 TABLET, FILM COATED ORAL ONCE
Refills: 0 | Status: COMPLETED | OUTPATIENT
Start: 2022-05-11 | End: 2022-05-11

## 2022-05-11 RX ADMIN — Medication 650 MILLIGRAM(S): at 21:08

## 2022-05-11 RX ADMIN — AZITHROMYCIN 250 MILLIGRAM(S): 500 TABLET, FILM COATED ORAL at 16:47

## 2022-05-11 RX ADMIN — Medication 650 MILLIGRAM(S): at 16:46

## 2022-05-11 NOTE — ED PROVIDER NOTE - ATTENDING CONTRIBUTION TO CARE
72 y/o M with PMHx BPH, CKD with chronic indwelling Warren, HTN, DM, who presents to the ED c/o sore throat and b/l ear pain.   pe awake alert in nad heent  b/l ear  cerumen throat red no exudate neck supple cor s1s2 lungs clear abd soft nontender neuro nonfocal  dx pharyngitis;  tx abx

## 2022-05-11 NOTE — ED PROVIDER NOTE - NS ED ROS FT
Constitutional: no fever, no chills  Head: NC, AT   Eyes: no redness   ENMT: no nasal congestion/drainage, + sore throat   CV: no chest pain, no edema  Resp: no cough, no dyspnea  GI: no abdominal pain, no nausea, no vomiting, no diarrhea  : no dysuria, no hematuria   Skin: no lesions, no rashes   Neuro: no LOC, no headache, no sensory deficits, no weakness

## 2022-05-11 NOTE — ED PROVIDER NOTE - OBJECTIVE STATEMENT
70 y/o M with PMHx BPH, CKD with chronic indwelling Warren, HTN, DM, who presents to the ED c/o sore throat and b/l ear pain. Patient states that the symptoms started this morning and have worsened since which prompted him to come in. Denies any fevers, chills, congestion, chest pain, syncope, shortness of breath, NVD, or abdominal pain. States that he lives with his daughter at home.

## 2022-05-11 NOTE — ED ADULT NURSE NOTE - OBJECTIVE STATEMENT
pt arrives to ER with complaints of sore throat and ears hurting. Was recently admitted at [facility] for UTI and bladder inflammation was d/c'd from their facility Friday (5/6). Pt is A&Ox4 respirations are even and unlabored, abdomen soft and nontender, no complaints at this time

## 2022-05-11 NOTE — ED PROVIDER NOTE - PHYSICAL EXAMINATION
General: well appearing, NAD  Head: NC, AT  EENT: no scleral icterus, no tonsilar erythema, unable to visualize tympanic membranes due to earwax  Cardiac: RRR, no apparent murmurs, no lower extremity edema  Respiratory: CTABL, no respiratory distress   Abdomen: soft, ND, NT, nonperitonitic  MSK/Vascular: soft compartments, warm extremities  Neuro: AAOx3, sensation to light touch intact  Psych: calm, cooperative

## 2022-05-11 NOTE — ED PROVIDER NOTE - PROGRESS NOTE DETAILS
Patient in no distress. VSS. Will d/c and send script for short course of Azithromycin with PMD f/u and return precautions. - herve

## 2022-09-06 NOTE — PROVIDER CONTACT NOTE (CRITICAL VALUE NOTIFICATION) - PERSON GIVING RESULT:
LAB- GILBERTO DEAN Cartilage Graft Text: Because of the laxity of the alar rim resulting from loss of fibrofatty support, and to preserve form and function of the nose, a cartilage graft was planned.  An incision was made into the conchal bowl and a cutaneous flap was elevated. The conchal bowl cartilage was excised and after hemostasis was obtained, the cutaneous flap was replaced and sutured down.  The cartilage graft was then carved to fit the defect.  Two pockets were made medially and laterally in the alar rim, and the cartilage strut was sutured into place with Vicryl suture.

## 2023-01-01 NOTE — H&P ADULT - RESPIRATORY RATE (BREATHS/MIN)
You can access the FollowMyHealth Patient Portal offered by [facility] by registering at the following website: [email or web address]. By joining [employer]’s FollowMyHealth portal, you will also be able to view your health information using other applications (apps) compatible with our system. You can access the FollowMyHealth Patient Portal offered by [facility] by registering at the following website: [email or web address]. By joining [employer]’s FollowMyHealth portal, you will also be able to view your health information using other applications (apps) compatible with our system. 18

## 2023-04-12 NOTE — ED ADULT NURSE NOTE - PSH
PAST SURGICAL HISTORY:  History of skin surgery s/p excision of cancerous lesion on back under  general anesthesia in 2016    S/P thyroid surgery 2018     No significant past surgical history

## 2023-08-25 NOTE — ED ADULT NURSE NOTE - NSIMPLEMENTINTERV_GEN_ALL_ED
Implemented All Fall with Harm Risk Interventions:  [place] to call system. Call bell, personal items and telephone within reach. Instruct patient to call for assistance. Room bathroom lighting operational. Non-slip footwear when patient is off stretcher. Physically safe environment: no spills, clutter or unnecessary equipment. Stretcher in lowest position, wheels locked, appropriate side rails in place. Provide visual cue, wrist band, yellow gown, etc. Monitor gait and stability. Monitor for mental status changes and reorient to person, place, and time. Review medications for side effects contributing to fall risk. Reinforce activity limits and safety measures with patient and family. Provide visual clues: red socks.
stretcher

## 2023-11-14 NOTE — PROGRESS NOTE ADULT - PROBLEM SELECTOR PLAN 3
None
Seen by urology as patient has chronic foleys, as per urology his urine is contaminated, d/ara antibiotics.
Seen by urology as patient has chronic foleys, as per urology his urine is contaminated, d/ara antibiotics.

## 2024-05-14 NOTE — ED ADULT NURSE NOTE - INTEGUMENTARY WDL
Color consistent with ethnicity/race, warm, dry intact, resilient. [FreeTextEntry1] : Cardiology elevated BP readings w/o Dx of HTN - previously advised low sodium diet; will continue to monitor BP hypertriglyceridemia - continue low cholesterol/low fat and low carbohydrate/low sugar diet - check FLP CAD, 3vessel, non obstructive-continue with Atorvastatin 10 mg qd and ASA 81 mg qd, s/p nuclear stress test  continue to follow up with cardiologist Dr. Katz  Endocrinology hyperglycemia - continue low carbohydrate/low sugar diet  Continue Vitamin D-3 2000 IU p.o.q.d. with a meal as directed  Oncology/Urology prostate cancer - s/p radiation/hormone therapy - continue to follow up with urologist, Dr. Darrin Fortune, check PSA  Gastroenterology GERD - continue anti-reflux measures, Pantoperazole and Famotadine    check CMP, FLP, and free PSA Follow up in 6 months

## 2024-07-12 NOTE — ED PROVIDER NOTE - PATIENT PORTAL LINK FT
negative
You can access the FollowMyHealth Patient Portal offered by [facility] by registering at the following website: [email or web address]. By joining [employer]’s FollowMyHealth portal, you will also be able to view your health information using other applications (apps) compatible with our system.

## 2024-09-12 NOTE — CONSULT NOTE ADULT - PROBLEM SELECTOR PROBLEM 4
Endoscopy Discharge Instructions    _x_ EGD  __Colonoscopy  __Flexible Sigmoidoscopy  __Bronchoscopy  __Gastroscopy  __Esophageal Dilatation   __Endscopic Ultrasound  __Bravo __ ERCP    Diet:   *Regular diet.     For Pain of Discomfort:    * After an EGD, you may experience a slight sore throat which will subside.             You may find throat lozenges or gargles are helpful.     * It is not unusual to feel bloated or full of gas after these procedures. This discomfort will pass.         * Do not take any aspirin or any new medication without asking your doctor.         * If you develop a lump or redness at the site or the IV            you may apply a warm wash cloth to the area for 15-20 minutes, 2-3 times daily.     * Last dose of pain meds were given at :    Activity:    * Avoid driving for 24 hours.    * Rest today, normal activity tomorrow as tolerated     *  May return to work:     Special Instructions:         * If biopsies were taken, call your doctor's office in Seven days for the results.     * Special instructions:      Your Regular Medications:    * Resume all medications unless otherwise instructed        Call your physician for any of the followin.  Persistent bleeding   ** a small amount of bleeding may be noticed if biopsies were taken or polyps removed. Call Doctor  if heavy bleeding-over 2 teaspoons is noted  2.  Difficulty breathing or swallowing  3.  Fever or chills  4.  Increased confusion / severe headache  5.  Persistent nausea, vomiting and/or diarrhea  6.  Drainage from wound with odor  7.  Severe pain, numbness, coldness, or color change in extremity  8. Abdominal pain      16  LICO   
Palliative care encounter

## 2025-03-24 NOTE — ED PROVIDER NOTE - PATIENT PORTAL LINK FT
shortness of breath
You can access the FollowMyHealth Patient Portal offered by [facility] by registering at the following website: [email or web address]. By joining [employer]’s FollowMyHealth portal, you will also be able to view your health information using other applications (apps) compatible with our system.
